# Patient Record
Sex: MALE | Race: BLACK OR AFRICAN AMERICAN | NOT HISPANIC OR LATINO | Employment: PART TIME | URBAN - METROPOLITAN AREA
[De-identification: names, ages, dates, MRNs, and addresses within clinical notes are randomized per-mention and may not be internally consistent; named-entity substitution may affect disease eponyms.]

---

## 2017-01-27 ENCOUNTER — OFFICE VISIT - HEALTHEAST (OUTPATIENT)
Dept: FAMILY MEDICINE | Facility: CLINIC | Age: 52
End: 2017-01-27

## 2017-01-27 DIAGNOSIS — E11.9 TYPE 2 DIABETES MELLITUS (H): ICD-10-CM

## 2017-01-27 DIAGNOSIS — G44.209 MUSCLE TENSION HEADACHE: ICD-10-CM

## 2017-01-27 DIAGNOSIS — Z00.00 HEALTHCARE MAINTENANCE: ICD-10-CM

## 2017-01-27 LAB
ALT SERPL W P-5'-P-CCNC: 16 U/L (ref 0–45)
CHOLEST SERPL-MCNC: 156 MG/DL
FASTING STATUS PATIENT QL REPORTED: ABNORMAL
HBA1C MFR BLD: 7 % (ref 3.5–6)
HDLC SERPL-MCNC: 30 MG/DL

## 2017-01-27 ASSESSMENT — MIFFLIN-ST. JEOR: SCORE: 1473.35

## 2017-01-30 ENCOUNTER — AMBULATORY - HEALTHEAST (OUTPATIENT)
Dept: FAMILY MEDICINE | Facility: CLINIC | Age: 52
End: 2017-01-30

## 2017-01-30 ENCOUNTER — COMMUNICATION - HEALTHEAST (OUTPATIENT)
Dept: FAMILY MEDICINE | Facility: CLINIC | Age: 52
End: 2017-01-30

## 2017-03-02 ENCOUNTER — AMBULATORY - HEALTHEAST (OUTPATIENT)
Dept: FAMILY MEDICINE | Facility: CLINIC | Age: 52
End: 2017-03-02

## 2017-03-02 ENCOUNTER — AMBULATORY - HEALTHEAST (OUTPATIENT)
Dept: LAB | Facility: CLINIC | Age: 52
End: 2017-03-02

## 2017-03-02 DIAGNOSIS — E78.5 HYPERLIPIDEMIA: ICD-10-CM

## 2017-03-02 LAB
ALT SERPL W P-5'-P-CCNC: 32 U/L (ref 0–45)
LDLC SERPL CALC-MCNC: 42 MG/DL

## 2017-03-03 ENCOUNTER — COMMUNICATION - HEALTHEAST (OUTPATIENT)
Dept: FAMILY MEDICINE | Facility: CLINIC | Age: 52
End: 2017-03-03

## 2017-04-04 ENCOUNTER — AMBULATORY - HEALTHEAST (OUTPATIENT)
Dept: CARDIOLOGY | Facility: CLINIC | Age: 52
End: 2017-04-04

## 2017-04-04 DIAGNOSIS — I49.3 PVC (PREMATURE VENTRICULAR CONTRACTION): ICD-10-CM

## 2017-04-06 ENCOUNTER — COMMUNICATION - HEALTHEAST (OUTPATIENT)
Dept: FAMILY MEDICINE | Facility: CLINIC | Age: 52
End: 2017-04-06

## 2017-04-27 ENCOUNTER — RECORDS - HEALTHEAST (OUTPATIENT)
Dept: ADMINISTRATIVE | Facility: OTHER | Age: 52
End: 2017-04-27

## 2017-05-01 ENCOUNTER — RECORDS - HEALTHEAST (OUTPATIENT)
Dept: ADMINISTRATIVE | Facility: OTHER | Age: 52
End: 2017-05-01

## 2017-06-22 ENCOUNTER — AMBULATORY - HEALTHEAST (OUTPATIENT)
Dept: CARDIOLOGY | Facility: CLINIC | Age: 52
End: 2017-06-22

## 2017-06-22 DIAGNOSIS — I49.3 PVC'S (PREMATURE VENTRICULAR CONTRACTIONS): ICD-10-CM

## 2017-08-03 ENCOUNTER — OFFICE VISIT - HEALTHEAST (OUTPATIENT)
Dept: FAMILY MEDICINE | Facility: CLINIC | Age: 52
End: 2017-08-03

## 2017-08-03 DIAGNOSIS — E11.9 TYPE 2 DIABETES MELLITUS (H): ICD-10-CM

## 2017-08-03 DIAGNOSIS — S05.90XA: ICD-10-CM

## 2017-08-03 DIAGNOSIS — R07.89 ATYPICAL CHEST PAIN: ICD-10-CM

## 2017-08-03 LAB — HBA1C MFR BLD: 6.9 % (ref 3.5–6)

## 2017-08-03 ASSESSMENT — MIFFLIN-ST. JEOR: SCORE: 1477.89

## 2017-08-04 ENCOUNTER — COMMUNICATION - HEALTHEAST (OUTPATIENT)
Dept: FAMILY MEDICINE | Facility: CLINIC | Age: 52
End: 2017-08-04

## 2017-08-04 DIAGNOSIS — E11.9 DIABETES MELLITUS (H): ICD-10-CM

## 2017-09-04 ENCOUNTER — COMMUNICATION - HEALTHEAST (OUTPATIENT)
Dept: FAMILY MEDICINE | Facility: CLINIC | Age: 52
End: 2017-09-04

## 2017-09-05 ENCOUNTER — HOSPITAL ENCOUNTER (OUTPATIENT)
Dept: CARDIOLOGY | Facility: CLINIC | Age: 52
Discharge: HOME | End: 2017-09-05
Attending: INTERNAL MEDICINE

## 2017-09-05 DIAGNOSIS — I49.3 PVC'S (PREMATURE VENTRICULAR CONTRACTIONS): ICD-10-CM

## 2017-09-05 LAB
AORTIC ROOT: 2.1 CM
AORTIC VALVE MEAN VELOCITY: 110 CM/S
ASCENDING AORTA: 3.3 CM
AV DIMENSIONLESS INDEX VTI: 0.7
AV MEAN GRADIENT: 5 MMHG
AV PEAK GRADIENT: 10 MMHG
AV VALVE AREA: 2 CM2
AV VELOCITY RATIO: 0.7
BSA FOR ECHO PROCEDURE: 1.86 M2
CV BLOOD PRESSURE: NORMAL MMHG
CV ECHO HEIGHT: 66 IN
CV ECHO WEIGHT: 165 LBS
DOP CALC AO PEAK VEL: 158 CM/S
DOP CALC AO VTI: 36.7 CM
DOP CALC LVOT AREA: 3.14 CM2
DOP CALC LVOT DIAMETER: 2 CM
DOP CALC LVOT PEAK VEL: 114 CM/S
DOP CALC LVOT STROKE VOLUME: 75 CM3
DOP CALCLVOT PEAK VEL VTI: 23.9 CM
EJECTION FRACTION: 61 % (ref 55–75)
FRACTIONAL SHORTENING: 35.1 % (ref 28–44)
INTERVENTRICULAR SEPTUM IN END DIASTOLE: 0.9 CM (ref 0.6–1)
IVS/PW RATIO: 0.9
LA AREA 1: 13.1 CM2
LA AREA 2: 15.2 CM2
LEFT ATRIUM LENGTH: 4.44 CM
LEFT ATRIUM SIZE: 3.7 CM
LEFT ATRIUM VOLUME INDEX: 20.5 ML/M2
LEFT ATRIUM VOLUME: 38.1 CM3
LEFT VENTRICLE DIASTOLIC VOLUME INDEX: 56.5 CM3/M2 (ref 34–74)
LEFT VENTRICLE DIASTOLIC VOLUME: 105 CM3 (ref 62–150)
LEFT VENTRICLE MASS INDEX: 56.2 G/M2
LEFT VENTRICLE SYSTOLIC VOLUME INDEX: 22 CM3/M2 (ref 11–31)
LEFT VENTRICLE SYSTOLIC VOLUME: 41 CM3 (ref 21–61)
LEFT VENTRICULAR INTERNAL DIMENSION IN DIASTOLE: 3.7 CM (ref 4.2–5.8)
LEFT VENTRICULAR INTERNAL DIMENSION IN SYSTOLE: 2.4 CM (ref 2.5–4)
LEFT VENTRICULAR MASS: 104.6 G
LEFT VENTRICULAR OUTFLOW TRACT MEAN GRADIENT: 3 MMHG
LEFT VENTRICULAR OUTFLOW TRACT MEAN VELOCITY: 77.5 CM/S
LEFT VENTRICULAR OUTFLOW TRACT PEAK GRADIENT: 5 MMHG
LEFT VENTRICULAR POSTERIOR WALL IN END DIASTOLE: 1 CM (ref 0.6–1)
LV STROKE VOLUME INDEX: 40.3 ML/M2
MITRAL VALVE E/A RATIO: 1.1
MV AVERAGE E/E' RATIO: 9.7 CM/S
MV DECELERATION TIME: 313 MS
MV E'TISSUE VEL-LAT: 9.75 CM/S
MV E'TISSUE VEL-MED: 6.92 CM/S
MV LATERAL E/E' RATIO: 8.3
MV MEDIAL E/E' RATIO: 11.7
MV PEAK A VELOCITY: 75.5 CM/S
MV PEAK E VELOCITY: 80.9 CM/S
NUC REST DIASTOLIC VOLUME INDEX: 2640 LBS
NUC REST SYSTOLIC VOLUME INDEX: 66 IN
PR MAX PG: 2 MMHG
PR PEAK VELOCITY: 71.2 CM/S
TRICUSPID VALVE ANULAR PLANE SYSTOLIC EXCURSION: 2.4 CM

## 2017-09-05 ASSESSMENT — MIFFLIN-ST. JEOR: SCORE: 1531.19

## 2017-09-13 ENCOUNTER — AMBULATORY - HEALTHEAST (OUTPATIENT)
Dept: LAB | Facility: CLINIC | Age: 52
End: 2017-09-13

## 2017-09-13 DIAGNOSIS — E11.9 TYPE 2 DIABETES MELLITUS (H): ICD-10-CM

## 2017-09-13 LAB
ALT SERPL W P-5'-P-CCNC: 34 U/L (ref 0–45)
CHOLEST SERPL-MCNC: 145 MG/DL
FASTING STATUS PATIENT QL REPORTED: YES
HDLC SERPL-MCNC: 29 MG/DL
LDLC SERPL CALC-MCNC: 87 MG/DL
TRIGL SERPL-MCNC: 144 MG/DL

## 2017-09-14 ENCOUNTER — COMMUNICATION - HEALTHEAST (OUTPATIENT)
Dept: FAMILY MEDICINE | Facility: CLINIC | Age: 52
End: 2017-09-14

## 2017-09-25 ENCOUNTER — COMMUNICATION - HEALTHEAST (OUTPATIENT)
Dept: FAMILY MEDICINE | Facility: CLINIC | Age: 52
End: 2017-09-25

## 2017-10-06 ENCOUNTER — OFFICE VISIT - HEALTHEAST (OUTPATIENT)
Dept: FAMILY MEDICINE | Facility: CLINIC | Age: 52
End: 2017-10-06

## 2017-10-06 DIAGNOSIS — Z01.818 PREOP EXAMINATION: ICD-10-CM

## 2017-10-06 DIAGNOSIS — I49.3 FREQUENT PVCS: ICD-10-CM

## 2017-10-06 ASSESSMENT — MIFFLIN-ST. JEOR: SCORE: 1499.43

## 2017-10-09 LAB
ATRIAL RATE - MUSE: 63 BPM
DIASTOLIC BLOOD PRESSURE - MUSE: NORMAL MMHG
INTERPRETATION ECG - MUSE: NORMAL
P AXIS - MUSE: 67 DEGREES
PR INTERVAL - MUSE: 224 MS
QRS DURATION - MUSE: 100 MS
QT - MUSE: 388 MS
QTC - MUSE: 397 MS
R AXIS - MUSE: 38 DEGREES
SYSTOLIC BLOOD PRESSURE - MUSE: NORMAL MMHG
T AXIS - MUSE: 27 DEGREES
VENTRICULAR RATE- MUSE: 63 BPM

## 2017-10-12 ENCOUNTER — OFFICE VISIT - HEALTHEAST (OUTPATIENT)
Dept: CARDIOLOGY | Facility: CLINIC | Age: 52
End: 2017-10-12

## 2017-10-12 DIAGNOSIS — I49.3 PVC (PREMATURE VENTRICULAR CONTRACTION): ICD-10-CM

## 2017-10-12 ASSESSMENT — MIFFLIN-ST. JEOR: SCORE: 1481.29

## 2017-12-09 ENCOUNTER — COMMUNICATION - HEALTHEAST (OUTPATIENT)
Dept: FAMILY MEDICINE | Facility: CLINIC | Age: 52
End: 2017-12-09

## 2017-12-09 DIAGNOSIS — E11.9 DIABETES MELLITUS (H): ICD-10-CM

## 2018-01-06 ENCOUNTER — COMMUNICATION - HEALTHEAST (OUTPATIENT)
Dept: FAMILY MEDICINE | Facility: CLINIC | Age: 53
End: 2018-01-06

## 2018-01-06 DIAGNOSIS — E11.9 DIABETES MELLITUS (H): ICD-10-CM

## 2018-02-09 ENCOUNTER — COMMUNICATION - HEALTHEAST (OUTPATIENT)
Dept: FAMILY MEDICINE | Facility: CLINIC | Age: 53
End: 2018-02-09

## 2018-04-15 ENCOUNTER — COMMUNICATION - HEALTHEAST (OUTPATIENT)
Dept: FAMILY MEDICINE | Facility: CLINIC | Age: 53
End: 2018-04-15

## 2018-04-15 DIAGNOSIS — E11.9 DIABETES MELLITUS (H): ICD-10-CM

## 2018-04-19 ENCOUNTER — COMMUNICATION - HEALTHEAST (OUTPATIENT)
Dept: FAMILY MEDICINE | Facility: CLINIC | Age: 53
End: 2018-04-19

## 2018-04-19 DIAGNOSIS — E11.9 DIABETES MELLITUS (H): ICD-10-CM

## 2018-06-20 ENCOUNTER — COMMUNICATION - HEALTHEAST (OUTPATIENT)
Dept: FAMILY MEDICINE | Facility: CLINIC | Age: 53
End: 2018-06-20

## 2018-06-20 DIAGNOSIS — Z00.00 HEALTH CARE MAINTENANCE: ICD-10-CM

## 2018-08-09 ENCOUNTER — OFFICE VISIT - HEALTHEAST (OUTPATIENT)
Dept: FAMILY MEDICINE | Facility: CLINIC | Age: 53
End: 2018-08-09

## 2018-08-09 DIAGNOSIS — E11.9 DIABETES MELLITUS (H): ICD-10-CM
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
## 2018-08-09 DIAGNOSIS — E11.9 TYPE 2 DIABETES MELLITUS (H): ICD-10-CM

## 2018-08-09 DIAGNOSIS — R82.90 CLOUDY URINE: ICD-10-CM

## 2018-08-09 DIAGNOSIS — I34.0 MITRAL VALVE REGURGITATION: ICD-10-CM

## 2018-08-09 DIAGNOSIS — M77.12 LATERAL EPICONDYLITIS OF LEFT ELBOW: ICD-10-CM

## 2018-08-09 DIAGNOSIS — M54.50 BILATERAL LOW BACK PAIN WITHOUT SCIATICA: ICD-10-CM

## 2018-08-09 DIAGNOSIS — D69.6 THROMBOCYTOPENIA (H): ICD-10-CM

## 2018-08-09 LAB
ALBUMIN UR-MCNC: NEGATIVE MG/DL
ALT SERPL W P-5'-P-CCNC: 50 U/L (ref 0–45)
ANION GAP SERPL CALCULATED.3IONS-SCNC: 13 MMOL/L (ref 5–18)
APPEARANCE UR: CLEAR
BILIRUB UR QL STRIP: NEGATIVE
BUN SERPL-MCNC: 13 MG/DL (ref 8–22)
CALCIUM SERPL-MCNC: 9.8 MG/DL (ref 8.5–10.5)
CHLORIDE BLD-SCNC: 104 MMOL/L (ref 98–107)
CO2 SERPL-SCNC: 23 MMOL/L (ref 22–31)
COLOR UR AUTO: YELLOW
CREAT SERPL-MCNC: 1.13 MG/DL (ref 0.7–1.3)
CREAT UR-MCNC: 285 MG/DL
ERYTHROCYTE [DISTWIDTH] IN BLOOD BY AUTOMATED COUNT: 12.3 % (ref 11–14.5)
GFR SERPL CREATININE-BSD FRML MDRD: >60 ML/MIN/1.73M2
GLUCOSE BLD-MCNC: 94 MG/DL (ref 70–125)
GLUCOSE UR STRIP-MCNC: NEGATIVE MG/DL
HBA1C MFR BLD: 8.3 % (ref 3.5–6)
HCT VFR BLD AUTO: 42.2 % (ref 40–54)
HGB BLD-MCNC: 14.3 G/DL (ref 14–18)
HGB UR QL STRIP: NEGATIVE
KETONES UR STRIP-MCNC: ABNORMAL MG/DL
LDLC SERPL CALC-MCNC: 109 MG/DL
LEUKOCYTE ESTERASE UR QL STRIP: NEGATIVE
MCH RBC QN AUTO: 27.8 PG (ref 27–34)
MCHC RBC AUTO-ENTMCNC: 33.9 G/DL (ref 32–36)
MCV RBC AUTO: 82 FL (ref 80–100)
MICROALBUMIN UR-MCNC: 0.65 MG/DL (ref 0–1.99)
MICROALBUMIN/CREAT UR: 2.3 MG/G
NITRATE UR QL: NEGATIVE
PH UR STRIP: 6 [PH] (ref 5–8)
PLATELET # BLD AUTO: 112 THOU/UL (ref 140–440)
PMV BLD AUTO: 9.5 FL (ref 7–10)
POTASSIUM BLD-SCNC: 4.3 MMOL/L (ref 3.5–5)
RBC # BLD AUTO: 5.15 MILL/UL (ref 4.4–6.2)
SODIUM SERPL-SCNC: 140 MMOL/L (ref 136–145)
SP GR UR STRIP: 1.02 (ref 1–1.03)
UROBILINOGEN UR STRIP-ACNC: ABNORMAL
WBC: 4.1 THOU/UL (ref 4–11)

## 2018-08-10 ENCOUNTER — AMBULATORY - HEALTHEAST (OUTPATIENT)
Dept: FAMILY MEDICINE | Facility: CLINIC | Age: 53
End: 2018-08-10

## 2018-08-10 ENCOUNTER — COMMUNICATION - HEALTHEAST (OUTPATIENT)
Dept: FAMILY MEDICINE | Facility: CLINIC | Age: 53
End: 2018-08-10

## 2018-08-10 DIAGNOSIS — R74.01 ELEVATED TRANSAMINASE LEVEL: ICD-10-CM

## 2018-08-10 DIAGNOSIS — D69.6 THROMBOCYTOPENIA (H): ICD-10-CM

## 2018-08-10 LAB
ALBUMIN SERPL-MCNC: 4.5 G/DL (ref 3.5–5)
ALP SERPL-CCNC: 69 U/L (ref 45–120)
ALT SERPL W P-5'-P-CCNC: 50 U/L (ref 0–45)
AST SERPL W P-5'-P-CCNC: 48 U/L (ref 0–40)
BILIRUB DIRECT SERPL-MCNC: 0.2 MG/DL
BILIRUB SERPL-MCNC: 0.7 MG/DL (ref 0–1)
HAV IGM SERPL QL IA: NEGATIVE
HBV CORE IGM SERPL QL IA: NEGATIVE
HBV SURFACE AG SERPL QL IA: NEGATIVE
HCV AB SERPL QL IA: NEGATIVE
PROT SERPL-MCNC: 7.8 G/DL (ref 6–8)

## 2018-08-14 ENCOUNTER — COMMUNICATION - HEALTHEAST (OUTPATIENT)
Dept: FAMILY MEDICINE | Facility: CLINIC | Age: 53
End: 2018-08-14

## 2018-08-21 ENCOUNTER — COMMUNICATION - HEALTHEAST (OUTPATIENT)
Dept: FAMILY MEDICINE | Facility: CLINIC | Age: 53
End: 2018-08-21

## 2018-08-21 ENCOUNTER — HOSPITAL ENCOUNTER (OUTPATIENT)
Dept: ULTRASOUND IMAGING | Facility: CLINIC | Age: 53
Discharge: HOME | End: 2018-08-21
Attending: FAMILY MEDICINE

## 2018-08-21 DIAGNOSIS — R74.01 ELEVATED TRANSAMINASE LEVEL: ICD-10-CM

## 2018-08-21 DIAGNOSIS — R74.02 ELEVATION OF LEVEL OF TRANSAMINASE AND LACTIC ACID DEHYDROGENASE (LDH): ICD-10-CM

## 2018-08-21 DIAGNOSIS — D69.6 THROMBOCYTOPENIA (H): ICD-10-CM

## 2018-08-21 DIAGNOSIS — R74.01 ELEVATION OF LEVEL OF TRANSAMINASE AND LACTIC ACID DEHYDROGENASE (LDH): ICD-10-CM

## 2018-10-09 ENCOUNTER — OFFICE VISIT - HEALTHEAST (OUTPATIENT)
Dept: FAMILY MEDICINE | Facility: CLINIC | Age: 53
End: 2018-10-09

## 2018-10-09 ENCOUNTER — RECORDS - HEALTHEAST (OUTPATIENT)
Dept: FAMILY MEDICINE | Facility: CLINIC | Age: 53
End: 2018-10-09

## 2018-10-09 ENCOUNTER — RECORDS - HEALTHEAST (OUTPATIENT)
Dept: ADMINISTRATIVE | Facility: OTHER | Age: 53
End: 2018-10-09

## 2018-10-09 DIAGNOSIS — Z01.818 PREOPERATIVE EXAMINATION: ICD-10-CM

## 2018-10-09 DIAGNOSIS — E11.9 TYPE 2 DIABETES MELLITUS (H): ICD-10-CM

## 2018-10-09 LAB
ERYTHROCYTE [DISTWIDTH] IN BLOOD BY AUTOMATED COUNT: 11.5 % (ref 11–14.5)
HBA1C MFR BLD: 6.8 % (ref 3.5–6)
HCT VFR BLD AUTO: 42.5 % (ref 40–54)
HGB BLD-MCNC: 13.9 G/DL (ref 14–18)
MCH RBC QN AUTO: 27.4 PG (ref 27–34)
MCHC RBC AUTO-ENTMCNC: 32.7 G/DL (ref 32–36)
MCV RBC AUTO: 84 FL (ref 80–100)
PLATELET # BLD AUTO: 126 THOU/UL (ref 140–440)
PMV BLD AUTO: 8.3 FL (ref 7–10)
POTASSIUM BLD-SCNC: 5 MMOL/L (ref 3.5–5)
RBC # BLD AUTO: 5.07 MILL/UL (ref 4.4–6.2)
WBC: 4.7 THOU/UL (ref 4–11)

## 2018-10-10 LAB
ATRIAL RATE - MUSE: 52 BPM
DIASTOLIC BLOOD PRESSURE - MUSE: NORMAL MMHG
INTERPRETATION ECG - MUSE: NORMAL
P AXIS - MUSE: 67 DEGREES
PR INTERVAL - MUSE: 242 MS
QRS DURATION - MUSE: 100 MS
QT - MUSE: 402 MS
QTC - MUSE: 373 MS
R AXIS - MUSE: 44 DEGREES
SYSTOLIC BLOOD PRESSURE - MUSE: NORMAL MMHG
T AXIS - MUSE: 35 DEGREES
VENTRICULAR RATE- MUSE: 52 BPM

## 2018-12-04 ENCOUNTER — OFFICE VISIT - HEALTHEAST (OUTPATIENT)
Dept: CARDIOLOGY | Facility: CLINIC | Age: 53
End: 2018-12-04

## 2018-12-04 DIAGNOSIS — R07.89 OTHER CHEST PAIN: ICD-10-CM

## 2018-12-04 ASSESSMENT — MIFFLIN-ST. JEOR: SCORE: 1490.36

## 2018-12-19 ENCOUNTER — HOSPITAL ENCOUNTER (OUTPATIENT)
Dept: CARDIOLOGY | Facility: CLINIC | Age: 53
Discharge: HOME | End: 2018-12-19
Attending: INTERNAL MEDICINE

## 2018-12-19 ENCOUNTER — HOSPITAL ENCOUNTER (OUTPATIENT)
Dept: NUCLEAR MEDICINE | Facility: CLINIC | Age: 53
Discharge: HOME | End: 2018-12-19
Attending: INTERNAL MEDICINE

## 2018-12-19 DIAGNOSIS — R07.89 OTHER CHEST PAIN: ICD-10-CM

## 2018-12-19 LAB
CV STRESS CURRENT BP HE: NORMAL
CV STRESS CURRENT HR HE: 59
CV STRESS CURRENT HR HE: 59
CV STRESS CURRENT HR HE: 62
CV STRESS CURRENT HR HE: 72
CV STRESS CURRENT HR HE: 72
CV STRESS CURRENT HR HE: 75
CV STRESS CURRENT HR HE: 75
CV STRESS CURRENT HR HE: 76
CV STRESS CURRENT HR HE: 77
CV STRESS CURRENT HR HE: 80
CV STRESS CURRENT HR HE: 82
CV STRESS CURRENT HR HE: 89
CV STRESS CURRENT HR HE: 90
CV STRESS CURRENT HR HE: 93
CV STRESS CURRENT HR HE: 96
CV STRESS CURRENT HR HE: 98
CV STRESS DEVIATION TIME HE: NORMAL
CV STRESS ECHO PERCENT HR HE: NORMAL
CV STRESS EXERCISE STAGE HE: NORMAL
CV STRESS FINAL RESTING BP HE: NORMAL
CV STRESS FINAL RESTING HR HE: 76
CV STRESS MAX HR HE: 100
CV STRESS MAX TREADMILL GRADE HE: 0
CV STRESS MAX TREADMILL SPEED HE: 0
CV STRESS PEAK DIA BP HE: NORMAL
CV STRESS PEAK SYS BP HE: NORMAL
CV STRESS PHASE HE: NORMAL
CV STRESS PROTOCOL HE: NORMAL
CV STRESS RESTING PT POSITION HE: NORMAL
CV STRESS ST DEVIATION AMOUNT HE: NORMAL
CV STRESS ST DEVIATION ELEVATION HE: NORMAL
CV STRESS ST EVELATION AMOUNT HE: NORMAL
CV STRESS TEST TYPE HE: NORMAL
CV STRESS TOTAL STAGE TIME MIN 1 HE: NORMAL
NUC STRESS EJECTION FRACTION: 68 %
STRESS ECHO BASELINE BP: NORMAL
STRESS ECHO BASELINE HR: 54
STRESS ECHO CALCULATED PERCENT HR: 60 %
STRESS ECHO LAST STRESS BP: NORMAL
STRESS ECHO LAST STRESS HR: 89

## 2019-01-22 ENCOUNTER — COMMUNICATION - HEALTHEAST (OUTPATIENT)
Dept: FAMILY MEDICINE | Facility: CLINIC | Age: 54
End: 2019-01-22

## 2019-04-26 ENCOUNTER — COMMUNICATION - HEALTHEAST (OUTPATIENT)
Dept: FAMILY MEDICINE | Facility: CLINIC | Age: 54
End: 2019-04-26

## 2019-04-26 DIAGNOSIS — E11.10 TYPE 2 DIABETES MELLITUS WITH KETOACIDOSIS WITHOUT COMA, UNSPECIFIED WHETHER LONG TERM INSULIN USE (H): ICD-10-CM

## 2019-05-01 ENCOUNTER — OFFICE VISIT - HEALTHEAST (OUTPATIENT)
Dept: FAMILY MEDICINE | Facility: CLINIC | Age: 54
End: 2019-05-01

## 2019-05-01 DIAGNOSIS — E11.9 TYPE 2 DIABETES MELLITUS WITHOUT COMPLICATION, WITHOUT LONG-TERM CURRENT USE OF INSULIN (H): ICD-10-CM

## 2019-05-01 DIAGNOSIS — E11.10 TYPE 2 DIABETES MELLITUS WITH KETOACIDOSIS WITHOUT COMA, UNSPECIFIED WHETHER LONG TERM INSULIN USE (H): ICD-10-CM

## 2019-05-01 DIAGNOSIS — Z01.818 PREOP EXAMINATION: ICD-10-CM

## 2019-05-01 LAB
ATRIAL RATE - MUSE: 49 BPM
DIASTOLIC BLOOD PRESSURE - MUSE: NORMAL MMHG
HBA1C MFR BLD: 7.2 % (ref 3.5–6)
HGB BLD-MCNC: 14.3 G/DL (ref 14–18)
INTERPRETATION ECG - MUSE: NORMAL
P AXIS - MUSE: 55 DEGREES
POTASSIUM BLD-SCNC: 4.3 MMOL/L (ref 3.5–5)
PR INTERVAL - MUSE: 198 MS
QRS DURATION - MUSE: 86 MS
QT - MUSE: 408 MS
QTC - MUSE: 368 MS
R AXIS - MUSE: 37 DEGREES
SYSTOLIC BLOOD PRESSURE - MUSE: NORMAL MMHG
T AXIS - MUSE: 30 DEGREES
VENTRICULAR RATE- MUSE: 49 BPM

## 2019-05-01 ASSESSMENT — MIFFLIN-ST. JEOR: SCORE: 1500.57

## 2019-05-28 ENCOUNTER — COMMUNICATION - HEALTHEAST (OUTPATIENT)
Dept: FAMILY MEDICINE | Facility: CLINIC | Age: 54
End: 2019-05-28

## 2019-05-28 DIAGNOSIS — G44.209 MUSCLE TENSION HEADACHE: ICD-10-CM

## 2019-07-10 ENCOUNTER — RECORDS - HEALTHEAST (OUTPATIENT)
Dept: ADMINISTRATIVE | Facility: OTHER | Age: 54
End: 2019-07-10

## 2019-07-10 LAB — RETINOPATHY: NORMAL

## 2019-07-24 ENCOUNTER — OFFICE VISIT - HEALTHEAST (OUTPATIENT)
Dept: FAMILY MEDICINE | Facility: CLINIC | Age: 54
End: 2019-07-24

## 2019-07-24 DIAGNOSIS — Z01.818 PREOP EXAMINATION: ICD-10-CM

## 2019-07-24 DIAGNOSIS — R23.2 HOT FLASH IN MALE: ICD-10-CM

## 2019-07-24 DIAGNOSIS — H40.32X0 GLAUCOMA OF LEFT EYE SECONDARY TO EYE TRAUMA, UNSPECIFIED GLAUCOMA STAGE: ICD-10-CM

## 2019-07-24 LAB
POTASSIUM BLD-SCNC: 4.2 MMOL/L (ref 3.5–5)
TSH SERPL DL<=0.005 MIU/L-ACNC: 0.72 UIU/ML (ref 0.3–5)

## 2019-07-24 ASSESSMENT — MIFFLIN-ST. JEOR: SCORE: 1479.03

## 2019-08-16 ENCOUNTER — COMMUNICATION - HEALTHEAST (OUTPATIENT)
Dept: FAMILY MEDICINE | Facility: CLINIC | Age: 54
End: 2019-08-16

## 2019-08-16 DIAGNOSIS — G89.29 CHRONIC BILATERAL LOW BACK PAIN WITHOUT SCIATICA: ICD-10-CM

## 2019-08-16 DIAGNOSIS — M54.50 CHRONIC BILATERAL LOW BACK PAIN WITHOUT SCIATICA: ICD-10-CM

## 2019-11-15 ENCOUNTER — COMMUNICATION - HEALTHEAST (OUTPATIENT)
Dept: FAMILY MEDICINE | Facility: CLINIC | Age: 54
End: 2019-11-15

## 2019-11-15 DIAGNOSIS — E11.10 TYPE 2 DIABETES MELLITUS WITH KETOACIDOSIS WITHOUT COMA, UNSPECIFIED WHETHER LONG TERM INSULIN USE (H): ICD-10-CM

## 2019-12-10 ENCOUNTER — OFFICE VISIT - HEALTHEAST (OUTPATIENT)
Dept: CARDIOLOGY | Facility: CLINIC | Age: 54
End: 2019-12-10

## 2019-12-10 DIAGNOSIS — I49.3 PVC'S (PREMATURE VENTRICULAR CONTRACTIONS): ICD-10-CM

## 2019-12-18 ENCOUNTER — HOSPITAL ENCOUNTER (OUTPATIENT)
Dept: CARDIOLOGY | Facility: CLINIC | Age: 54
Discharge: HOME | End: 2019-12-18
Attending: INTERNAL MEDICINE

## 2019-12-18 ENCOUNTER — COMMUNICATION - HEALTHEAST (OUTPATIENT)
Dept: FAMILY MEDICINE | Facility: CLINIC | Age: 54
End: 2019-12-18

## 2019-12-18 DIAGNOSIS — E11.10 TYPE 2 DIABETES MELLITUS WITH KETOACIDOSIS WITHOUT COMA, UNSPECIFIED WHETHER LONG TERM INSULIN USE (H): ICD-10-CM

## 2019-12-18 DIAGNOSIS — I49.3 PVC'S (PREMATURE VENTRICULAR CONTRACTIONS): ICD-10-CM

## 2019-12-31 ENCOUNTER — AMBULATORY - HEALTHEAST (OUTPATIENT)
Dept: CARDIOLOGY | Facility: CLINIC | Age: 54
End: 2019-12-31

## 2019-12-31 DIAGNOSIS — I44.1 WENCKEBACH: ICD-10-CM

## 2019-12-31 DIAGNOSIS — I49.3 FREQUENT PVCS: ICD-10-CM

## 2020-01-15 ENCOUNTER — COMMUNICATION - HEALTHEAST (OUTPATIENT)
Dept: FAMILY MEDICINE | Facility: CLINIC | Age: 55
End: 2020-01-15

## 2020-01-31 ENCOUNTER — OFFICE VISIT - HEALTHEAST (OUTPATIENT)
Dept: SLEEP MEDICINE | Facility: CLINIC | Age: 55
End: 2020-01-31

## 2020-01-31 DIAGNOSIS — E11.9 TYPE 2 DIABETES MELLITUS WITHOUT COMPLICATION, WITHOUT LONG-TERM CURRENT USE OF INSULIN (H): ICD-10-CM

## 2020-01-31 DIAGNOSIS — R06.83 SNORING: ICD-10-CM

## 2020-01-31 DIAGNOSIS — I49.3 FREQUENT PVCS: ICD-10-CM

## 2020-01-31 ASSESSMENT — MIFFLIN-ST. JEOR: SCORE: 1515.32

## 2020-02-04 ENCOUNTER — OFFICE VISIT - HEALTHEAST (OUTPATIENT)
Dept: FAMILY MEDICINE | Facility: CLINIC | Age: 55
End: 2020-02-04

## 2020-02-04 DIAGNOSIS — I49.3 FREQUENT PVCS: ICD-10-CM

## 2020-02-04 DIAGNOSIS — Z01.818 PREOP EXAMINATION: ICD-10-CM

## 2020-02-04 DIAGNOSIS — E11.9 TYPE 2 DIABETES MELLITUS WITHOUT COMPLICATION, WITHOUT LONG-TERM CURRENT USE OF INSULIN (H): ICD-10-CM

## 2020-02-04 DIAGNOSIS — G47.00 INSOMNIA, UNSPECIFIED TYPE: ICD-10-CM

## 2020-02-04 DIAGNOSIS — I34.0 MITRAL VALVE INSUFFICIENCY, UNSPECIFIED ETIOLOGY: ICD-10-CM

## 2020-02-04 DIAGNOSIS — Z00.00 HEALTHCARE MAINTENANCE: ICD-10-CM

## 2020-02-04 LAB
ALT SERPL W P-5'-P-CCNC: 24 U/L (ref 0–45)
ANION GAP SERPL CALCULATED.3IONS-SCNC: 11 MMOL/L (ref 5–18)
ATRIAL RATE - MUSE: 61 BPM
BUN SERPL-MCNC: 9 MG/DL (ref 8–22)
CALCIUM SERPL-MCNC: 9.8 MG/DL (ref 8.5–10.5)
CHLORIDE BLD-SCNC: 105 MMOL/L (ref 98–107)
CO2 SERPL-SCNC: 26 MMOL/L (ref 22–31)
CREAT SERPL-MCNC: 1.05 MG/DL (ref 0.7–1.3)
DIASTOLIC BLOOD PRESSURE - MUSE: NORMAL
GFR SERPL CREATININE-BSD FRML MDRD: >60 ML/MIN/1.73M2
GLUCOSE BLD-MCNC: 99 MG/DL (ref 70–125)
HBA1C MFR BLD: 7.2 % (ref 3.5–6)
HGB BLD-MCNC: 13.9 G/DL (ref 14–18)
HIV 1+2 AB+HIV1 P24 AG SERPL QL IA: NEGATIVE
INTERPRETATION ECG - MUSE: NORMAL
LDLC SERPL CALC-MCNC: 122 MG/DL
P AXIS - MUSE: 64 DEGREES
POTASSIUM BLD-SCNC: 3.7 MMOL/L (ref 3.5–5)
PR INTERVAL - MUSE: 214 MS
QRS DURATION - MUSE: 94 MS
QT - MUSE: 396 MS
QTC - MUSE: 398 MS
R AXIS - MUSE: 36 DEGREES
SODIUM SERPL-SCNC: 142 MMOL/L (ref 136–145)
SYSTOLIC BLOOD PRESSURE - MUSE: NORMAL
T AXIS - MUSE: 28 DEGREES
VENTRICULAR RATE- MUSE: 61 BPM

## 2020-02-04 ASSESSMENT — MIFFLIN-ST. JEOR: SCORE: 1493.71

## 2020-02-05 ENCOUNTER — COMMUNICATION - HEALTHEAST (OUTPATIENT)
Dept: FAMILY MEDICINE | Facility: CLINIC | Age: 55
End: 2020-02-05

## 2020-03-05 ENCOUNTER — COMMUNICATION - HEALTHEAST (OUTPATIENT)
Dept: SCHEDULING | Facility: CLINIC | Age: 55
End: 2020-03-05

## 2020-03-23 ENCOUNTER — COMMUNICATION - HEALTHEAST (OUTPATIENT)
Dept: FAMILY MEDICINE | Facility: CLINIC | Age: 55
End: 2020-03-23

## 2020-03-23 DIAGNOSIS — E11.10 TYPE 2 DIABETES MELLITUS WITH KETOACIDOSIS WITHOUT COMA, UNSPECIFIED WHETHER LONG TERM INSULIN USE (H): ICD-10-CM

## 2020-04-06 ENCOUNTER — AMBULATORY - HEALTHEAST (OUTPATIENT)
Dept: FAMILY MEDICINE | Facility: CLINIC | Age: 55
End: 2020-04-06

## 2020-04-07 ENCOUNTER — RECORDS - HEALTHEAST (OUTPATIENT)
Dept: HEALTH INFORMATION MANAGEMENT | Facility: CLINIC | Age: 55
End: 2020-04-07

## 2020-04-27 ENCOUNTER — COMMUNICATION - HEALTHEAST (OUTPATIENT)
Dept: FAMILY MEDICINE | Facility: CLINIC | Age: 55
End: 2020-04-27

## 2020-05-12 ENCOUNTER — OFFICE VISIT - HEALTHEAST (OUTPATIENT)
Dept: FAMILY MEDICINE | Facility: CLINIC | Age: 55
End: 2020-05-12

## 2020-05-12 DIAGNOSIS — E11.10 TYPE 2 DIABETES MELLITUS WITH KETOACIDOSIS WITHOUT COMA, UNSPECIFIED WHETHER LONG TERM INSULIN USE (H): ICD-10-CM

## 2020-05-12 DIAGNOSIS — R51.9 CHRONIC NONINTRACTABLE HEADACHE, UNSPECIFIED HEADACHE TYPE: ICD-10-CM

## 2020-05-12 DIAGNOSIS — R39.15 URINARY URGENCY: ICD-10-CM

## 2020-05-12 DIAGNOSIS — G89.29 CHRONIC NONINTRACTABLE HEADACHE, UNSPECIFIED HEADACHE TYPE: ICD-10-CM

## 2020-11-10 ENCOUNTER — COMMUNICATION - HEALTHEAST (OUTPATIENT)
Dept: FAMILY MEDICINE | Facility: CLINIC | Age: 55
End: 2020-11-10

## 2020-11-10 DIAGNOSIS — G44.209 MUSCLE TENSION HEADACHE: ICD-10-CM

## 2020-11-14 ENCOUNTER — COMMUNICATION - HEALTHEAST (OUTPATIENT)
Dept: FAMILY MEDICINE | Facility: CLINIC | Age: 55
End: 2020-11-14

## 2020-11-14 ENCOUNTER — COMMUNICATION - HEALTHEAST (OUTPATIENT)
Dept: SCHEDULING | Facility: CLINIC | Age: 55
End: 2020-11-14

## 2020-11-20 ENCOUNTER — OFFICE VISIT - HEALTHEAST (OUTPATIENT)
Dept: FAMILY MEDICINE | Facility: CLINIC | Age: 55
End: 2020-11-20

## 2020-11-20 DIAGNOSIS — E11.10 TYPE 2 DIABETES MELLITUS WITH KETOACIDOSIS WITHOUT COMA, UNSPECIFIED WHETHER LONG TERM INSULIN USE (H): ICD-10-CM

## 2020-11-20 DIAGNOSIS — D64.9 ANEMIA, UNSPECIFIED TYPE: ICD-10-CM

## 2020-11-20 LAB
ALT SERPL W P-5'-P-CCNC: 29 U/L (ref 0–45)
ANION GAP SERPL CALCULATED.3IONS-SCNC: 10 MMOL/L (ref 5–18)
BUN SERPL-MCNC: 13 MG/DL (ref 8–22)
CALCIUM SERPL-MCNC: 9.8 MG/DL (ref 8.5–10.5)
CHLORIDE BLD-SCNC: 103 MMOL/L (ref 98–107)
CO2 SERPL-SCNC: 28 MMOL/L (ref 22–31)
CREAT SERPL-MCNC: 1.25 MG/DL (ref 0.7–1.3)
CREAT UR-MCNC: 93.2 MG/DL
GFR SERPL CREATININE-BSD FRML MDRD: 60 ML/MIN/1.73M2
GLUCOSE BLD-MCNC: 147 MG/DL (ref 70–125)
HBA1C MFR BLD: 8.3 %
HGB BLD-MCNC: 13.2 G/DL (ref 14–18)
LDLC SERPL CALC-MCNC: 87 MG/DL
MICROALBUMIN UR-MCNC: <0.5 MG/DL (ref 0–1.99)
MICROALBUMIN/CREAT UR: NORMAL MG/G{CREAT}
POTASSIUM BLD-SCNC: 4 MMOL/L (ref 3.5–5)
SODIUM SERPL-SCNC: 141 MMOL/L (ref 136–145)

## 2020-11-20 ASSESSMENT — MIFFLIN-ST. JEOR: SCORE: 1530

## 2020-11-24 ENCOUNTER — COMMUNICATION - HEALTHEAST (OUTPATIENT)
Dept: FAMILY MEDICINE | Facility: CLINIC | Age: 55
End: 2020-11-24

## 2021-01-28 ENCOUNTER — OFFICE VISIT - HEALTHEAST (OUTPATIENT)
Dept: FAMILY MEDICINE | Facility: CLINIC | Age: 56
End: 2021-01-28

## 2021-01-28 ENCOUNTER — COMMUNICATION - HEALTHEAST (OUTPATIENT)
Dept: FAMILY MEDICINE | Facility: CLINIC | Age: 56
End: 2021-01-28

## 2021-01-28 DIAGNOSIS — R03.0 ELEVATED BLOOD PRESSURE READING WITHOUT DIAGNOSIS OF HYPERTENSION: ICD-10-CM

## 2021-01-28 DIAGNOSIS — E11.9 TYPE 2 DIABETES MELLITUS WITHOUT COMPLICATION, WITHOUT LONG-TERM CURRENT USE OF INSULIN (H): ICD-10-CM

## 2021-01-28 DIAGNOSIS — R06.83 SNORING: ICD-10-CM

## 2021-01-28 LAB — HBA1C MFR BLD: 7.4 %

## 2021-01-28 ASSESSMENT — MIFFLIN-ST. JEOR: SCORE: 1498.25

## 2021-02-01 ENCOUNTER — TELEPHONE (OUTPATIENT)
Dept: SLEEP MEDICINE | Facility: CLINIC | Age: 56
End: 2021-02-01

## 2021-02-01 NOTE — TELEPHONE ENCOUNTER
Reason for call:  Other   Patient called regarding (reason for call): appointment  Additional comments: Per patient: I'm a current [facility] patient and was seen for my initial evaluation before covid-19(1/31/20) and want to move forward with my sleep study. Would I need to do another eval? If no, can someone call me to schedule the study.    PT MRN for HE is # [id number]        Phone number to reach patient:  Cell number on file:    Telephone Information:   Mobile [phone]       Best Time:  anytime    Can we leave a detailed message on this number?  YES    Travel screening: Not Applicable

## 2021-02-03 DIAGNOSIS — R06.83 SNORING: Primary | ICD-10-CM

## 2021-02-03 NOTE — TELEPHONE ENCOUNTER
Left message for patient to contact sleep center to schedule psg and follow up with Dr. Mariscal. Updated orders are in patients chart.    Iris Fan MA

## 2021-02-04 NOTE — TELEPHONE ENCOUNTER
Returning patients call to schedule psg and follow up, left message for patient to contact sleep center to schedule.    Iris Fan MA

## 2021-02-19 ENCOUNTER — DOCUMENTATION ONLY (OUTPATIENT)
Dept: SLEEP MEDICINE | Facility: CLINIC | Age: 56
End: 2021-02-19

## 2021-02-19 ENCOUNTER — THERAPY VISIT (OUTPATIENT)
Dept: SLEEP MEDICINE | Facility: CLINIC | Age: 56
End: 2021-02-19
Payer: MEDICAID

## 2021-02-19 DIAGNOSIS — R06.83 SNORING: ICD-10-CM

## 2021-02-19 PROCEDURE — 95810 POLYSOM 6/> YRS 4/> PARAM: CPT | Performed by: PEDIATRICS

## 2021-02-23 NOTE — PROCEDURES
" SLEEP STUDY INTERPRETATION  DIAGNOSTIC POLYSOMNOGRAPHY REPORT      Patient: BIJU MOREALND  YOB: 1965  Study Date: 2/19/2021  MRN: [id number]  Referring Provider: -  Ordering Provider: MADELIN Wiley MD    Indications for Polysomnography: The patient is a 55 year old Male who is 5' 5\" and weighs 165.0 lbs. His BMI is 27.5, [place] sleepiness scale 10/24 and neck circumference is 39 cm cm.  Medical history is significant for diabetes mellitus type II, dyslipidemia, legally blind and frequent premature ventricular contractions diagnosed via Holter monitor in 2019. He was told that there was concern for undiagnosed sleep apnea as a result of the Holter monitor testing. A diagnostic polysomnogram was performed to evaluate for sleep apnea.    Polysomnogram Data: A full night polysomnogram recorded the standard physiologic parameters including EEG, EOG, EMG, ECG, nasal and oral airflow. Respiratory parameters of chest and abdominal movements were recorded with respiratory inductance plethysmography. Oxygen saturation was recorded by pulse oximetry. Hypopnea scoring rule used: 1B 4%.    Sleep Architecture: Normal sleep latency and sleep stage distribution.  The patient experienced a prolonged awakening during the study which reduced the sleep efficiency.  The total recording time of the polysomnogram was 525.7 minutes. The total sleep time was 396.0 minutes. Sleep latency was normal at 7.4 minutes without the use of a sleep aid. REM latency was 81.0 minutes. Arousal index was normal at 17.4 arousals per hour. Sleep efficiency was decreased at 75.3%. Wake after sleep onset was 121.5 minutes. The patient spent 8.8% of total sleep time in Stage N1, 55.8% in Stage N2, 15.9% in Stage N3, and 19.4% in REM. Time in REM supine was 52.5 minutes.    Respiration: This study is reassuring for the absence of sleep apnea and sleep-related hypoxemia.  Moderate and intermittent snoring was observed.     Events ? The " polysomnogram revealed a presence of 0 obstructive, 1 central, and 2 mixed apneas resulting in an apnea index of 0.5 events per hour. There were 5 obstructive hypopneas and 0 central hypopneas resulting in an obstructive hypopnea index of 0.8 and central hypopnea index of 0 events per hour. The combined apnea/hypopnea index was 1.2 events per hour (central apnea/hypopnea index was 0.2 events per hour). The REM AHI was 3.9 events per hour. The supine AHI was 0.9 events per hour. The RERA index was 2.9 events per hour.  The RDI was 4.1 events per hour.    Snoring - was reported as moderate/intermittent.    Respiratory rate and pattern - was notable for normal respiratory rate and pattern.    Sustained Sleep Associated Hypoventilation - Transcutaneous carbon dioxide monitoring was not used, however significant hypoventilation was not suggested by oximetry.    Sleep Associated Hypoxemia - (Greater than 5 minutes O2 sat at or below 88%) was not present. Baseline oxygen saturation was 97.2%. Lowest oxygen saturation was 91.0%. Time spent less than or equal to 88% was 0 minutes. Time spent less than or equal to 89% was 0 minutes.    Movement Activity: No significant limb movements or parasomnias.     Periodic Limb Activity - There were 0 PLMs during the entire study. The PLM index was 0 movements per hour. The PLM Arousal Index was 0 per hour.    REM EMG Activity - Excessive muscle activity was not present.    Nocturnal Behavior - Abnormal sleep related behaviors were not noted during sleep.     Bruxism - None apparent.    Cardiac Summary: Normal sinus rhythm.  The average pulse rate was 62.4 bpm. The minimum pulse rate was 47.5 bpm while the maximum pulse rate was 87.2 bpm.  Arrhythmias were not noted.    Assessment:     Primary snoring.    Recommendations:    Patient may be a candidate for dental appliance through referral to Sleep Dentistry for the treatment of socially disruptive snoring, as applicable.    Advice  regarding the risks of drowsy driving.    Suggest optimizing sleep schedule and avoiding sleep deprivation.    Weight management (if BMI > 30).    Diagnostic Codes:   R06.83 Primary snoring    2/19/2021 [facility] Sleep Study (165.0 lbs) - AHI 1.2, RDI 4.1, Supine AHI 0.9, REM AHI 3.9, Low O2 91.0%, Time Spent ?88% 0 minutes / Time Spent ?89% 0 minutes.  _____________________________________   Electronically Signed By:  Dian Wiley MD   2/23/2021

## 2021-03-11 ENCOUNTER — VIRTUAL VISIT (OUTPATIENT)
Dept: SLEEP MEDICINE | Facility: CLINIC | Age: 56
End: 2021-03-11
Payer: MEDICAID

## 2021-03-11 VITALS — BODY MASS INDEX: 27.16 KG/M2 | WEIGHT: 169 LBS | HEIGHT: 66 IN

## 2021-03-11 DIAGNOSIS — R06.83 SNORING: Primary | ICD-10-CM

## 2021-03-11 DIAGNOSIS — E11.00 TYPE 2 DIABETES MELLITUS WITH HYPEROSMOLARITY WITHOUT COMA, WITHOUT LONG-TERM CURRENT USE OF INSULIN (H): ICD-10-CM

## 2021-03-11 DIAGNOSIS — E66.3 OVERWEIGHT (BMI 25.0-29.9): ICD-10-CM

## 2021-03-11 PROCEDURE — 99214 OFFICE O/P EST MOD 30 MIN: CPT | Mod: GT | Performed by: PEDIATRICS

## 2021-03-11 RX ORDER — METFORMIN HCL 500 MG
1000 TABLET, EXTENDED RELEASE 24 HR ORAL
COMMUNITY
Start: 2020-11-20 | End: 2021-12-01

## 2021-03-11 ASSESSMENT — MIFFLIN-ST. JEOR: SCORE: 1553.3

## 2021-03-11 NOTE — PATIENT INSTRUCTIONS
Your BMI is Body mass index is 27.07 kg/m .  Weight management is a personal decision.  If you are interested in exploring weight loss strategies, the following discussion covers the approaches that may be successful. Body mass index (BMI) is one way to tell whether you are at a healthy weight, overweight, or obese. It measures your weight in relation to your height.  A BMI of 18.5 to 24.9 is in the healthy range. A person with a BMI of 25 to 29.9 is considered overweight, and someone with a BMI of 30 or greater is considered obese. More than two-thirds of American adults are considered overweight or obese.  Being overweight or obese increases the risk for further weight gain. Excess weight may lead to heart disease and diabetes.  Creating and following plans for healthy eating and physical activity may help you improve your health.  Weight control is part of healthy lifestyle and includes exercise, emotional health, and healthy eating habits. Careful eating habits lifelong are the mainstay of weight control. Though there are significant health benefits from weight loss, long-term weight loss with diet alone may be very difficult to achieve- studies show long-term success with dietary management in less than 10% of people. Attaining a healthy weight may be especially difficult to achieve in those with severe obesity. In some cases, medications, devices and surgical management might be considered.  What can you do?  If you are overweight or obese and are interested in methods for weight loss, you should discuss this with your provider.     Consider reducing daily calorie intake by 500 calories.     Keep a food journal.     Avoiding skipping meals, consider cutting portions instead.    Diet combined with exercise helps maintain muscle while optimizing fat loss. Strength training is particularly important for building and maintaining muscle mass. Exercise helps reduce stress, increase energy, and improves fitness.  Increasing exercise without diet control, however, may not burn enough calories to loose weight.       Start walking three days a week 10-20 minutes at a time    Work towards walking thirty minutes five days a week     Eventually, increase the speed of your walking for 1-2 minutes at time    In addition, we recommend that you review healthy lifestyles and methods for weight loss available through the National Institutes of Health patient information sites:  http://win.niddk.nih.gov/publications/index.htm    And look into health and wellness programs that may be available through your health insurance provider, employer, local community center, or [employer]aci club.    Weight management plan: Patient was referred to their PCP to discuss a diet and exercise plan.

## 2021-03-11 NOTE — PROGRESS NOTES
Kurtis is a 55 year old who is being evaluated via a billable video visit.      How would you like to obtain your AVS? Mail a copy  If the video visit is dropped, the invitation should be resent by: Send to e-mail at: [email or web address]  Will anyone else be joining your video visit? No   Eugenie Laureano CMA        Video Start Time: 8:30 AM  Video-Visit Details    Type of service:  Video Visit    Video End Time:8:46 AM    Originating Location (pt. Location): Home    Distant Location (provider location):  [facility]     Platform used for Video Visit: [employer]     [facility] [place]  Outpatient Sleep Medicine Encounter, Established Patient      Name: Kurtis Senior MRN# [id number]   Age: 55 year old YOB: 1965     Date of Visit: March 11, 2021  Primary care provider: Iron Reinoso    Assessment and Plan:     1. Snoring  Patient has primary snoring but no evidence of sleep disordered breathing. His snoring is not socially disruptive at this time.      2. Overweight (BMI 25.0-29.9)  Weight loss may improve snoring.    3. Type 2 diabetes mellitus with hyperosmolarity without coma, without long-term current use of insulin (H)  I invited the patient to return to see me if his snoring or breathing during sleep were to become more symptomatic over time.  Retesting would be indicated at that point.  Untreated sleep apnea can interfere with diabetes management; once sleep apnea is treated, HbA1c may improve.      History:   Kurtis Senior is a 55 year old male whose visit today is to review the results of his overnight sleep study performed on February 19, 2021.Medical history is significant for diabetes mellitus type II, dyslipidemia, legally blind and frequent premature ventricular contractions diagnosed via Holter monitor in 2019. He was told that there was concern for undiagnosed sleep apnea as a result of the Holter monitor testing. A diagnostic  "polysomnogram was performed to evaluate for sleep apnea.    The sleep study was reassuring for the absence of sleep apnea and sleep-related hypoxemia.  Moderate and intermittent snoring was observed.  Aside from a prolonged awakening during the test, sleep stages showed normal distribution.  There were no significant limb movements or parasomnias.  No cardiac abnormalities.    Review of recent laboratory studies shows elevated HbA1c at 7.4%, down from 8.3% 3 months ago.  BMI is in the overweight range at 27 kg/m2.      Patient reports some sleep disruption related to his eyes bothering him, particularly when they are dry.  He has undergone corneal transplants but still has significantly reduced vision bilaterally.  He has artificial tears, steroid drops, and other medications for relief.    Medications:     Current Outpatient Medications   Medication Sig     metFORMIN (GLUCOPHAGE-XR) 500 MG 24 hr tablet Take 1,000 mg by mouth     No current facility-administered medications for this visit.         Allergies   Allergen Reactions     Chloroquine Itching       Past Medical History:   No past medical history on file.     Physical Examination:   Ht 1.683 m (5' 6.25\")   Wt 76.7 kg (169 lb)   BMI 27.07 kg/m         Dian Wiley MD   3/11/2021   [facility] [address], [address]  [place] [address] [phone]    Copy to: Iron Reinoso   "

## 2021-03-18 ENCOUNTER — OFFICE VISIT - HEALTHEAST (OUTPATIENT)
Dept: CARDIOLOGY | Facility: CLINIC | Age: 56
End: 2021-03-18

## 2021-03-18 DIAGNOSIS — I34.0 MITRAL REGURGITATION: ICD-10-CM

## 2021-03-18 ASSESSMENT — MIFFLIN-ST. JEOR: SCORE: 1493.65

## 2021-04-07 LAB — SLPCOMP: NORMAL

## 2021-04-15 ENCOUNTER — HOSPITAL ENCOUNTER (OUTPATIENT)
Dept: CARDIOLOGY | Facility: CLINIC | Age: 56
Discharge: HOME | End: 2021-04-15
Attending: INTERNAL MEDICINE

## 2021-04-15 LAB
AORTIC ROOT: 2.8 CM
AORTIC VALVE MEAN VELOCITY: 85.1 CM/S
ASCENDING AORTA: 3 CM
AV DIMENSIONLESS INDEX VTI: 0.8
AV MEAN GRADIENT: 3 MMHG
AV PEAK GRADIENT: 6.7 MMHG
AV VALVE AREA: 1.9 CM2
AV VELOCITY RATIO: 0.7
BSA FOR ECHO PROCEDURE: 1.82 M2
CV BLOOD PRESSURE: ABNORMAL MMHG
CV ECHO HEIGHT: 65.3 IN
CV ECHO WEIGHT: 159 LBS
DOP CALC AO PEAK VEL: 129 CM/S
DOP CALC AO VTI: 22 CM
DOP CALC LVOT AREA: 2.54 CM2
DOP CALC LVOT DIAMETER: 1.8 CM
DOP CALC LVOT PEAK VEL: 91 CM/S
DOP CALC LVOT STROKE VOLUME: 42.7 CM3
DOP CALCLVOT PEAK VEL VTI: 16.8 CM
EJECTION FRACTION: 60 % (ref 55–75)
FRACTIONAL SHORTENING: 33.3 % (ref 28–44)
INTERVENTRICULAR SEPTUM IN END DIASTOLE: 1 CM (ref 0.6–1)
IVS/PW RATIO: 1.1
LA AREA 1: 15.1 CM2
LA AREA 2: 13.4 CM2
LEFT ATRIUM LENGTH: 4.48 CM
LEFT ATRIUM SIZE: 3.1 CM
LEFT ATRIUM TO AORTIC ROOT RATIO: 1.11 NO UNITS
LEFT ATRIUM VOLUME INDEX: 21.1 ML/M2
LEFT ATRIUM VOLUME: 38.4 ML
LEFT VENTRICLE CARDIAC INDEX: 2.3 L/MIN/M2
LEFT VENTRICLE CARDIAC OUTPUT: 4.2 L/MIN
LEFT VENTRICLE DIASTOLIC VOLUME INDEX: 26.4 CM3/M2 (ref 34–74)
LEFT VENTRICLE DIASTOLIC VOLUME: 48 CM3 (ref 62–150)
LEFT VENTRICLE HEART RATE: 98 BPM
LEFT VENTRICLE MASS INDEX: 48.2 G/M2
LEFT VENTRICLE SYSTOLIC VOLUME INDEX: 10.4 CM3/M2 (ref 11–31)
LEFT VENTRICLE SYSTOLIC VOLUME: 19 CM3 (ref 21–61)
LEFT VENTRICULAR INTERNAL DIMENSION IN DIASTOLE: 3.3 CM (ref 4.2–5.8)
LEFT VENTRICULAR INTERNAL DIMENSION IN SYSTOLE: 2.2 CM (ref 2.5–4)
LEFT VENTRICULAR MASS: 87.7 G
LEFT VENTRICULAR OUTFLOW TRACT MEAN GRADIENT: 2 MMHG
LEFT VENTRICULAR OUTFLOW TRACT MEAN VELOCITY: 61.2 CM/S
LEFT VENTRICULAR OUTFLOW TRACT PEAK GRADIENT: 3 MMHG
LEFT VENTRICULAR POSTERIOR WALL IN END DIASTOLE: 0.9 CM (ref 0.6–1)
LV STROKE VOLUME INDEX: 23.5 ML/M2
MITRAL VALVE E/A RATIO: 0.7
MV AVERAGE E/E' RATIO: 9.2 CM/S
MV DECELERATION TIME: 238 MS
MV E'TISSUE VEL-LAT: 8.29 CM/S
MV E'TISSUE VEL-MED: 5.85 CM/S
MV LATERAL E/E' RATIO: 7.8
MV MEDIAL E/E' RATIO: 11.1
MV PEAK A VELOCITY: 93.2 CM/S
MV PEAK E VELOCITY: 64.7 CM/S
NUC REST DIASTOLIC VOLUME INDEX: 2544 LBS
NUC REST SYSTOLIC VOLUME INDEX: 65.25 IN
PV ACCELERATION TIME: 131 MS
TRICUSPID VALVE ANULAR PLANE SYSTOLIC EXCURSION: 1.7 CM

## 2021-04-15 ASSESSMENT — MIFFLIN-ST. JEOR: SCORE: 1492.06

## 2021-04-22 ENCOUNTER — OFFICE VISIT - HEALTHEAST (OUTPATIENT)
Dept: FAMILY MEDICINE | Facility: CLINIC | Age: 56
End: 2021-04-22

## 2021-04-22 DIAGNOSIS — E11.9 TYPE 2 DIABETES MELLITUS WITHOUT COMPLICATION, WITHOUT LONG-TERM CURRENT USE OF INSULIN (H): ICD-10-CM

## 2021-04-22 DIAGNOSIS — N52.9 ERECTILE DYSFUNCTION, UNSPECIFIED ERECTILE DYSFUNCTION TYPE: ICD-10-CM

## 2021-04-22 LAB
ALBUMIN UR-MCNC: NEGATIVE G/DL
ANION GAP SERPL CALCULATED.3IONS-SCNC: 12 MMOL/L (ref 5–18)
APPEARANCE UR: CLEAR
BILIRUB UR QL STRIP: NEGATIVE
BUN SERPL-MCNC: 10 MG/DL (ref 8–22)
CALCIUM SERPL-MCNC: 9.4 MG/DL (ref 8.5–10.5)
CHLORIDE BLD-SCNC: 104 MMOL/L (ref 98–107)
CO2 SERPL-SCNC: 24 MMOL/L (ref 22–31)
COLOR UR AUTO: YELLOW
CREAT SERPL-MCNC: 1.04 MG/DL (ref 0.7–1.3)
CREAT UR-MCNC: 139.8 MG/DL
GFR SERPL CREATININE-BSD FRML MDRD: >60 ML/MIN/1.73M2
GLUCOSE BLD-MCNC: 136 MG/DL (ref 70–125)
GLUCOSE UR STRIP-MCNC: NEGATIVE MG/DL
HBA1C MFR BLD: 7.4 %
HGB UR QL STRIP: NEGATIVE
KETONES UR STRIP-MCNC: ABNORMAL MG/DL
LEUKOCYTE ESTERASE UR QL STRIP: NEGATIVE
MICROALBUMIN UR-MCNC: 0.58 MG/DL (ref 0–1.99)
MICROALBUMIN/CREAT UR: 4.1 MG/G
NITRATE UR QL: NEGATIVE
PH UR STRIP: 5 [PH] (ref 5–8)
POTASSIUM BLD-SCNC: 4.5 MMOL/L (ref 3.5–5)
SODIUM SERPL-SCNC: 140 MMOL/L (ref 136–145)
SP GR UR STRIP: 1.02 (ref 1–1.03)
UROBILINOGEN UR STRIP-ACNC: ABNORMAL

## 2021-04-22 ASSESSMENT — MIFFLIN-ST. JEOR: SCORE: 1498.25

## 2021-04-23 ENCOUNTER — COMMUNICATION - HEALTHEAST (OUTPATIENT)
Dept: FAMILY MEDICINE | Facility: CLINIC | Age: 56
End: 2021-04-23

## 2021-05-24 ENCOUNTER — COMMUNICATION - HEALTHEAST (OUTPATIENT)
Dept: FAMILY MEDICINE | Facility: CLINIC | Age: 56
End: 2021-05-24

## 2021-05-24 DIAGNOSIS — M54.50 CHRONIC BILATERAL LOW BACK PAIN WITHOUT SCIATICA: ICD-10-CM

## 2021-05-24 DIAGNOSIS — G89.29 CHRONIC BILATERAL LOW BACK PAIN WITHOUT SCIATICA: ICD-10-CM

## 2021-05-28 NOTE — TELEPHONE ENCOUNTER
RN cannot approve Refill Request    RN can NOT refill this medication PCP messaged that patient is overdue for Labs and Office Visit. Last office visit: 2018 Iron Reinoso MD Last Physical: 10/9/2018 Last MTM visit: Visit date not found Last visit same specialty: 2018 Iron Reinoso MD.  Next visit within 3 mo: Visit date not found  Next physical within 3 mo: Visit date not found      Iris Martinez, [facility] Triage/Med Refill 2019    Requested Prescriptions   Pending Prescriptions Disp Refills     metFORMIN (GLUCOPHAGE XR) 500 MG 24 hr tablet 120 tablet 6     Si pills twice per day       Metformin Refill Protocol Failed - 2019  4:03 PM        Failed - Visit with PCP or prescribing provider visit in last 6 months or next 3 months     Last office visit with prescriber/PCP: Visit date not found OR same dept: 2018 Iron Reinoso MD OR same specialty: 2018 Iron Reinoso MD Last physical: Visit date not found Last MTM visit: Visit date not found         Next appt within 3 mo: Visit date not found  Next physical within 3 mo: Visit date not found  Prescriber OR PCP: Iron Reinoso MD  Last diagnosis associated with med order: There are no diagnoses linked to this encounter.   If protocol passes may refill for 12 months if within 3 months of last provider visit (or a total of 15 months).           Failed - A1C in last 6 months     Hemoglobin A1c   Date Value Ref Range Status   10/09/2018 6.8 (H) 3.5 - 6.0 % Final               Passed - Blood pressure in last 12 months     BP Readings from Last 1 Encounters:   18 120/70             Passed - LFT or AST or ALT in last 12 months     Albumin   Date Value Ref Range Status   2018 4.5 3.5 - 5.0 g/dL Final     Bilirubin, Total   Date Value Ref Range Status   2018 0.7 0.0 - 1.0 mg/dL Final     Bilirubin, Direct   Date Value Ref Range Status   2018 0.2 <=0.5 mg/dL Final     Alkaline  Phosphatase   Date Value Ref Range Status   08/09/2018 69 45 - 120 U/L Final     AST   Date Value Ref Range Status   08/09/2018 48 (H) 0 - 40 U/L Final     ALT   Date Value Ref Range Status   08/09/2018 50 (H) 0 - 45 U/L Final   08/09/2018 50 (H) 0 - 45 U/L Final     Protein, Total   Date Value Ref Range Status   08/09/2018 7.8 6.0 - 8.0 g/dL Final                Passed - GFR or Serum Creatinine in last 6 months     GFR MDRD Non Af Amer   Date Value Ref Range Status   08/09/2018 >60 >60 mL/min/1.73m2 Final     GFR MDRD Af Amer   Date Value Ref Range Status   08/09/2018 >60 >60 mL/min/1.73m2 Final             Passed - Microalbumin in last year      Microalbumin, Random Urine   Date Value Ref Range Status   08/09/2018 0.65 0.00 - 1.99 mg/dL Final
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
## 2021-05-29 NOTE — TELEPHONE ENCOUNTER
RN cannot approve Refill Request    RN can NOT refill this medication med is not covered by policy/route to provider     . Last office visit: 8/9/2018 Iron Reinoso MD Last Physical: 5/1/2019 Last MTM visit: Visit date not found Last visit same specialty: 8/9/2018 Iron Reinoso MD.  Next visit within 3 mo: Visit date not found  Next physical within 3 mo: Visit date not found      Bonita Stewart, [facility] Triage/Med Refill 5/28/2019    Requested Prescriptions   Pending Prescriptions Disp Refills     ibuprofen (ADVIL,MOTRIN) 600 MG tablet 40 tablet 1     Sig: Take 1 tablet (600 mg total) by mouth every 6 (six) hours as needed for pain.       There is no refill protocol information for this order

## 2021-05-30 VITALS — BODY MASS INDEX: 24.75 KG/M2 | HEIGHT: 66 IN | WEIGHT: 154 LBS
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
## 2021-05-30 NOTE — PATIENT INSTRUCTIONS - HE
.    This is an excerpt from the American Family Physician Medical Journal where a doctor, who thinks we should be doing the psa writes to argue with someone who wrote an editorial saying we should not (2017):     One would have to screen approximately 800 men (with PSA test)to prevent one from dying of prostate cancer.  I do not believe that this supports the authors' conclusion that  prostate cancer screening provides a very small benefit, which is outweighed by significant potential harms of screening and associated follow-up treatment.   This conclusion will no doubt impact the decision making of many practicing physicians, but there is a danger of impersonalization inherent in reaching conclusions based solely on statistical data.  For another perspective, imagine sitting in a crowded football stadium with 100,000 men in the stands who have all been screened for prostate cancer. At halftime, all men whose lives were saved in the past 12 months because of prostate cancer screening are invited to come onto the field. According to the statistics derived from The Lancet study, there are now 125 men (100,000/800) enjoying a football game who would have been dead without screening. How many of the other men in the crowd would conclude that screening is only of little benefit and be inclined to forgo future screening for themselves? I hope family physicians reject practice-changing conclusions based only on  valid data  that ignore the value of individual lives lost because of nifty statistical analysis.  KIMBERLY MCCANN MD  .  Reply : Dr. Mccann focuses entirely on the potential benefits of prostate cancer screening at the population level and disregards the harms. This is inappropriate, because when we are beginning with a population of asymptomatic men, it is particularly important that the process of screening, biopsy, and treatment does not do more harm than good. Unfortunately, the potential harms of screening for  prostate cancer are well established. Based on data those 125 men would be joined on the field by more than 10,000 men who undergo treatment for prostate cancer, of whom about one-third never would have experienced symptoms or illness from the disease. Of those men, approximately 1,700 will experience urinary incontinence, 2,800 will experience long-term erectile dysfunction, and 40 to 50 will die from complications of prostate cancer treatment. These harms may be partially mitigated if more men opt for active surveillance or watchful waiting, although it is unclear to what extent.  Finally, we are not aware of any organization that recommends population screening for prostate cancer in all men in a certain age range. The American Urological Association2 and the American College of Physicians3 recommend that physicians discuss the potential benefits and harms of screening in men 55 to 69 years of age, but they do not recommend screening for all men in that age range. The [employer] Task Force1 and the U.S. Preventive Services Task Force4 recommend against screening for prostate cancer.    -The Author of the Original Article

## 2021-05-31 VITALS — HEIGHT: 66 IN | BODY MASS INDEX: 26.52 KG/M2 | WEIGHT: 165 LBS

## 2021-05-31 VITALS — WEIGHT: 155 LBS | BODY MASS INDEX: 24.91 KG/M2 | HEIGHT: 66 IN

## 2021-05-31 VITALS — WEIGHT: 158 LBS | HEIGHT: 66 IN | BODY MASS INDEX: 25.39 KG/M2

## 2021-05-31 VITALS — BODY MASS INDEX: 24.75 KG/M2 | WEIGHT: 154 LBS | HEIGHT: 66 IN

## 2021-05-31 NOTE — TELEPHONE ENCOUNTER
RN cannot approve Refill Request    RN can NOT refill this medication med is not covered by policy/route to provider     . Last office visit: 8/9/2018 Iron Reinoso MD Last Physical: 7/24/2019 Last MTM visit: Visit date not found Last visit same specialty: 8/9/2018 Iron Reinoso MD.  Next visit within 3 mo: Visit date not found  Next physical within 3 mo: Visit date not found      Bonita Stewart, [facility] Triage/Med Refill 8/16/2019    Requested Prescriptions   Pending Prescriptions Disp Refills     acetaminophen (TYLENOL) 325 MG tablet 50 tablet 3     Sig: Take 2 tablets (650 mg total) by mouth every 4 (four) hours as needed for pain.       There is no refill protocol information for this order

## 2021-06-01 VITALS — WEIGHT: 156.5 LBS | BODY MASS INDEX: 25.26 KG/M2

## 2021-06-01 VITALS — BODY MASS INDEX: 25.58 KG/M2 | WEIGHT: 158.5 LBS

## 2021-06-02 VITALS — WEIGHT: 156 LBS | BODY MASS INDEX: 25.07 KG/M2 | HEIGHT: 66 IN

## 2021-06-02 VITALS — HEIGHT: 66 IN | BODY MASS INDEX: 25.71 KG/M2 | WEIGHT: 160 LBS

## 2021-06-03 VITALS
RESPIRATION RATE: 14 BRPM | SYSTOLIC BLOOD PRESSURE: 116 MMHG | OXYGEN SATURATION: 97 % | HEART RATE: 75 BPM | DIASTOLIC BLOOD PRESSURE: 74 MMHG

## 2021-06-03 VITALS — BODY MASS INDEX: 26.16 KG/M2 | WEIGHT: 157 LBS | HEIGHT: 65 IN

## 2021-06-03 NOTE — TELEPHONE ENCOUNTER
RN cannot approve Refill Request    RN can NOT refill this medication Protocol failed and NO refill given. Last office visit: 8/9/2018 Iron Reinoso MD Last Physical: 7/24/2019 Last MTM visit: Visit date not found Last visit same specialty: 8/9/2018 Iron Reinoso MD.  Next visit within 3 mo: Visit date not found  Next physical within 3 mo: Visit date not found      Lluvia Leos, [facility] Triage/Med Refill 11/16/2019    Requested Prescriptions   Pending Prescriptions Disp Refills     metFORMIN (GLUCOPHAGE-XR) 500 MG 24 hr tablet [Pharmacy Med Name: metFORMIN HCl ER Oral Tablet Extended Release 24 Hour 500 MG] 120 tablet 5     Sig: TAKE TWO TABLETS BY MOUTH TWICE DAILY       Metformin Refill Protocol Failed - 11/15/2019 12:38 PM        Failed - LFT or AST or ALT in last 12 months     Albumin   Date Value Ref Range Status   08/09/2018 4.5 3.5 - 5.0 g/dL Final     Bilirubin, Total   Date Value Ref Range Status   08/09/2018 0.7 0.0 - 1.0 mg/dL Final     Bilirubin, Direct   Date Value Ref Range Status   08/09/2018 0.2 <=0.5 mg/dL Final     Alkaline Phosphatase   Date Value Ref Range Status   08/09/2018 69 45 - 120 U/L Final     AST   Date Value Ref Range Status   08/09/2018 48 (H) 0 - 40 U/L Final     ALT   Date Value Ref Range Status   08/09/2018 50 (H) 0 - 45 U/L Final   08/09/2018 50 (H) 0 - 45 U/L Final     Protein, Total   Date Value Ref Range Status   08/09/2018 7.8 6.0 - 8.0 g/dL Final                Failed - GFR or Serum Creatinine in last 6 months     GFR MDRD Non Af Amer   Date Value Ref Range Status   08/09/2018 >60 >60 mL/min/1.73m2 Final     GFR MDRD Af Amer   Date Value Ref Range Status   08/09/2018 >60 >60 mL/min/1.73m2 Final             Failed - A1C in last 6 months     Hemoglobin A1c   Date Value Ref Range Status   05/01/2019 7.2 (H) 3.5 - 6.0 % Final               Failed - Microalbumin in last year      Microalbumin, Random Urine   Date Value Ref Range Status   08/09/2018 0.65  0.00 - 1.99 mg/dL Final                  Passed - Blood pressure in last 12 months     BP Readings from Last 1 Encounters:   07/24/19 116/74             Passed - Visit with PCP or prescribing provider visit in last 6 months or next 3 months     Last office visit with prescriber/PCP: Visit date not found OR same dept: Visit date not found OR same specialty: 8/9/2018 Iron Reinoso MD Last physical: 7/24/2019 Last MTM visit: Visit date not found         Next appt within 3 mo: Visit date not found  Next physical within 3 mo: Visit date not found  Prescriber OR PCP: Iron Reinoso MD  Last diagnosis associated with med order: 1. Type 2 diabetes mellitus with ketoacidosis without coma, unspecified whether long term insulin use (H)  - metFORMIN (GLUCOPHAGE-XR) 500 MG 24 hr tablet [Pharmacy Med Name: metFORMIN HCl ER Oral Tablet Extended Release 24 Hour 500 MG]; TAKE TWO TABLETS BY MOUTH TWICE DAILY   Dispense: 120 tablet; Refill: 5     If protocol passes may refill for 12 months if within 3 months of last provider visit (or a total of 15 months).

## 2021-06-04 VITALS
HEIGHT: 65 IN | DIASTOLIC BLOOD PRESSURE: 78 MMHG | HEART RATE: 71 BPM | BODY MASS INDEX: 27.49 KG/M2 | WEIGHT: 165 LBS | SYSTOLIC BLOOD PRESSURE: 122 MMHG | OXYGEN SATURATION: 97 %

## 2021-06-04 VITALS
TEMPERATURE: 98.3 F | HEIGHT: 65 IN | HEART RATE: 76 BPM | OXYGEN SATURATION: 98 % | WEIGHT: 159 LBS | RESPIRATION RATE: 16 BRPM | BODY MASS INDEX: 26.49 KG/M2 | SYSTOLIC BLOOD PRESSURE: 132 MMHG | DIASTOLIC BLOOD PRESSURE: 86 MMHG

## 2021-06-04 NOTE — TELEPHONE ENCOUNTER
Refill provided for 2 months.  Please help patient schedule follow-up diabetes appointment w patient

## 2021-06-04 NOTE — TELEPHONE ENCOUNTER
RN cannot approve Refill Request    RN can NOT refill this medication PCP messaged that patient is overdue for Labs. Last office visit: 8/9/2018 Iron Reinoso MD Last Physical: 7/24/2019 Last MTM visit: Visit date not found Last visit same specialty: 8/9/2018 Iron Reinoso MD.  Next visit within 3 mo: Visit date not found  Next physical within 3 mo: Visit date not found      David Gomes, [facility] Triage/Med Refill 12/20/2019    Requested Prescriptions   Pending Prescriptions Disp Refills     metFORMIN (GLUCOPHAGE-XR) 500 MG 24 hr tablet [Pharmacy Med Name: metFORMIN HCl ER Oral Tablet Extended Release 24 Hour 500 MG] 120 tablet 5     Sig: TAKE TWO TABLETS BY MOUTH TWICE DAILY       Metformin Refill Protocol Failed - 12/18/2019 11:09 AM        Failed - LFT or AST or ALT in last 12 months     Albumin   Date Value Ref Range Status   08/09/2018 4.5 3.5 - 5.0 g/dL Final     Bilirubin, Total   Date Value Ref Range Status   08/09/2018 0.7 0.0 - 1.0 mg/dL Final     Bilirubin, Direct   Date Value Ref Range Status   08/09/2018 0.2 <=0.5 mg/dL Final     Alkaline Phosphatase   Date Value Ref Range Status   08/09/2018 69 45 - 120 U/L Final     AST   Date Value Ref Range Status   08/09/2018 48 (H) 0 - 40 U/L Final     ALT   Date Value Ref Range Status   08/09/2018 50 (H) 0 - 45 U/L Final   08/09/2018 50 (H) 0 - 45 U/L Final     Protein, Total   Date Value Ref Range Status   08/09/2018 7.8 6.0 - 8.0 g/dL Final                Failed - GFR or Serum Creatinine in last 6 months     GFR MDRD Non Af Amer   Date Value Ref Range Status   08/09/2018 >60 >60 mL/min/1.73m2 Final     GFR MDRD Af Amer   Date Value Ref Range Status   08/09/2018 >60 >60 mL/min/1.73m2 Final             Failed - A1C in last 6 months     Hemoglobin A1c   Date Value Ref Range Status   05/01/2019 7.2 (H) 3.5 - 6.0 % Final               Failed - Microalbumin in last year      Microalbumin, Random Urine   Date Value Ref Range Status    08/09/2018 0.65 0.00 - 1.99 mg/dL Final                  Passed - Blood pressure in last 12 months     BP Readings from Last 1 Encounters:   12/10/19 116/74             Passed - Visit with PCP or prescribing provider visit in last 6 months or next 3 months     Last office visit with prescriber/PCP: Visit date not found OR same dept: Visit date not found OR same specialty: 8/9/2018 Iron Reinoso MD Last physical: 7/24/2019 Last MTM visit: Visit date not found         Next appt within 3 mo: Visit date not found  Next physical within 3 mo: Visit date not found  Prescriber OR PCP: Iron Reinoso MD  Last diagnosis associated with med order: 1. Type 2 diabetes mellitus with ketoacidosis without coma, unspecified whether long term insulin use (H)  - metFORMIN (GLUCOPHAGE-XR) 500 MG 24 hr tablet [Pharmacy Med Name: metFORMIN HCl ER Oral Tablet Extended Release 24 Hour 500 MG]; TAKE TWO TABLETS BY MOUTH TWICE DAILY  Dispense: 120 tablet; Refill: 5     If protocol passes may refill for 12 months if within 3 months of last provider visit (or a total of 15 months).

## 2021-06-05 VITALS
RESPIRATION RATE: 19 BRPM | WEIGHT: 160 LBS | DIASTOLIC BLOOD PRESSURE: 88 MMHG | HEIGHT: 65 IN | BODY MASS INDEX: 26.66 KG/M2 | TEMPERATURE: 96.9 F | SYSTOLIC BLOOD PRESSURE: 155 MMHG | HEART RATE: 69 BPM

## 2021-06-05 VITALS
TEMPERATURE: 96.8 F | DIASTOLIC BLOOD PRESSURE: 72 MMHG | WEIGHT: 167 LBS | SYSTOLIC BLOOD PRESSURE: 128 MMHG | BODY MASS INDEX: 27.82 KG/M2 | RESPIRATION RATE: 17 BRPM | HEIGHT: 65 IN | HEART RATE: 65 BPM

## 2021-06-05 VITALS
WEIGHT: 159 LBS | DIASTOLIC BLOOD PRESSURE: 76 MMHG | BODY MASS INDEX: 26.49 KG/M2 | HEIGHT: 65 IN | RESPIRATION RATE: 14 BRPM | HEART RATE: 68 BPM | SYSTOLIC BLOOD PRESSURE: 104 MMHG

## 2021-06-05 VITALS
DIASTOLIC BLOOD PRESSURE: 76 MMHG | OXYGEN SATURATION: 100 % | BODY MASS INDEX: 26.66 KG/M2 | HEIGHT: 65 IN | HEART RATE: 71 BPM | SYSTOLIC BLOOD PRESSURE: 127 MMHG | WEIGHT: 160 LBS | TEMPERATURE: 96.8 F

## 2021-06-05 VITALS — HEIGHT: 65 IN | WEIGHT: 159 LBS | BODY MASS INDEX: 26.49 KG/M2
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
## 2021-06-06 NOTE — TELEPHONE ENCOUNTER
Pt called in states he want to speak with PCP about his Blood sugar medication.     Please advise      Santiago Randall RN, [facility] Triage/Med Refill 3/5/2020 8:18 PM

## 2021-06-06 NOTE — TELEPHONE ENCOUNTER
Called and spoke with patient he stated he is out of his metformin. I instructed patient to call the pharmacy, a 3 months supply was sent for him in 12/2019. Patient has question he wants to know if the metformin concentration is enough for his blood, if it is can he stop or can he continue. I clarify 3 times with patient and unsure what he meant by that question. But stated just ask Dr. Reinoso the question.    I also mention Dr. Reinoso would like for him to come back in 5/2020 for a follow up and patient declined. He stated he was told to come back in 6 months. When do you want patient to return?     Please advise.

## 2021-06-06 NOTE — TELEPHONE ENCOUNTER
I have not found time to call hime yet- please contact him and let him know that I will do that.  I would be helpful to me to know what his concern is before we talk- thanks

## 2021-06-07 NOTE — PROGRESS NOTES
Chart reviewed by Ambulatory Quality and Data team    Please abstract the following data from this visit with this patient into the appropriate field in Epic:    Tests that can be patient reported without a hard copy:        Other Tests found in the patient's chart through Chart Review/Care Everywhere:    Eye exam with ophthalmology on this date: 7/10/2019.     Note to Abstraction: If this section is blank, no results were found via Chart Review/Care Everywhere.

## 2021-06-07 NOTE — TELEPHONE ENCOUNTER
Medication refill requested. Please authorize medication if appropriate.   Last seen 2/4/20  Last refill 12/20/19

## 2021-06-07 NOTE — TELEPHONE ENCOUNTER
New Appointment Needed  What is the reason for the visit:    office visit, 3 mo follow up diabetes, A1C  Provider Preference: PCP only  How soon do you need to be seen?: In May  Waitlist offered?: No  Okay to leave a detailed message:  Yes

## 2021-06-08 NOTE — PROGRESS NOTES
Over 25 minutes spent, greater than 50% was counseling regarding following issues:  1. Type 2 diabetes mellitus  Patient with genetic variant type 2 diabetes which is to cause diabetes that does not progress.  Diet-controlled only.  A1c 7.0.  Reassured.  No change.  Follow-up 6 months await other labs.  - Glycosylated Hemoglobin A1c  - Microalbumin, Random Urine  - Basic Metabolic Panel  - ALT (SGPT)  - HDL Cholesterol  - Cholesterol, Total    2. Healthcare maintenance  Extensive discussion regarding healthcare maintenance options.  Discussed pros and cons of PSA, patient for now declined  Accepting of colonoscopy.  - Ambulatory referral for Colonoscopy  - HDL Cholesterol  - Cholesterol, Total  Exam done today which included review vital signs, her iliac, respiratory, extremities with monofilament testing was normal.  Results for orders placed or performed in visit on 01/27/17   Glycosylated Hemoglobin A1c   Result Value Ref Range    Hemoglobin A1c 7.0 (H) 3.5 - 6.0 %

## 2021-06-08 NOTE — PROGRESS NOTES
"Kurtis Senior is a 54 y.o. male who is being evaluated via a billable telephone visit.      The patient has been notified of following:     \"This telephone visit will be conducted via a call between you and your physician/provider. We have found that certain health care needs can be provided without the need for a physical exam.  This service lets us provide the care you need with a short phone conversation.  If a prescription is necessary we can send it directly to your pharmacy.  If lab work is needed we can place an order for that and you can then stop by our lab to have the test done at a later time.    Telephone visits are billed at different rates depending on your insurance coverage. During this emergency period, for some insurers they may be billed the same as an in-person visit.  Please reach out to your insurance provider with any questions.    If during the course of the call the physician/provider feels a telephone visit is not appropriate, you will not be charged for this service.\"    Patient has given verbal consent to a Telephone visit? Yes    What phone number would you like to be contacted at? [phone]    Patient would like to receive their AVS by AVS Preference: Mail a copy.    Discussed problems below + janie rec  Assessment/Plan:    Problem List Items Addressed This Visit        High    Type 2 diabetes mellitus (H)     Met 1000 two times a day  a1c feb- 7.2  No changes         Relevant Medications    metFORMIN (GLUCOPHAGE-XR) 500 MG 24 hr tablet       Unprioritized    Chronic nonintractable headache, unspecified headache type     Posterior  Felt to be occip neuralgia  Injection was not effective before    Discussed neuro referral post pandemic  Perhaps related to eye- corneal transplant             Urinary urgency - Primary     Only when home- better when out and about  No change in intake of bladder irritants  No burning  No obstructive sx    Bladder retraining exercises mailed to " patient- discussed  Hold off on anticholinergic for now  ua next visit               Phone call duration:  21 minutes    Iron Reinoso MD
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
## 2021-06-13 NOTE — TELEPHONE ENCOUNTER
On 11/14/2020 he was told to go to the emergency room.  Did he actually do this?  It does not look like he has    I think this patient needs a virtual visit to evaluate his clinical condition and decide what needs to be done.  Please set this up

## 2021-06-13 NOTE — TELEPHONE ENCOUNTER
"Pt is calling in about being exposed to someone last Monday who tested positive for Covid, and this person got the results Thursday that he was positive. Pt started having chest pain and shortness of breath on Thursday, and is still having this now. Pt reports it is continuous, and he feels like something is blocking his airway. Pt feels chest pressure, sharp pain, rating it \"6\". Pt feels worse when laying down. Pt reports right arm is also painful, and he is unable to carry anything over 5 pounds. Pt also reports blisters on his mouth, and fever, but no cough now.   Care advice given, and per protocol pt should go to the ER, and if he has no one to take him there now, he needs to call 911. Pt verbalized understanding, and will call 911 now.     David Gomes RN [facility] Triage/Medication Refill     Reason for Disposition    Pain also present in shoulder(s) or arm(s) or jaw  (Exception: pain is clearly made worse by movement)    Difficulty breathing    SEVERE chest pain    Additional Information    Negative: Severe difficulty breathing (e.g., struggling for each breath, speaks in single words)    Negative: Difficult to awaken or acting confused (e.g., disoriented, slurred speech)    Negative: Shock suspected (e.g., cold/pale/clammy skin, too weak to stand, low BP, rapid pulse)    Negative: [1] Chest pain lasts > 5 minutes AND [2] history of heart disease  (i.e., heart attack, bypass surgery, angina, angioplasty, CHF; not just a heart murmur)    Negative: [1] Chest pain lasts > 5 minutes AND [2] described as crushing, pressure-like, or heavy    Negative: [1] Chest pain lasts > 5 minutes AND [2] age > 50    Negative: [1] Chest pain lasts > 5 minutes AND [2] age > 30 AND [3] at least one cardiac risk factor (i.e., hypertension, diabetes, obesity, smoker or strong family history of heart disease)    Negative: [1] Chest pain lasts > 5 minutes AND [2] not relieved with nitroglycerin    Negative: Passed out (i.e., " "lost consciousness, collapsed and was not responding)    Negative: Heart beating < 50 beats per minute OR > 140 beats per minute    Negative: Visible sweat on face or sweat dripping down face    Negative: Sounds like a life-threatening emergency to the triager    Negative: Followed a chest injury    Negative: [1] Intermittent  chest pain or \"angina\" AND [2] increasing in severity or frequency  (Exception: pains lasting a few seconds)    Protocols used: CHEST PAIN-A-AH      "

## 2021-06-13 NOTE — TELEPHONE ENCOUNTER
Called and spoke with patient, he stated he has a test schedule with [facility] pharmacy on Wednesday. He cancel his ofv for Thursday. He will be calling us back for an appointment when his test results come back.    He did not go to the ER because he was afraid to catch covid from the ER.
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
## 2021-06-13 NOTE — TELEPHONE ENCOUNTER
Who is calling:  Pt    Reason for Call:  Pt want his dr to order a covid test. He has fever and short of breath.    Date of last appointment with primary care:  Okay to leave a detailed message: Yes

## 2021-06-14 NOTE — TELEPHONE ENCOUNTER
Called pt no answer lmcb to [facility] [phone] we dont have a [facility] in [place] he would need to go to [place]
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
## 2021-06-14 NOTE — TELEPHONE ENCOUNTER
----- Message from Iron Reinoso MD sent at 1/28/2021  1:05 PM CST -----  Please contact [facility], patient saw a Dr. Wiley does not appear to be in our list of physicians currently.    He was referred for a sleep study at [place], by his account  He cannot get transportation to [place] and never did the sleep study    I am wondering if someone can contact him and try to arrange a sleep study that he can get to.  Think that has to come from the sleep medicine clinic.
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
## 2021-06-17 NOTE — TELEPHONE ENCOUNTER
RN cannot approve Refill Request    RN can NOT refill this medication Protocol failed and NO refill given. Last office visit: 4/22/2021 Iron Reinoso MD Last Physical: 2/4/2020 Last MTM visit: Visit date not found Last visit same specialty: 4/22/2021 Iron Reinoso MD.  Next visit within 3 mo: Visit date not found  Next physical within 3 mo: Visit date not found      Lucy Wei, [facility] Triage/Med Refill 5/24/2021    Requested Prescriptions   Pending Prescriptions Disp Refills     PAIN RELIEVER, ACETAMINOPHEN, 325 mg tablet [Pharmacy Med Name: SM Pain Reliever Oral Tablet 325 MG] 50 tablet 0     Sig: Take 2 tablets (650 mg total) by mouth every 4 (four) hours as needed for pain       There is no refill protocol information for this order

## 2021-06-17 NOTE — TELEPHONE ENCOUNTER
Telephone Encounter by Humberto Spicer at 4/30/2021  2:17 PM     Author: Humberto Spicer Service: -- Author Type: Patient Access    Filed: 4/30/2021  2:20 PM Encounter Date: 4/23/2021 Status: Signed    : Humberto Spicer (Patient Access)       PRIOR AUTHORIZATION DENIED    Denial Rational: Revatio is approved for a diagnosis of pulmonary arterial hypertension (PAH), WHO Group 1. Approval is not granted for any patient on Adcirca (tadafil).          Appeal Information: If the provider would like to appeal this denial, please provide a letter of medical necessity and once it has been completed and placed in the patient's chart, notify the Central PA Team ([facility] MED [id number]) and the appeal can be initiated on behalf of the patient and provider.  Please also include any therapies that the patient has tried and their outcomes.

## 2021-06-17 NOTE — TELEPHONE ENCOUNTER
Central PA team  [phone]  Pool: [facility] PA MED ([id number])          PA has been initiated.       PA form completed and faxed insurance via Cover My Meds     Key:  [id number]     Medication:  sildenafil (REVATIO) 20 mg tablet    Insurance:  [place] MEDICAID        Response will be received via fax and may take up to 5-10 business days depending on plan
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
## 2021-06-20 NOTE — LETTER
Letter by Iron Reinoso MD at      Author: Iron Reinoso MD Service: -- Author Type: --    Filed:  Encounter Date: 2/5/2020 Status: (Other)         Kurtis Senior  [address] [facility] [address]             February 5, 2020         Dear Mr. Senior,    Below are the results from your recent visit:    Resulted Orders   HIV Antigen/Antibody Screening Cascade   Result Value Ref Range    HIV Antigen / Antibody Negative Negative    Narrative    Method is Abbott HIV Ag/Ab for the detection of HIV p24 antigen, HIV-1 antibodies and HIV-2 antibodies.   Basic Metabolic Panel   Result Value Ref Range    Sodium 142 136 - 145 mmol/L    Potassium 3.7 3.5 - 5.0 mmol/L    Chloride 105 98 - 107 mmol/L    CO2 26 22 - 31 mmol/L    Anion Gap, Calculation 11 5 - 18 mmol/L    Glucose 99 70 - 125 mg/dL    Calcium 9.8 8.5 - 10.5 mg/dL    BUN 9 8 - 22 mg/dL    Creatinine 1.05 0.70 - 1.30 mg/dL    GFR MDRD Af Amer >60 >60 mL/min/1.73m2    GFR MDRD Non Af Amer >60 >60 mL/min/1.73m2    Narrative    Fasting Glucose reference range is 70-99 mg/dL per  American Diabetes Association (ADA) guidelines.   Glycosylated Hemoglobin A1c   Result Value Ref Range    Hemoglobin A1c 7.2 (H) 3.5 - 6.0 %   Custom LDL Cholesterol, Direct   Result Value Ref Range    Direct  <=129 mg/dl   ALT (SGPT)   Result Value Ref Range    ALT 24 0 - 45 U/L   Hemoglobin   Result Value Ref Range    Hemoglobin 13.9 (L) 14.0 - 18.0 g/dL       Kurtis, the results of your recent blood test are normal.         Please call with questions or contact us using [employer].    Sincerely,        Electronically signed by Iron Reinoso MD

## 2021-06-21 NOTE — LETTER
"Letter by Iron Reinoso MD at      Author: Iron Reinoso MD Service: -- Author Type: --    Filed:  Encounter Date: 11/24/2020 Status: (Other)         Kurtis Senior  [address] [place] [place] [address]             November 24, 2020         Dear Mr. Senior,    Below are the results from your recent visit:    Resulted Orders   Microalbumin, Random Urine   Result Value Ref Range    Microalbumin, Random Urine <0.50 0.00 - 1.99 mg/dL    Creatinine, Urine 93.2 mg/dL    Microalbumin/Creatinine Ratio Random Urine        Comment:      \"Unable to calculate: Creatinine and/or Microalbumin value below detectable level\"    Narrative    Microalbumin, Random Urine  <2.0 mg/dL . . . . . . . . Normal  3.0-30.0 mg/dL . . . . . . Microalbuminuria  >30.0 mg/dL . . . . . .  . Clinical Proteinuria    Microalbumin/Creatinine Ratio, Random Urine  <20 mg/g . . . . .. . . . Normal   mg/g . . . . . . . Microalbuminuria  >300 mg/g . . . . . . . . Clinical Proteinuria       Glycosylated Hemoglobin A1c   Result Value Ref Range    Hemoglobin A1c 8.3 (H) <=5.6 %      Comment:      Normal <5.7% Prediabete 5.7-6.4% Diabletes 6.5% or higher - adopted from ADA consensus guidelines   ALT (SGPT)   Result Value Ref Range    ALT 29 0 - 45 U/L   LDL Cholesterol, Direct   Result Value Ref Range    Direct LDL 87 <=129 mg/dl   Basic Metabolic Panel   Result Value Ref Range    Sodium 141 136 - 145 mmol/L    Potassium 4.0 3.5 - 5.0 mmol/L    Chloride 103 98 - 107 mmol/L    CO2 28 22 - 31 mmol/L    Anion Gap, Calculation 10 5 - 18 mmol/L    Glucose 147 (H) 70 - 125 mg/dL    Calcium 9.8 8.5 - 10.5 mg/dL    BUN 13 8 - 22 mg/dL    Creatinine 1.25 0.70 - 1.30 mg/dL    GFR MDRD Af Amer >60 >60 mL/min/1.73m2    GFR MDRD Non Af Amer 60 (L) >60 mL/min/1.73m2    Narrative    Fasting Glucose reference range is 70-99 mg/dL per  American Diabetes Association (ADA) guidelines.   Hemoglobin   Result Value Ref Range    Hemoglobin 13.2 (L) 14.0 - 18.0 g/dL "       Kurtis, your red blood cell count was a little bit low again.  This is a minor problem and I am not very worried about it.  The next time we see you, we can do some special tests of your blood to check your iron and other causes of anemia.  I do not think you need to come in early for this.    Please call with questions or contact us using [employer].    Sincerely,        Electronically signed by Iron Reinoso MD

## 2021-06-23 NOTE — TELEPHONE ENCOUNTER
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 The pt would like the form to be faxed to [phone] when completed, and please call him when it is done. Thank you.

## 2021-06-23 NOTE — TELEPHONE ENCOUNTER
These contact patient    Unless there is something that is missing, the [facility] paperwork for a child needs to be filled out by the child's doctor, not the parents.  I do not recall if I am his son's Doctor (?)  But I have not had recent contact

## 2021-06-25 NOTE — PROGRESS NOTES
Progress Notes by Jenna Arrieta MD at 10/12/2017 12:50 PM     Author: Jenna Arrieta MD Service: -- Author Type: Physician    Filed: 10/12/2017  1:31 PM Encounter Date: 10/12/2017 Status: Signed    : Jenna Arrieta MD (Physician)           Click to link to [facility] HEART CARE NOTE    Thank you, Dr. Reinoso, for asking us to see Kurtis Senior at the [facility].      Assessment/Recommendations   Assessment:    1.  Premature ventricular contractions: Asymptomatic and preserved left ventricular systolic function.  No need for any further workup at this time.  2.  Chest pain: Sounds atypical and likely musculoskeletal in nature.  He underwent an MRI stress test the year prior that was negative for inducible ischemia.  He denies any exertional chest pain.  3.  May follow-up in a year.       History of Present Illness    Mr. Kurtis Senior is a 51 y.o. male with history of diabetes mellitus type II, dyslipidemia, legally blind and frequent premature ventricular contractions who is here for follow-up.  Last year he underwent an MRI stress test that showed preserved left ventricular systolic function and no evidence for inducible ischemia.    He denies any palpitations.  He did one episode of chest pain that occurred 3 months ago.  He reports that the chest pain was worse with certain movements and was not caused by exertion.  He has not at any further episodes since then.  No breathing difficulty either.  Recent echocardiogram shows no significant valvular disease and preserved left ventricular systolic function.  Echocardiogram 9/5/17    1.Left ventricle ejection fraction is normal. The calculated left ventricular ejection fraction is 61%.    2.TAPSE is normal, which is consistent with normal right ventricular systolic function.    3.Trivial regurgitant lesions of the valves as mentioned below. Nonspecific thickening seen involving the right  coronary cusp of the aortic valve.    4.When compared to the previous study dated 4/19/2016, the degree of mitral regurgitation seen on prior study is less on current study.       Physical Examination Review of Systems   Vitals:    10/12/17 1244   BP: 121/73   Pulse: (!) 52   Resp: 16     Body mass index is 24.86 kg/(m^2).  Wt Readings from Last 3 Encounters:   10/12/17 154 lb (69.9 kg)   10/06/17 158 lb (71.7 kg)   09/05/17 165 lb (74.8 kg)       General Appearance:   alert, no apparent distress   HEENT:  no scleral icterus; the mucous membranes are pink and moist                                  Neck: jugular venous pressure normal   Chest: the spine is straight and the chest is symmetric   Lungs:   respirations unlabored; the lungs are clear to auscultation   Cardiovascular:   regular rhythm with normal first and second heart sounds and no murmurs or gallops   Abdomen:  no organomegaly, masses, bruits, or tenderness; bowel sounds are present   Extremities: no edema   Skin: no xanthelasma    General: Weight Gain  Eyes: Visual Distubance  Ears/Nose/Throat: WNL  Lungs: Cough  Heart: Chest Pain  Stomach: WNL  Bladder: Frequent Urination at Night  Muscle/Joints: WNL  Skin: WNL  Nervous System: WNL  Mental Health: WNL     Blood: WNL     Medical History  Surgical History Family History Social History   Legally blind    Hypertension    Diabetes mellitus    Frequent premature ventricular contractions No past surgical history on file. Family History   Problem Relation Age of Onset   ? Vision loss Father    ? Cancer Paternal Uncle    ? Vision loss Mother     Social History     Social History   ? Marital status:      Spouse name: N/A   ? Number of children: N/A   ? Years of education: N/A     Occupational History   ? Not on file.     Social History Main Topics   ? Smoking status: Never Smoker   ? Smokeless tobacco: Not on file   ? Alcohol use Not on file   ? Drug use: Not on file   ? Sexual activity: Not on file      Other Topics Concern   ? Not on file     Social History Narrative          Medications  Allergies   Current Outpatient Prescriptions   Medication Sig Dispense Refill   ? acetaminophen (TYLENOL) 325 MG tablet Take 2 tablets (650 mg total) by mouth every 4 (four) hours as needed for pain. 50 tablet 3   ? aspirin 81 MG EC tablet Take 1 tablet (81 mg total) by mouth daily. 100 tablet 3   ? AZOPT 1 % ophthalmic suspension Administer 1 drop to both eyes 2 (two) times a day.     ? COMBIGAN 0.2-0.5 % ophthalmic solution Administer 2 drops to both eyes 2 (two) times a day.     ? ibuprofen (ADVIL,MOTRIN) 600 MG tablet Take 1 tablet (600 mg total) by mouth every 6 (six) hours as needed for pain. 40 tablet 1   ? metFORMIN (GLUCOPHAGE) 500 MG tablet TAKE 1 TABLET BY MOUTH TWICE DAILY WITH MEALS 120 tablet 0   ? pravastatin (PRAVACHOL) 20 MG tablet Take 1 tablet (20 mg total) by mouth at bedtime. 30 tablet 6   ? tobramycin (NEBCIN) 40 mg/mL Soln      ? TRAVATAN Z 0.004 % Drop ophthalmic drops Administer 1 drop to both eyes bedtime.       No current facility-administered medications for this visit.       Allergies   Allergen Reactions   ? Chloroquine Itching   ? Other Allergy (See Comments) Other (See Comments)     Contact Dermatitis - Pt states that he is allergic to a Phosphate medication, he got blisters   ? Sulfa (Sulfonamide Antibiotics) Itching         Lab Results    Chemistry/lipid CBC Cardiac Enzymes/BNP/TSH/INR   Lab Results   Component Value Date    CHOL 145 09/13/2017    HDL 29 (L) 09/13/2017    LDLCALC 87 09/13/2017    TRIG 144 09/13/2017    CREATININE 1.07 01/27/2017    BUN 12 01/27/2017    K 4.1 10/06/2017     01/27/2017     01/27/2017    CO2 28 01/27/2017    Lab Results   Component Value Date    WBC 6.4 10/21/2015    HGB 13.6 (L) 10/21/2015    HCT 40.0 10/21/2015    MCV 84 10/21/2015     10/21/2015    Lab Results   Component Value Date    TSH 0.63 03/21/2016

## 2021-06-27 NOTE — PROGRESS NOTES
Progress Notes by Jenna Arrieta MD at 12/4/2018  1:50 PM     Author: Jenna Arrieta MD Service: -- Author Type: Physician    Filed: 12/7/2018  4:47 PM Encounter Date: 12/4/2018 Status: Signed    : Jenna Arrieta MD (Physician)           Click to link to [facility] HEART CARE NOTE    Thank you, Dr. Reinoso, for asking us to see Kurtis Senior at the [facility].      Assessment/Recommendations   Assessment:    1.  Premature ventricular contractions: Currently asymptomatic.  Denies any palpitations and history of preserved left ventricular systolic function on workup.  2.  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  Recommend Lexiscan nuclear stress test for further evaluation given his risk factors.  May follow-up in a year.       History of Present Illness    Mr. Kurtis Senior is a 53 y.o. male with history of diabetes mellitus type II, dyslipidemia, legally blind and frequent premature ventricular contractions who is here for follow-up. In 2016 he underwent an MRI stress test that showed preserved left ventricular systolic function and no evidence for inducible ischemia.     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  Denies any palpitations.  Denies any problems with breathing.     Physical Examination Review of Systems   Vitals:    12/04/18 1401   BP: 120/70   Pulse: 76   Resp: 18     Body mass index is 25.18 kg/m .  Wt Readings from Last 3 Encounters:   12/04/18 156 lb (70.8 kg)   10/09/18 156 lb 8 oz (71 kg)   08/09/18 158 lb 8 oz (71.9 kg)       General Appearance:   alert, no apparent distress   HEENT:  no scleral icterus; the mucous membranes are pink and moist                                  Neck: jugular venous pressure normal   Chest: the spine is straight  and the chest is symmetric   Lungs:   respirations unlabored; the lungs are clear to auscultation   Cardiovascular:   regular rhythm with normal first and second heart sounds and no murmurs or gallops; carotid, radial, femoral, and posterior tibial pulses are intact and there are no carotid bruits.   Abdomen:  no organomegaly, masses, bruits, or tenderness; bowel sounds are present   Extremities: no edema   Skin: no xanthelasma    General: WNL  Eyes: Visual Distubance  Ears/Nose/Throat: WNL  Lungs: WNL  Heart: Chest Pain, Shortness of Breath with activity  Stomach: WNL  Bladder: WNL  Muscle/Joints: WNL  Skin: WNL  Nervous System: WNL  Mental Health: WNL     Blood: WNL     Medical History  Surgical History Family History Social History   Legally blind     Hypertension     Diabetes mellitus     Frequent premature ventricular contractions No past surgical history on file. Family History   Problem Relation Age of Onset   ? Vision loss Father    ? Cancer Paternal Uncle    ? Vision loss Mother     Social History     Socioeconomic History   ? Marital status:      Spouse name: Not on file   ? Number of children: Not on file   ? Years of education: Not on file   ? Highest education level: Not on file   Social Needs   ? Financial resource strain: Not on file   ? Food insecurity - worry: Not on file   ? Food insecurity - inability: Not on file   ? Transportation needs - medical: Not on file   ? Transportation needs - non-medical: Not on file   Occupational History   ? Not on file   Tobacco Use   ? Smoking status: Never Smoker   ? Smokeless tobacco: Never Used   Substance and Sexual Activity   ? Alcohol use: Not on file   ? Drug use: Not on file   ? Sexual activity: Not on file   Other Topics Concern   ? Not on file   Social History Narrative   ? Not on file          Medications  Allergies   Current Outpatient Medications   Medication Sig Dispense Refill   ? acetaminophen (TYLENOL) 325 MG tablet Take 2 tablets (650 mg  total) by mouth every 4 (four) hours as needed for pain. 50 tablet 3   ? AZOPT 1 % ophthalmic suspension Administer 1 drop to both eyes 2 (two) times a day.     ? COMBIGAN 0.2-0.5 % ophthalmic solution Administer 2 drops to both eyes 2 (two) times a day.     ? ibuprofen (ADVIL,MOTRIN) 600 MG tablet Take 1 tablet (600 mg total) by mouth every 6 (six) hours as needed for pain. 40 tablet 1   ? metFORMIN (GLUCOPHAGE XR) 500 MG 24 hr tablet 2 pills twice per day 120 tablet 6   ? TRAVATAN Z 0.004 % Drop ophthalmic drops Administer 1 drop to both eyes bedtime.     ? aspirin 81 MG EC tablet Take 1 tablet (81 mg total) by mouth daily. 90 tablet 0   ? pravastatin (PRAVACHOL) 20 MG tablet Take 1 tablet (20 mg total) by mouth at bedtime. 30 tablet 6     No current facility-administered medications for this visit.       Allergies   Allergen Reactions   ? Chloroquine Itching   ? Other Allergy (See Comments) Other (See Comments)     Contact Dermatitis - Pt states that he is allergic to a Phosphate medication, he got blisters   ? Sulfa (Sulfonamide Antibiotics) Itching         Lab Results    Chemistry/lipid CBC Cardiac Enzymes/BNP/TSH/INR   Lab Results   Component Value Date    CHOL 145 09/13/2017    HDL 29 (L) 09/13/2017    LDLCALC 87 09/13/2017    TRIG 144 09/13/2017    CREATININE 1.13 08/09/2018    BUN 13 08/09/2018    K 5.0 10/09/2018     08/09/2018     08/09/2018    CO2 23 08/09/2018    Lab Results   Component Value Date    WBC 4.7 10/09/2018    HGB 13.9 (L) 10/09/2018    HCT 42.5 10/09/2018    MCV 84 10/09/2018     (L) 10/09/2018    Lab Results   Component Value Date    TSH 0.63 03/21/2016
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
## 2021-07-03 NOTE — ADDENDUM NOTE
Addendum Note by Iron Reinoso MD at 8/10/2018  1:21 PM     Author: Iron Reinoso MD Service: -- Author Type: Physician    Filed: 8/10/2018  1:21 PM Encounter Date: 8/9/2018 Status: Signed    : Iron Reinoso MD (Physician)    Addended by: IRON REINOSO on: 8/10/2018 01:21 PM        Modules accepted: Orders

## 2021-08-15 ENCOUNTER — HEALTH MAINTENANCE LETTER (OUTPATIENT)
Age: 56
End: 2021-08-15

## 2021-10-01 DIAGNOSIS — G44.209 MUSCLE TENSION HEADACHE: Primary | ICD-10-CM

## 2021-10-03 NOTE — TELEPHONE ENCOUNTER
"Routing refill request to provider for review/approval because:  Drug not active on patient's medication list    Last Written Prescription:      Last office visit provider:  1/28/21    Requested Prescriptions   Pending Prescriptions Disp Refills     SM PAIN RELIEVER 325 MG tablet [Pharmacy Med Name: SM Pain Reliever Oral Tablet 325 MG] 50 tablet 0     Sig: TAKE TWO TABLETS BY MOUTH EVERY FOUR HOURS AS NEEDED FOR PAIN       Analgesics (Non-Narcotic Tylenol and ASA Only) Failed - 10/1/2021 10:13 AM        Failed - Medication is active on med list        Passed - Recent (12 mo) or future (30 days) visit within the authorizing provider's specialty     Patient has had an office visit with the authorizing provider or a provider within the authorizing providers department within the previous 12 mos or has a future within next 30 days. See \"Patient Info\" tab in inbasket, or \"Choose Columns\" in Meds & Orders section of the refill encounter.              Passed - Patient is 7 months old or older     If patient is a peds patient of the age 7 mos -12 years, ok to refill using weight-based dosing.     If >3g daily and/or sig is not \"prn\", check for liver enzymes. If normal in the last year, ok to refill.  If not, refer to the provider.               Helen Betancourt RN 10/03/21 2:35 AM  "

## 2021-10-05 RX ORDER — ACETAMINOPHEN 325 MG/1
TABLET, COATED ORAL
Qty: 50 TABLET | Refills: 0 | Status: SHIPPED | OUTPATIENT
Start: 2021-10-05 | End: 2023-08-28

## 2021-10-11 ENCOUNTER — HEALTH MAINTENANCE LETTER (OUTPATIENT)
Age: 56
End: 2021-10-11

## 2021-11-03 ENCOUNTER — ANCILLARY PROCEDURE (OUTPATIENT)
Dept: GENERAL RADIOLOGY | Facility: CLINIC | Age: 56
End: 2021-11-03
Attending: FAMILY MEDICINE
Payer: MEDICAID

## 2021-11-03 ENCOUNTER — OFFICE VISIT (OUTPATIENT)
Dept: FAMILY MEDICINE | Facility: CLINIC | Age: 56
End: 2021-11-03
Payer: MEDICAID

## 2021-11-03 VITALS
BODY MASS INDEX: 27.99 KG/M2 | DIASTOLIC BLOOD PRESSURE: 72 MMHG | RESPIRATION RATE: 16 BRPM | OXYGEN SATURATION: 99 % | HEART RATE: 74 BPM | HEIGHT: 65 IN | WEIGHT: 168 LBS | SYSTOLIC BLOOD PRESSURE: 141 MMHG

## 2021-11-03 DIAGNOSIS — E11.9 TYPE 2 DIABETES MELLITUS WITHOUT COMPLICATION, WITHOUT LONG-TERM CURRENT USE OF INSULIN (H): ICD-10-CM

## 2021-11-03 DIAGNOSIS — R03.0 ELEVATED BLOOD PRESSURE READING WITHOUT DIAGNOSIS OF HYPERTENSION: ICD-10-CM

## 2021-11-03 DIAGNOSIS — M25.571 PAIN IN JOINT, ANKLE AND FOOT, RIGHT: ICD-10-CM

## 2021-11-03 DIAGNOSIS — Z00.00 HEALTHCARE MAINTENANCE: Primary | ICD-10-CM

## 2021-11-03 LAB
ALT SERPL W P-5'-P-CCNC: 38 U/L (ref 0–45)
ANION GAP SERPL CALCULATED.3IONS-SCNC: 12 MMOL/L (ref 5–18)
BUN SERPL-MCNC: 9 MG/DL (ref 8–22)
CALCIUM SERPL-MCNC: 10.4 MG/DL (ref 8.5–10.5)
CHLORIDE BLD-SCNC: 103 MMOL/L (ref 98–107)
CHOLEST SERPL-MCNC: 179 MG/DL
CO2 SERPL-SCNC: 24 MMOL/L (ref 22–31)
CREAT SERPL-MCNC: 1.08 MG/DL (ref 0.7–1.3)
CREAT UR-MCNC: 116 MG/DL
FASTING STATUS PATIENT QL REPORTED: NO
GFR SERPL CREATININE-BSD FRML MDRD: 77 ML/MIN/1.73M2
GLUCOSE BLD-MCNC: 141 MG/DL (ref 70–125)
HBA1C MFR BLD: 8.4 % (ref 0–5.6)
HDLC SERPL-MCNC: 33 MG/DL
LDLC SERPL CALC-MCNC: 112 MG/DL
MICROALBUMIN UR-MCNC: 5.4 MG/DL (ref 0–1.99)
MICROALBUMIN/CREAT UR: 46.6 MG/G CR
POTASSIUM BLD-SCNC: 4.3 MMOL/L (ref 3.5–5)
SODIUM SERPL-SCNC: 139 MMOL/L (ref 136–145)
TRIGL SERPL-MCNC: 172 MG/DL

## 2021-11-03 PROCEDURE — 83036 HEMOGLOBIN GLYCOSYLATED A1C: CPT | Performed by: FAMILY MEDICINE

## 2021-11-03 PROCEDURE — 91301 PR COVID VAC MODERNA 100 MCG/0.5 ML IM: CPT | Performed by: FAMILY MEDICINE

## 2021-11-03 PROCEDURE — 80061 LIPID PANEL: CPT | Performed by: FAMILY MEDICINE

## 2021-11-03 PROCEDURE — 90473 IMMUNE ADMIN ORAL/NASAL: CPT | Performed by: FAMILY MEDICINE

## 2021-11-03 PROCEDURE — 80048 BASIC METABOLIC PNL TOTAL CA: CPT | Performed by: FAMILY MEDICINE

## 2021-11-03 PROCEDURE — 99396 PREV VISIT EST AGE 40-64: CPT | Mod: 25 | Performed by: FAMILY MEDICINE

## 2021-11-03 PROCEDURE — 84460 ALANINE AMINO (ALT) (SGPT): CPT | Performed by: FAMILY MEDICINE

## 2021-11-03 PROCEDURE — 90672 LAIV4 VACCINE INTRANASAL: CPT | Performed by: FAMILY MEDICINE

## 2021-11-03 PROCEDURE — 73610 X-RAY EXAM OF ANKLE: CPT | Mod: TC | Performed by: RADIOLOGY

## 2021-11-03 PROCEDURE — 82043 UR ALBUMIN QUANTITATIVE: CPT | Performed by: FAMILY MEDICINE

## 2021-11-03 PROCEDURE — 36415 COLL VENOUS BLD VENIPUNCTURE: CPT | Performed by: FAMILY MEDICINE

## 2021-11-03 PROCEDURE — 0064A PR COVID VAC MODERNA 100 MCG/0.5 ML IM: CPT | Performed by: FAMILY MEDICINE

## 2021-11-03 ASSESSMENT — MIFFLIN-ST. JEOR: SCORE: 1522.04

## 2021-11-03 NOTE — ASSESSMENT & PLAN NOTE
Patient dropped a cover to a heavy pad, cast iron, that landed on his lateral epicondyle of his right ankle.  Was about a week and a half ago, still with some pain.  No visible signs of trauma.  X-ray personally reviewed and was negative.  Reassured

## 2021-11-03 NOTE — ASSESSMENT & PLAN NOTE
Initial blood pressure elevated, follow-up blood pressure borderline.  Patient brings in multiple blood pressure readings from home, average is about 140/90  Encouraged initiation of ACE inhibitor.  Patient declined at this time.
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
## 2021-11-03 NOTE — LETTER
November 3, 2021      Kurtis LIRA Parrish  [address]  [facility] [address]        To Whom It May Concern,      Kurtis should be allowed to sit/stand at liberty while at work due to chronic back pain.  Duration of these restrictions, long-term, for now today through 5/3/2022.        Sincerely,        Iron Reinoso MD

## 2021-11-03 NOTE — ASSESSMENT & PLAN NOTE
Diabetes poorly controlled, likely due to recent weight gain.  On Metformin 1000 twice daily is only medication.  We will add Invokana 100 mg.  Not on statin medication, await fasting lipid.  Blood pressure borderline/poorly controlled today.  Encouraged initiation of antihypertensive but he wishes to delay this at this point.

## 2021-11-04 DIAGNOSIS — R52 PAIN: Primary | ICD-10-CM

## 2021-11-04 DIAGNOSIS — D64.9 ANEMIA, UNSPECIFIED TYPE: ICD-10-CM

## 2021-11-04 RX ORDER — FERROUS GLUCONATE 324(38)MG
324 TABLET ORAL
Qty: 100 TABLET | Refills: 3 | Status: SHIPPED | OUTPATIENT
Start: 2021-11-04 | End: 2022-06-29

## 2021-11-04 RX ORDER — ACETAMINOPHEN 500 MG
500-1000 TABLET ORAL EVERY 6 HOURS PRN
Qty: 100 TABLET | Refills: 3 | Status: SHIPPED | OUTPATIENT
Start: 2021-11-04 | End: 2022-01-21

## 2021-11-04 NOTE — TELEPHONE ENCOUNTER
Reason for Call:  Medication or medication refill:    Do you use a [facility]?  Name of the pharmacy and phone number for the current request:  [facility] on [address]     Name of the medication requested:   Iron tablets  Tylenol     Other request: pt was seen yesterday  was going to send in the two prescriptions they havent received them yet    Can we leave a detailed message on this number? YES    Phone number patient can be reached at: Home number on file [phone] (home)    Best Time: anytime    Call taken on 11/4/2021 at 11:26 AM by Vanessa Beltran

## 2021-11-05 ENCOUNTER — TRANSFERRED RECORDS (OUTPATIENT)
Dept: HEALTH INFORMATION MANAGEMENT | Facility: CLINIC | Age: 56
End: 2021-11-05
Payer: MEDICAID

## 2021-11-05 LAB — RETINOPATHY: POSITIVE

## 2021-12-01 DIAGNOSIS — E11.9 TYPE 2 DIABETES MELLITUS WITHOUT COMPLICATION, WITHOUT LONG-TERM CURRENT USE OF INSULIN (H): Primary | ICD-10-CM

## 2021-12-02 RX ORDER — METFORMIN HCL 500 MG
1000 TABLET, EXTENDED RELEASE 24 HR ORAL 2 TIMES DAILY WITH MEALS
Qty: 360 TABLET | Refills: 1 | Status: SHIPPED | OUTPATIENT
Start: 2021-12-02 | End: 2022-05-17

## 2021-12-03 NOTE — TELEPHONE ENCOUNTER
" Disp Refills Start End MAINOR   metFORMIN (GLUCOPHAGE-XR) 500 MG 24 hr tablet 120 tablet 11 11/20/2020  No   Sig - Route: Take 2 tablets (1,000 mg total) by mouth 2 (two) times a day. - Oral   Sent to pharmacy as: metFORMIN  mg tablet,extended release 24 hr (GLUCOPHAGE-XR)   E-Prescribing Status: Receipt confirmed by pharmacy (11/20/2020  1:55 PM CST)     Last Written Prescription Date:  11/20/2020  Last Fill Quantity: 120,  # refills: 11   Last office visit provider:  11/03/2021 with Dr Reinoso.    Requested Prescriptions   Pending Prescriptions Disp Refills     metFORMIN (GLUCOPHAGE-XR) 500 MG 24 hr tablet       Sig: Take 2 tablets (1,000 mg) by mouth 2 times daily (with meals)       Biguanide Agents Passed - 12/1/2021  8:27 AM        Passed - Patient is age 10 or older        Passed - Patient has documented A1c within the specified period of time.     If HgbA1C is 8 or greater, it needs to be on file within the past 3 months.  If less than 8, must be on file within the past 6 months.     Recent Labs   Lab Test 11/03/21  1611   A1C 8.4*             Passed - Patient's CR is NOT>1.4 OR Patient's EGFR is NOT<45 within past 12 mos.     Recent Labs   Lab Test 11/03/21  1611 04/22/21  1409   GFRESTIMATED 77 >60   GFRESTBLACK  --  >60       Recent Labs   Lab Test 11/03/21  1611   CR 1.08             Passed - Patient does NOT have a diagnosis of CHF.        Passed - Medication is active on med list        Passed - Recent (6 mo) or future (30 days) visit within the authorizing provider's specialty     Patient had office visit in the last 6 months or has a visit in the next 30 days with authorizing provider or within the authorizing provider's specialty.  See \"Patient Info\" tab in inbasket, or \"Choose Columns\" in Meds & Orders section of the refill encounter.                 Aria Levine 12/02/21 11:07 PM  "

## 2022-01-21 ENCOUNTER — OFFICE VISIT (OUTPATIENT)
Dept: FAMILY MEDICINE | Facility: CLINIC | Age: 57
End: 2022-01-21
Payer: MEDICAID

## 2022-01-21 VITALS
RESPIRATION RATE: 18 BRPM | DIASTOLIC BLOOD PRESSURE: 68 MMHG | HEART RATE: 73 BPM | WEIGHT: 163 LBS | BODY MASS INDEX: 27.49 KG/M2 | SYSTOLIC BLOOD PRESSURE: 125 MMHG

## 2022-01-21 DIAGNOSIS — Z00.00 HEALTHCARE MAINTENANCE: ICD-10-CM

## 2022-01-21 DIAGNOSIS — R80.9 MICROALBUMINURIA: ICD-10-CM

## 2022-01-21 DIAGNOSIS — E11.9 TYPE 2 DIABETES MELLITUS WITHOUT COMPLICATION, WITHOUT LONG-TERM CURRENT USE OF INSULIN (H): Primary | ICD-10-CM

## 2022-01-21 DIAGNOSIS — R52 PAIN: ICD-10-CM

## 2022-01-21 DIAGNOSIS — D12.6 TUBULAR ADENOMA OF COLON: ICD-10-CM

## 2022-01-21 PROBLEM — M71.9 DISORDER OF BURSAE AND TENDONS IN SHOULDER REGION: Status: ACTIVE | Noted: 2022-01-21

## 2022-01-21 PROBLEM — K59.00 CONSTIPATION: Status: ACTIVE | Noted: 2022-01-21

## 2022-01-21 PROBLEM — R12 HEARTBURN: Status: ACTIVE | Noted: 2022-01-21

## 2022-01-21 PROBLEM — K29.90 GASTRITIS AND GASTRODUODENITIS: Status: ACTIVE | Noted: 2022-01-21

## 2022-01-21 PROBLEM — M77.12 LATERAL EPICONDYLITIS OF LEFT ELBOW: Status: ACTIVE | Noted: 2018-08-09

## 2022-01-21 PROBLEM — R06.83 SNORING: Status: ACTIVE | Noted: 2021-01-28

## 2022-01-21 PROBLEM — E78.5 DYSLIPIDEMIA: Status: ACTIVE | Noted: 2022-01-21

## 2022-01-21 PROBLEM — R39.15 URINARY URGENCY: Status: ACTIVE | Noted: 2020-05-12

## 2022-01-21 PROBLEM — M54.50 BILATERAL LOW BACK PAIN WITHOUT SCIATICA: Status: ACTIVE | Noted: 2018-08-09

## 2022-01-21 PROBLEM — D69.6 THROMBOCYTOPENIA (H): Status: ACTIVE | Noted: 2022-01-21

## 2022-01-21 PROBLEM — M67.919 DISORDER OF BURSAE AND TENDONS IN SHOULDER REGION: Status: ACTIVE | Noted: 2022-01-21

## 2022-01-21 PROBLEM — H54.2X12: Status: ACTIVE | Noted: 2022-01-21

## 2022-01-21 PROBLEM — H91.90 HEARING LOSS: Status: ACTIVE | Noted: 2022-01-21

## 2022-01-21 PROBLEM — K29.70 GASTRITIS AND GASTRODUODENITIS: Status: ACTIVE | Noted: 2022-01-21

## 2022-01-21 PROBLEM — H81.10 BENIGN PAROXYSMAL POSITIONAL VERTIGO: Status: ACTIVE | Noted: 2022-01-21

## 2022-01-21 PROBLEM — R03.0 ELEVATED BLOOD PRESSURE READING WITHOUT DIAGNOSIS OF HYPERTENSION: Status: ACTIVE | Noted: 2021-01-28

## 2022-01-21 PROBLEM — N52.9 ERECTILE DYSFUNCTION, UNSPECIFIED ERECTILE DYSFUNCTION TYPE: Status: ACTIVE | Noted: 2021-04-22

## 2022-01-21 PROBLEM — D64.9 ANEMIA, UNSPECIFIED TYPE: Status: ACTIVE | Noted: 2020-11-21

## 2022-01-21 LAB — HBA1C MFR BLD: 7.6 % (ref 0–5.6)

## 2022-01-21 PROCEDURE — 99214 OFFICE O/P EST MOD 30 MIN: CPT | Mod: 25 | Performed by: FAMILY MEDICINE

## 2022-01-21 PROCEDURE — 90471 IMMUNIZATION ADMIN: CPT | Performed by: FAMILY MEDICINE

## 2022-01-21 PROCEDURE — 83036 HEMOGLOBIN GLYCOSYLATED A1C: CPT | Performed by: FAMILY MEDICINE

## 2022-01-21 PROCEDURE — 90732 PPSV23 VACC 2 YRS+ SUBQ/IM: CPT | Performed by: FAMILY MEDICINE

## 2022-01-21 PROCEDURE — 36415 COLL VENOUS BLD VENIPUNCTURE: CPT | Performed by: FAMILY MEDICINE

## 2022-01-21 RX ORDER — ACETAMINOPHEN 500 MG
500-1000 TABLET ORAL EVERY 6 HOURS PRN
Qty: 100 TABLET | Refills: 3 | Status: SHIPPED | OUTPATIENT
Start: 2022-01-21 | End: 2022-06-29

## 2022-01-21 RX ORDER — LISINOPRIL 5 MG/1
5 TABLET ORAL DAILY
Qty: 30 TABLET | Refills: 3 | Status: SHIPPED | OUTPATIENT
Start: 2022-01-21 | End: 2022-05-17

## 2022-01-21 NOTE — ASSESSMENT & PLAN NOTE
Normal blood pressure on recheck today but significantly elevated microalbumin last check.  Discussed rationale behind ACE inhibitor for prevention of renal failure.  Patient agreeable.  Discussed potential side effects of this.

## 2022-01-21 NOTE — ASSESSMENT & PLAN NOTE
Acceptable control  Invokana recently added to metformin.  Some frequent urination but otherwise well-tolerated  Add lisinopril for microalbuminuria, 5 mg  Intolerant of statin  More than yearly eye exams.
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
## 2022-02-07 ENCOUNTER — TELEPHONE (OUTPATIENT)
Dept: FAMILY MEDICINE | Facility: CLINIC | Age: 57
End: 2022-02-07
Payer: MEDICAID

## 2022-02-07 DIAGNOSIS — D12.6 TUBULAR ADENOMA OF COLON: Primary | ICD-10-CM

## 2022-02-07 NOTE — TELEPHONE ENCOUNTER
Chart reviewed. Please review findings below.     Order Questions    Question Answer   Procedure: Upper Endoscopy   Upper Endoscopy Type: EGD   Sedation Preference: Per Protocol Moderate/Deep   Upper Endoscopy Reason for Procedure: previous polyp

## 2022-02-07 NOTE — TELEPHONE ENCOUNTER
Reason for Call:  Other     Detailed comments: patient is calling to say when [facility] called to schedule per referal, they said the order was for an egd. The order was supposed to be for a colonoscopy. They told the patient to call the clinic and ask that they change that order so he can schedule. He asked when it is changed if we can note that on his my chart so he knows when he can call [facility] and schedule?    Phone Number Patient can be reached at: Home number on file [phone] (home)    Best Time: asap    Can we leave a detailed message on this number? YES    Call taken on 2/7/2022 at 2:23 PM by Debra Jenkins

## 2022-02-15 DIAGNOSIS — Z11.59 ENCOUNTER FOR SCREENING FOR OTHER VIRAL DISEASES: Primary | ICD-10-CM

## 2022-03-05 ENCOUNTER — LAB (OUTPATIENT)
Dept: FAMILY MEDICINE | Facility: CLINIC | Age: 57
End: 2022-03-05
Attending: SURGERY
Payer: MEDICAID

## 2022-03-05 DIAGNOSIS — Z11.59 ENCOUNTER FOR SCREENING FOR OTHER VIRAL DISEASES: ICD-10-CM

## 2022-03-05 PROCEDURE — U0003 INFECTIOUS AGENT DETECTION BY NUCLEIC ACID (DNA OR RNA); SEVERE ACUTE RESPIRATORY SYNDROME CORONAVIRUS 2 (SARS-COV-2) (CORONAVIRUS DISEASE [COVID-19]), AMPLIFIED PROBE TECHNIQUE, MAKING USE OF HIGH THROUGHPUT TECHNOLOGIES AS DESCRIBED BY CMS-2020-01-R: HCPCS

## 2022-03-05 PROCEDURE — U0005 INFEC AGEN DETEC AMPLI PROBE: HCPCS

## 2022-03-06 LAB — SARS-COV-2 RNA RESP QL NAA+PROBE: NEGATIVE

## 2022-03-08 ENCOUNTER — ANESTHESIA EVENT (OUTPATIENT)
Dept: SURGERY | Facility: AMBULATORY SURGERY CENTER | Age: 57
End: 2022-03-08
Payer: MEDICAID

## 2022-03-08 RX ORDER — BRIMONIDINE TARTRATE AND TIMOLOL MALEATE 2; 5 MG/ML; MG/ML
SOLUTION OPHTHALMIC
COMMUNITY
Start: 2022-02-26

## 2022-03-08 RX ORDER — DORZOLAMIDE HCL 20 MG/ML
SOLUTION/ DROPS OPHTHALMIC
COMMUNITY
Start: 2022-01-31

## 2022-03-08 RX ORDER — PREDNISOLONE ACETATE 10 MG/ML
SUSPENSION/ DROPS OPHTHALMIC
COMMUNITY
Start: 2022-02-26

## 2022-03-09 ENCOUNTER — HOSPITAL ENCOUNTER (OUTPATIENT)
Facility: AMBULATORY SURGERY CENTER | Age: 57
End: 2022-03-09
Attending: SURGERY
Payer: MEDICAID

## 2022-03-09 ENCOUNTER — ANESTHESIA (OUTPATIENT)
Dept: SURGERY | Facility: AMBULATORY SURGERY CENTER | Age: 57
End: 2022-03-09
Payer: MEDICAID

## 2022-03-09 VITALS
TEMPERATURE: 96.9 F | OXYGEN SATURATION: 100 % | SYSTOLIC BLOOD PRESSURE: 141 MMHG | HEIGHT: 65 IN | WEIGHT: 162 LBS | RESPIRATION RATE: 16 BRPM | HEART RATE: 66 BPM | BODY MASS INDEX: 26.99 KG/M2 | DIASTOLIC BLOOD PRESSURE: 78 MMHG

## 2022-03-09 DIAGNOSIS — D12.6 TUBULAR ADENOMA OF COLON: ICD-10-CM

## 2022-03-09 LAB — GLUCOSE SERPL-MCNC: 143 MG/DL (ref 70–99)

## 2022-03-09 PROCEDURE — 45380 COLONOSCOPY AND BIOPSY: CPT | Mod: 59 | Performed by: SURGERY

## 2022-03-09 PROCEDURE — 45385 COLONOSCOPY W/LESION REMOVAL: CPT | Performed by: SURGERY

## 2022-03-09 RX ORDER — FENTANYL CITRATE 0.05 MG/ML
25 INJECTION, SOLUTION INTRAMUSCULAR; INTRAVENOUS EVERY 5 MIN PRN
Status: DISCONTINUED | OUTPATIENT
Start: 2022-03-09 | End: 2022-03-10 | Stop reason: HOSPADM

## 2022-03-09 RX ORDER — ONDANSETRON 4 MG/1
4 TABLET, ORALLY DISINTEGRATING ORAL EVERY 30 MIN PRN
Status: DISCONTINUED | OUTPATIENT
Start: 2022-03-09 | End: 2022-03-10 | Stop reason: HOSPADM

## 2022-03-09 RX ORDER — PROPOFOL 10 MG/ML
INJECTION, EMULSION INTRAVENOUS CONTINUOUS PRN
Status: DISCONTINUED | OUTPATIENT
Start: 2022-03-09 | End: 2022-03-09

## 2022-03-09 RX ORDER — OXYCODONE HYDROCHLORIDE 5 MG/1
5 TABLET ORAL EVERY 4 HOURS PRN
Status: DISCONTINUED | OUTPATIENT
Start: 2022-03-09 | End: 2022-03-10 | Stop reason: HOSPADM

## 2022-03-09 RX ORDER — SODIUM CHLORIDE, SODIUM LACTATE, POTASSIUM CHLORIDE, CALCIUM CHLORIDE 600; 310; 30; 20 MG/100ML; MG/100ML; MG/100ML; MG/100ML
INJECTION, SOLUTION INTRAVENOUS CONTINUOUS
Status: DISCONTINUED | OUTPATIENT
Start: 2022-03-09 | End: 2022-03-10 | Stop reason: HOSPADM

## 2022-03-09 RX ORDER — PROPOFOL 10 MG/ML
INJECTION, EMULSION INTRAVENOUS PRN
Status: DISCONTINUED | OUTPATIENT
Start: 2022-03-09 | End: 2022-03-09

## 2022-03-09 RX ORDER — LIDOCAINE HYDROCHLORIDE 20 MG/ML
INJECTION, SOLUTION INFILTRATION; PERINEURAL PRN
Status: DISCONTINUED | OUTPATIENT
Start: 2022-03-09 | End: 2022-03-09

## 2022-03-09 RX ORDER — LIDOCAINE 40 MG/G
CREAM TOPICAL
Status: DISCONTINUED | OUTPATIENT
Start: 2022-03-09 | End: 2022-03-10 | Stop reason: HOSPADM

## 2022-03-09 RX ORDER — HYDROMORPHONE HCL IN WATER/PF 6 MG/30 ML
0.2 PATIENT CONTROLLED ANALGESIA SYRINGE INTRAVENOUS EVERY 5 MIN PRN
Status: DISCONTINUED | OUTPATIENT
Start: 2022-03-09 | End: 2022-03-10 | Stop reason: HOSPADM

## 2022-03-09 RX ORDER — FENTANYL CITRATE 0.05 MG/ML
25 INJECTION, SOLUTION INTRAMUSCULAR; INTRAVENOUS
Status: DISCONTINUED | OUTPATIENT
Start: 2022-03-09 | End: 2022-03-10 | Stop reason: HOSPADM

## 2022-03-09 RX ORDER — GLYCOPYRROLATE 0.2 MG/ML
INJECTION, SOLUTION INTRAMUSCULAR; INTRAVENOUS PRN
Status: DISCONTINUED | OUTPATIENT
Start: 2022-03-09 | End: 2022-03-09

## 2022-03-09 RX ORDER — ONDANSETRON 2 MG/ML
4 INJECTION INTRAMUSCULAR; INTRAVENOUS EVERY 30 MIN PRN
Status: DISCONTINUED | OUTPATIENT
Start: 2022-03-09 | End: 2022-03-10 | Stop reason: HOSPADM

## 2022-03-09 RX ORDER — MEPERIDINE HYDROCHLORIDE 25 MG/ML
12.5 INJECTION INTRAMUSCULAR; INTRAVENOUS; SUBCUTANEOUS
Status: DISCONTINUED | OUTPATIENT
Start: 2022-03-09 | End: 2022-03-10 | Stop reason: HOSPADM

## 2022-03-09 RX ADMIN — SODIUM CHLORIDE, SODIUM LACTATE, POTASSIUM CHLORIDE, CALCIUM CHLORIDE: 600; 310; 30; 20 INJECTION, SOLUTION INTRAVENOUS at 08:19

## 2022-03-09 RX ADMIN — PROPOFOL 250 MCG/KG/MIN: 10 INJECTION, EMULSION INTRAVENOUS at 08:21

## 2022-03-09 RX ADMIN — PROPOFOL 30 MG: 10 INJECTION, EMULSION INTRAVENOUS at 08:23

## 2022-03-09 RX ADMIN — LIDOCAINE HYDROCHLORIDE 50 MG: 20 INJECTION, SOLUTION INFILTRATION; PERINEURAL at 08:21

## 2022-03-09 RX ADMIN — GLYCOPYRROLATE 0.2 MG: 0.2 INJECTION, SOLUTION INTRAMUSCULAR; INTRAVENOUS at 08:21

## 2022-03-09 NOTE — DISCHARGE INSTRUCTIONS
Colonoscopy  Colonoscopy is a test to view the inside of your lower digestive tract (colon and rectum). Sometimes it can show the last part of the small intestine (ileum). During the test, small pieces of tissue may be removed for testing. This is called a biopsy. Small growths, such as polyps, may also be removed.       A camera attached to a flexible tube with a viewing lens is used to take video pictures.   Why is colonoscopy done?  The test is done to help look for colon cancer. And it can help find the source of abdominal pain, bleeding, and changes in bowel habits. It may be needed once a year to every 10 years, depending on factors such as your:     Age    Health history    Family health history    Symptoms    Results from any prior colonoscopy  Risks and possible complications  These include:    Bleeding                 A puncture or tear in the colon     Risks of anesthesia    A cancer lesion not being seen or fully removed  Getting ready   To prepare for the test:    Talk with your healthcare provider about the risks of the test (see below). Also ask your healthcare provider about alternatives to the test.    Tell your healthcare provider about any medicines and supplements you take. Also tell him or her about any health conditions you may have.    Make sure your rectum and colon are empty for the test. Follow the diet and bowel prep instructions exactly. If you don t, the test may need to be rescheduled.    Plan for a friend or family member to drive you home after the test.    You may discuss the results with your doctor right away or at a future visit.   During the test   The test is usually done in the hospital on an outpatient basis or at an outpatient clinic. This means you go home the same day. The procedure takes about 30 minutes. During that time:     You are given relaxing (sedating) medicine through an IV line. You may be drowsy, or fully asleep.    The healthcare provider will first give you  a physical exam to check for anal and rectal problems.    Then the anus is lubricated and the scope inserted.    If you are awake, you may have a feeling similar to needing to have a bowel movement. You may also feel pressure as air is pumped into the colon. It s OK to pass gas during the procedure.    Biopsy, polyp removal, or other treatments may be done during the test.     Colonoscopy provides an inside view of the entire colon.   After the test   You may have gas right after the test. It can help to try to pass it to help prevent later bloating. Your healthcare provider may discuss the results with you right away. Or you may need to schedule a follow-up visit to talk about the results. After the test, you can go back to your normal eating and other activities. You may be tired from the sedation and need to rest for a few hours. Discuss your medicines with your provider to understand if they can be restarted right away.   When to call your healthcare provider  Call your healthcare provider if you have any of the following after the procedure:     Pain in your belly    Fever of 100.4 F (38 C) or higher, or as directed by your provider    Rectal bleeding    Nausea or vomiting  [employer] last reviewed this educational content on 6/1/2019 2000-2021 [employer]. All rights reserved. This information is not intended as a substitute for professional medical care. Always follow your healthcare professional's instructions.      Dr. Treviño's Office [phone]

## 2022-03-09 NOTE — ANESTHESIA CARE TRANSFER NOTE
Patient: Kurtis Senior    Procedure: Procedure(s):  COLONOSCOPY       Diagnosis: Tubular adenoma of colon [D12.6]  Diagnosis Additional Information: No value filed.    Anesthesia Type:   MAC     Note:    Oropharynx: oropharynx clear of all foreign objects  Level of Consciousness: drowsy  Oxygen Supplementation: face mask  Level of Supplemental Oxygen (L/min / FiO2): 8  Independent Airway: airway patency satisfactory and stable  Dentition: dentition unchanged  Vital Signs Stable: post-procedure vital signs reviewed and stable  Report to RN Given: handoff report given  Patient transferred to: Phase II    Handoff Report: Identifed the Patient, Identified the Reponsible Provider, Reviewed the pertinent medical history, Discussed the surgical course, Reviewed Intra-OP anesthesia mangement and issues during anesthesia, Set expectations for post-procedure period and Allowed opportunity for questions and acknowledgement of understanding      Vitals:  Vitals Value Taken Time   BP 98/64    Temp 36C    Pulse 72    Resp 14    SpO2 99        Electronically Signed By: YADIRA Wells CRNA  March 9, 2022  9:04 AM

## 2022-03-09 NOTE — OP NOTE
[facility]    Operative Note    Pre-operative diagnosis: Tubular adenoma of colon [D12.6]  Post-operative diagnosis Same as pre-operative diagnosis    Procedure: Procedure(s):  COLONOSCOPY  Surgeon: Surgeon(s) and Role:     * Bryan Treviño DO - Primary  Anesthesia: Monitor Anesthesia Care   Estimated Blood Loss: 2 mL from 3/9/2022  8:23 AM to 3/9/2022  9:02 AM      Drains: None  Specimens:   ID Type Source Tests Collected by Time Destination   1 : Descending colon / hepatic flexure polyp Biopsy Large Intestine, Colon, Descending SURGICAL PATHOLOGY EXAM Bryan Treviño DO 3/9/2022  8:45 AM    2 : Descending Colon polyp Polyp Large Intestine, Colon, Descending SURGICAL PATHOLOGY EXAM Bryan Treviño DO 3/9/2022  8:55 AM      Findings:     -X1 multilobulated polyp sessile in nature in the descending colon near the splenic flexure.  -X2 small sessile polyps in close proximity to each other in the distal descending colon.     Complications: None.  Implants: * No implants in log *      Colonoscopy prep: good    Indication:     Procedure: The patient was brought back to the operating room and placed in a left lateral decubitus position.  The patient underwent MAC sedation by the anesthesia team. After  adequate conscious sedation was achieved, a digital exam was performed.  The patient was noted to have normal prostate and normal sphincter tone. The colonoscope was introduced  into the rectum and advanced under direct visualization to the terminal ileum.    The terminal ileum and the appendiceal orifice was identified by visual landmarks. The scope was subsequently removed slowly while carefully examining the color, texture, anatomy and integrity of the mucosa on the way out. Evaluation of the colon showed a multilobulated polyp sessile in nature at the descending colon near the splenic flexure.  This polyp measured approximately 6 to 8 mm in size.  The polyp was removed using cold forcep biopsies and a hot snare in a  piecemeal fashion.  The patient was also noted to have x2 small sessile polyps measuring approximately 2 mm in size in close proximity to each other towards the distal descending colon.  Both of these polyps were removed using cold forcep biopsies in the rectum, the scope was retroflexed and evaluation showed normal findings.  The colonoscope was then fully removed and the procedure was completed.  The patient was subsequently transferred to the recovery area in satisfactory condition.     Recommendations:   1.  Repeat colonoscopy recommendations to follow once the final pathology has been completed.      Bryan Treviño DO  General Surgeon  [facility]  Surgery [address] [address]?  Office: [phone]  Employed by - [employer]  Pager: [phone]

## 2022-03-09 NOTE — ANESTHESIA POSTPROCEDURE EVALUATION
Patient: Kurtis Senior    Procedure: Procedure(s):  COLONOSCOPY       Anesthesia Type:  MAC    Note:  Disposition: Outpatient   Postop Pain Control: Uneventful            Sign Out: Well controlled pain   PONV: No   Neuro/Psych: Uneventful            Sign Out: Acceptable/Baseline neuro status   Airway/Respiratory: Uneventful            Sign Out: Acceptable/Baseline resp. status   CV/Hemodynamics: Uneventful            Sign Out: Acceptable CV status; No obvious hypovolemia; No obvious fluid overload   Other NRE: NONE   DID A NON-ROUTINE EVENT OCCUR? No           Last vitals:  Vitals Value Taken Time   /84 03/09/22 1001   Temp 96.9  F (36.1  C) 03/09/22 0904   Pulse 68 03/09/22 1001   Resp 16 03/09/22 0904   SpO2 100 % 03/09/22 1001   Vitals shown include unvalidated device data.    Electronically Signed By: HOLGER CURTIS MD  March 9, 2022  10:10 AM
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
## 2022-03-11 ENCOUNTER — TELEPHONE (OUTPATIENT)
Dept: SURGERY | Facility: CLINIC | Age: 57
End: 2022-03-11
Payer: MEDICAID

## 2022-03-11 NOTE — TELEPHONE ENCOUNTER
----- Message from Bryan Treviño DO sent at 3/10/2022  1:23 PM CST -----  Please let the patient know that the biopsy samples came back.  Specimen showed a precancerous polyp.  The other specimen showed nonprecancerous polyps.  Given these findings, recommendation is repeat colonoscopy in 5 years.    Thanks,     LV

## 2022-03-11 NOTE — TELEPHONE ENCOUNTER
Patient notified of colonoscopy results. Next colonoscopy is in 5 years. Care Gaps updated. Patient expressed understanding.        [facility]      Cristina Irving RN  [facility]  General Surgery  [facility] [address]  [email or web address]   Office:[phone]  Employed by [facility],

## 2022-05-13 DIAGNOSIS — E11.9 TYPE 2 DIABETES MELLITUS WITHOUT COMPLICATION, WITHOUT LONG-TERM CURRENT USE OF INSULIN (H): ICD-10-CM

## 2022-05-13 DIAGNOSIS — R80.9 MICROALBUMINURIA: ICD-10-CM

## 2022-05-13 NOTE — TELEPHONE ENCOUNTER
Pending Prescriptions:                       Disp   Refills    metFORMIN (GLUCOPHAGE XR) 500 MG 24 hr ta*360 ta*1            Sig: Take 2 tablets (1,000 mg) by mouth 2 times daily           (with meals)    lisinopril (ZESTRIL) 5 MG tablet          30 tab*3            Sig: Take 1 tablet (5 mg) by mouth daily

## 2022-05-16 NOTE — TELEPHONE ENCOUNTER
"Routing refill request to provider for review/approval because:  bp out of range    Last Written Prescription Date:  1/21/22  Last Fill Quantity: 30,  # refills: 3   Last office visit provider:  1/21/22     Requested Prescriptions   Pending Prescriptions Disp Refills     metFORMIN (GLUCOPHAGE XR) 500 MG 24 hr tablet 360 tablet 1     Sig: Take 2 tablets (1,000 mg) by mouth 2 times daily (with meals)       Biguanide Agents Passed - 5/13/2022  4:34 PM        Passed - Patient is age 10 or older        Passed - Patient has documented A1c within the specified period of time.     If HgbA1C is 8 or greater, it needs to be on file within the past 3 months.  If less than 8, must be on file within the past 6 months.     Recent Labs   Lab Test 01/21/22  1626   A1C 7.6*             Passed - Patient's CR is NOT>1.4 OR Patient's EGFR is NOT<45 within past 12 mos.     Recent Labs   Lab Test 11/03/21  1611 04/22/21  1409   GFRESTIMATED 77 >60   GFRESTBLACK  --  >60       Recent Labs   Lab Test 11/03/21  1611   CR 1.08             Passed - Patient does NOT have a diagnosis of CHF.        Passed - Medication is active on med list        Passed - Recent (6 mo) or future (30 days) visit within the authorizing provider's specialty     Patient had office visit in the last 6 months or has a visit in the next 30 days with authorizing provider or within the authorizing provider's specialty.  See \"Patient Info\" tab in inbasket, or \"Choose Columns\" in Meds & Orders section of the refill encounter.               lisinopril (ZESTRIL) 5 MG tablet 30 tablet 3     Sig: Take 1 tablet (5 mg) by mouth daily       ACE Inhibitors (Including Combos) Protocol Failed - 5/13/2022  4:34 PM        Failed - Blood pressure under 140/90 in past 12 months     BP Readings from Last 3 Encounters:   03/09/22 (!) 141/78   01/21/22 125/68   11/03/21 (!) 141/72                 Passed - Recent (12 mo) or future (30 days) visit within the authorizing provider's specialty " "    Patient has had an office visit with the authorizing provider or a provider within the authorizing providers department within the previous 12 mos or has a future within next 30 days. See \"Patient Info\" tab in inbasket, or \"Choose Columns\" in Meds & Orders section of the refill encounter.              Passed - Medication is active on med list        Passed - Patient is age 18 or older        Passed - Normal serum creatinine on file in past 12 months     Recent Labs   Lab Test 11/03/21  1611   CR 1.08       Ok to refill medication if creatinine is low          Passed - Normal serum potassium on file in past 12 months     Recent Labs   Lab Test 11/03/21  1611   POTASSIUM 4.3                  Bonita Stewart, RN 05/15/22 7:14 PM  "

## 2022-05-17 RX ORDER — METFORMIN HCL 500 MG
1000 TABLET, EXTENDED RELEASE 24 HR ORAL 2 TIMES DAILY WITH MEALS
Qty: 360 TABLET | Refills: 1 | Status: SHIPPED | OUTPATIENT
Start: 2022-05-17 | End: 2022-06-29

## 2022-05-17 RX ORDER — LISINOPRIL 5 MG/1
5 TABLET ORAL DAILY
Qty: 30 TABLET | Refills: 3 | Status: SHIPPED | OUTPATIENT
Start: 2022-05-17 | End: 2022-06-29

## 2022-06-09 ENCOUNTER — TRANSFERRED RECORDS (OUTPATIENT)
Dept: HEALTH INFORMATION MANAGEMENT | Facility: CLINIC | Age: 57
End: 2022-06-09
Payer: MEDICAID

## 2022-06-09 DIAGNOSIS — G44.209 MUSCLE TENSION HEADACHE: Primary | ICD-10-CM

## 2022-06-10 RX ORDER — IBUPROFEN 600 MG/1
600 TABLET, FILM COATED ORAL EVERY 6 HOURS PRN
Qty: 50 TABLET | Refills: 1 | Status: SHIPPED | OUTPATIENT
Start: 2022-06-10 | End: 2022-06-29

## 2022-06-10 NOTE — TELEPHONE ENCOUNTER
Outpatient Medication Detail     Disp Refills Start End MAINOR   ibuprofen (ADVIL,MOTRIN) 600 MG tablet 40 tablet 0 11/10/2020  No   Sig - Route: Take 1 tablet (600 mg total) by mouth every 6 (six) hours as needed for pain. - Oral   Sent to pharmacy as: ibuprofen 600 mg tablet (ADVIL,MOTRIN)   E-Prescribing Status: Receipt confirmed by pharmacy (11/10/2020  6:39 PM CST)       ibuprofen (ADVIL,MOTRIN) 600 MG tablet [[id number]]    Electronically signed by: Iron Reinoso MD on 11/10/20 1839 Status: Active   Ordering user: Iron Reinoso MD 11/10/20 1839 Authorized by: Iron Reinoso MD   PRN reasons: pain   Frequency: Q6H PRN 11/10/20 - Until Discontinued Released by: Iron Reinoso MD 11/10/20 1839   Diagnoses  Muscle tension headache [G44.209]     Routing refill request to provider for review/approval because:  Labs not current:  Multiple  A break in medication  BP not in range.    Last office visit provider:  1/21/22     Requested Prescriptions   Pending Prescriptions Disp Refills     ibuprofen (ADVIL/MOTRIN) 600 MG tablet       Sig: Take 1 tablet (600 mg) by mouth every 6 hours as needed for moderate pain       NSAID Medications Failed - 6/10/2022  9:42 AM        Failed - Blood pressure under 140/90 in past 12 months     BP Readings from Last 3 Encounters:   03/09/22 (!) 141/78   01/21/22 125/68   11/03/21 (!) 141/72                 Failed - Normal AST on file in past 12 months     Recent Labs   Lab Test 08/09/18  1423   AST 48*             Failed - Normal CBC on file in past 12 months     Recent Labs   Lab Test 11/20/20  1436 05/01/19  1329 10/09/18  1332   WBC  --   --  4.7   RBC  --   --  5.07   HGB 13.2*   < > 13.9*   HCT  --   --  42.5   PLT  --   --  126*    < > = values in this interval not displayed.                 Failed - Medication is active on med list        Passed - Normal ALT on file in past 12 months     Recent Labs   Lab Test 11/03/21  1611   ALT 38             Passed  "- Recent (12 mo) or future (30 days) visit within the authorizing provider's specialty     Patient has had an office visit with the authorizing provider or a provider within the authorizing providers department within the previous 12 mos or has a future within next 30 days. See \"Patient Info\" tab in inbasket, or \"Choose Columns\" in Meds & Orders section of the refill encounter.              Passed - Patient is age 6-64 years        Passed - Normal serum creatinine on file in past 12 months     Recent Labs   Lab Test 11/03/21  1611   CR 1.08       Ok to refill medication if creatinine is low               Ruben Salcedo RN 06/10/22 9:43 AM  "

## 2022-06-14 ENCOUNTER — VIRTUAL VISIT (OUTPATIENT)
Dept: FAMILY MEDICINE | Facility: CLINIC | Age: 57
End: 2022-06-14
Payer: MEDICAID

## 2022-06-14 DIAGNOSIS — S99.922S INJURY OF LEFT FOOT, SEQUELA: Primary | ICD-10-CM

## 2022-06-14 PROCEDURE — 99213 OFFICE O/P EST LOW 20 MIN: CPT | Mod: 93 | Performed by: FAMILY MEDICINE

## 2022-06-14 RX ORDER — BRINZOLAMIDE 10 MG/ML
SUSPENSION/ DROPS OPHTHALMIC
COMMUNITY
Start: 2021-12-28

## 2022-06-14 RX ORDER — METFORMIN HCL 500 MG
1000 TABLET, EXTENDED RELEASE 24 HR ORAL
COMMUNITY
End: 2023-08-28
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
## 2022-06-14 NOTE — PROGRESS NOTES
Kurtis is a 56 year old who is being evaluated via a billable video visit.      How would you like to obtain your AVS? Edouard  If the video visit is dropped, the invitation should be resent by: Text to cell phone: [phone]  Will anyone else be joining your video visit? 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  He was trying to reach something on the top of a tall package, unable to do so, had one of his coworkers lowered it down with a crane and it ended up on his foot.-Or at least this is how I understand the injury  Left foot was crushed.  Broke bones in toes 2 through 4.  It sounds like probably the metatarsals  Initially seen by occupational medicine physician  Referred to [facility] who saw him on 6/9 or 6/10  Diagnosed with above fractures, given a fabricated cast, given crutches, told that he did not need surgery but is to follow-up on 6/24  No x-rays were done    Work restrictions given to return to work.  Work restrictions basically said that he cannot do anything but sit, that he cannot walk around at work or stand and that he needs help getting transportation as he usually takes the bus and will not be able to get to the bus stop    Patient indicates that he has had a great deal of difficulty with [employer]-they have canceled.  He cannot afford [employer] but has had to take a ride with [employer].  He has a lot of pain at work.    1. Injury of left foot, sequela  Discussed with patient that I generally insist that patients have restrictions rather than work release.  I think in this case, with such difficulty with transportation, I think it is unreasonable that we asked him to work and I wrote a work release through his next orthopedics visit.  Stressed importance with follow-up with orthopedics.  I certainly agree with their care.  Patient has some pain in his buttock that came from the fall, see his is occupational  medicine doctor and I think he should take this up with them.      On video today, left foot dorsum look quite swollen.  Video-Visit Details    Type of service:  Video Visit    Video End Time:533    Originating Location (pt. Location): Home    Distant Location (provider location):  [facility]     Platform used for Video Visit: Proxible

## 2022-06-14 NOTE — LETTER
June 14, 2022      Kurtis LIRA Parrish  [address]  [place] [address]        To Whom It May Concern,   Kurtis was seen virtually today today for injury sustained in the workplace 6/7/2022.  He should be excused from work due to injury 6/10 through 6/23/2022 .  Further restrictions per specialist.  Is scheduled to see 6/23/2022.  I am aware that restrictions were written by specialist on 6/9 or 6/10 but these new restrictions replace those.        Sincerely,        Iron Reinoso MD

## 2022-06-24 ENCOUNTER — TELEPHONE (OUTPATIENT)
Dept: FAMILY MEDICINE | Facility: CLINIC | Age: 57
End: 2022-06-24

## 2022-06-24 NOTE — TELEPHONE ENCOUNTER
Forms/Letter Request    Name of form/letter: request for extension off work from today 6/24 until 6/29 when patient comes back to see Dr. Reinoso. He went to [facility] yesterday. Swelling in his leg has not improved.    Have you been seen for this request: Yes 6/14/22    Do we have the form/letter: No    When is form/letter needed by: today    How would you like the form/letter returned: via [employer]    Patient Notified form requests are processed in 3-5 business days:Yes    Okay to leave a detailed message? Yes Cell number on file:    Telephone Information:   Mobile [phone]

## 2022-06-24 NOTE — TELEPHONE ENCOUNTER
Called and relayed message to patient.  He will contact orthopedics for work restrictions. Sounds like they gave him restrictions but no time off

## 2022-06-24 NOTE — TELEPHONE ENCOUNTER
No, I indicated that further work release-restrictions need to come from orthopedics.  He was to see him today.  I know this is hard but they are the ones who are taking care of this and making recommendations.

## 2022-06-29 ENCOUNTER — OFFICE VISIT (OUTPATIENT)
Dept: FAMILY MEDICINE | Facility: CLINIC | Age: 57
End: 2022-06-29
Payer: MEDICAID

## 2022-06-29 VITALS
WEIGHT: 161 LBS | TEMPERATURE: 97.5 F | RESPIRATION RATE: 16 BRPM | OXYGEN SATURATION: 100 % | DIASTOLIC BLOOD PRESSURE: 74 MMHG | SYSTOLIC BLOOD PRESSURE: 119 MMHG | BODY MASS INDEX: 26.79 KG/M2 | HEART RATE: 61 BPM

## 2022-06-29 DIAGNOSIS — D12.6 TUBULAR ADENOMA OF COLON: ICD-10-CM

## 2022-06-29 DIAGNOSIS — Z12.5 SCREENING FOR PROSTATE CANCER: ICD-10-CM

## 2022-06-29 DIAGNOSIS — R03.0 ELEVATED BLOOD PRESSURE READING WITHOUT DIAGNOSIS OF HYPERTENSION: ICD-10-CM

## 2022-06-29 DIAGNOSIS — R52 PAIN: ICD-10-CM

## 2022-06-29 DIAGNOSIS — R80.9 MICROALBUMINURIA: ICD-10-CM

## 2022-06-29 DIAGNOSIS — D69.6 THROMBOCYTOPENIA (H): ICD-10-CM

## 2022-06-29 DIAGNOSIS — N40.1 BENIGN PROSTATIC HYPERPLASIA WITH URINARY FREQUENCY: ICD-10-CM

## 2022-06-29 DIAGNOSIS — E11.9 TYPE 2 DIABETES MELLITUS WITHOUT COMPLICATION, WITHOUT LONG-TERM CURRENT USE OF INSULIN (H): Primary | ICD-10-CM

## 2022-06-29 DIAGNOSIS — R35.0 BENIGN PROSTATIC HYPERPLASIA WITH URINARY FREQUENCY: ICD-10-CM

## 2022-06-29 DIAGNOSIS — D64.9 ANEMIA, UNSPECIFIED TYPE: ICD-10-CM

## 2022-06-29 LAB
ALBUMIN UR-MCNC: NEGATIVE MG/DL
ANION GAP SERPL CALCULATED.3IONS-SCNC: 16 MMOL/L (ref 7–15)
APPEARANCE UR: CLEAR
BACTERIA #/AREA URNS HPF: ABNORMAL /HPF
BILIRUB UR QL STRIP: NEGATIVE
BUN SERPL-MCNC: 7.6 MG/DL (ref 6–20)
CALCIUM SERPL-MCNC: 8.9 MG/DL (ref 8.6–10)
CHLORIDE SERPL-SCNC: 101 MMOL/L (ref 98–107)
COLOR UR AUTO: YELLOW
CREAT SERPL-MCNC: 0.75 MG/DL (ref 0.67–1.17)
DEPRECATED HCO3 PLAS-SCNC: 22 MMOL/L (ref 22–29)
GFR SERPL CREATININE-BSD FRML MDRD: >90 ML/MIN/1.73M2
GLUCOSE SERPL-MCNC: 95 MG/DL (ref 70–99)
GLUCOSE UR STRIP-MCNC: >=1000 MG/DL
HBA1C MFR BLD: 6.9 % (ref 0–5.6)
HGB BLD-MCNC: 14.6 G/DL (ref 13.3–17.7)
HGB UR QL STRIP: NEGATIVE
KETONES UR STRIP-MCNC: NEGATIVE MG/DL
LEUKOCYTE ESTERASE UR QL STRIP: NEGATIVE
NITRATE UR QL: NEGATIVE
PH UR STRIP: 6 [PH] (ref 5–8)
POTASSIUM SERPL-SCNC: 4.5 MMOL/L (ref 3.4–5.3)
PSA SERPL-MCNC: 1.38 NG/ML (ref 0–3.5)
RBC #/AREA URNS AUTO: ABNORMAL /HPF
SODIUM SERPL-SCNC: 139 MMOL/L (ref 136–145)
SP GR UR STRIP: <=1.005 (ref 1–1.03)
SQUAMOUS #/AREA URNS AUTO: ABNORMAL /LPF
UROBILINOGEN UR STRIP-ACNC: 0.2 E.U./DL
WBC #/AREA URNS AUTO: ABNORMAL /HPF

## 2022-06-29 PROCEDURE — 36415 COLL VENOUS BLD VENIPUNCTURE: CPT | Performed by: FAMILY MEDICINE

## 2022-06-29 PROCEDURE — 83036 HEMOGLOBIN GLYCOSYLATED A1C: CPT | Performed by: FAMILY MEDICINE

## 2022-06-29 PROCEDURE — 81001 URINALYSIS AUTO W/SCOPE: CPT | Performed by: FAMILY MEDICINE

## 2022-06-29 PROCEDURE — 99214 OFFICE O/P EST MOD 30 MIN: CPT | Performed by: FAMILY MEDICINE

## 2022-06-29 PROCEDURE — G0103 PSA SCREENING: HCPCS | Performed by: FAMILY MEDICINE

## 2022-06-29 PROCEDURE — 85018 HEMOGLOBIN: CPT | Performed by: FAMILY MEDICINE

## 2022-06-29 PROCEDURE — 80048 BASIC METABOLIC PNL TOTAL CA: CPT | Performed by: FAMILY MEDICINE

## 2022-06-29 RX ORDER — TAMSULOSIN HYDROCHLORIDE 0.4 MG/1
0.4 CAPSULE ORAL DAILY
Qty: 90 CAPSULE | Refills: 3 | Status: SHIPPED | OUTPATIENT
Start: 2022-06-29 | End: 2023-08-28

## 2022-06-29 RX ORDER — FERROUS GLUCONATE 324(38)MG
324 TABLET ORAL
Qty: 100 TABLET | Refills: 3 | Status: SHIPPED | OUTPATIENT
Start: 2022-06-29 | End: 2023-08-28

## 2022-06-29 RX ORDER — ACETAMINOPHEN 500 MG
500-1000 TABLET ORAL EVERY 6 HOURS PRN
Qty: 100 TABLET | Refills: 3 | Status: SHIPPED | OUTPATIENT
Start: 2022-06-29 | End: 2023-08-01

## 2022-06-29 RX ORDER — METFORMIN HCL 500 MG
1000 TABLET, EXTENDED RELEASE 24 HR ORAL 2 TIMES DAILY WITH MEALS
Qty: 360 TABLET | Refills: 1 | Status: SHIPPED | OUTPATIENT
Start: 2022-06-29 | End: 2023-01-26

## 2022-06-29 RX ORDER — LISINOPRIL 5 MG/1
5 TABLET ORAL DAILY
Qty: 30 TABLET | Refills: 3 | Status: SHIPPED | OUTPATIENT
Start: 2022-06-29 | End: 2022-12-07

## 2022-06-29 RX ORDER — IBUPROFEN 600 MG/1
600 TABLET, FILM COATED ORAL EVERY 6 HOURS PRN
Qty: 50 TABLET | Refills: 1 | Status: SHIPPED | OUTPATIENT
Start: 2022-06-29 | End: 2024-01-29

## 2022-06-29 NOTE — ASSESSMENT & PLAN NOTE
Invokana, metformin 1000 twice daily.  A1c 6.9.  On lisinopril, intolerant of statin.  Urinary frequency but likely due to BPH.  Up-to-date ophthalmology.

## 2022-06-29 NOTE — ASSESSMENT & PLAN NOTE
Discussed pros and cons of screening, decided to go forward, does have underlying BPH.  PSA ordered

## 2022-06-29 NOTE — PROGRESS NOTES
Assessment/ Plan  Type 2 diabetes mellitus (H)  Invokana, metformin 1000 twice daily.  A1c 6.9.  On lisinopril, intolerant of statin.  Urinary frequency but likely due to BPH.  Up-to-date ophthalmology.    Screening for prostate cancer  Discussed pros and cons of screening, decided to go forward, does have underlying BPH.  PSA ordered    Microalbuminuria  On lisinopril    Elevated blood pressure reading without diagnosis of hypertension  Blood pressure well controlled, lisinopril 5    Anemia, unspecified type  Previously borderline microcytic anemia.  Recheck today, excellent hemoglobin    Pain  Recommend that he mostly used Tylenol rather than nonsteroidal for pain.  Currently has a fracture in his foot.    Benign prostatic hyperplasia with urinary frequency  Diffusely enlarged prostate, check UA, start tamsulosin.     Subjective  CC:  chief complaint  HPI:  Complains of urinary urgency, up to 2 or 3 times at night, some increased frequency during the day but more that it is severe urgency.  Decreased stream.  New Invokana in addition to metformin for diabetes control  History of anemia.  Otherwise doing well  PFSH:  Patient Active Problem List   Diagnosis     Snoring     Type 2 diabetes mellitus (H)     Screening for prostate cancer     Pain in joint, ankle and foot, right     Elevated blood pressure reading without diagnosis of hypertension     Microalbuminuria     Anemia, unspecified type     Benign paroxysmal positional vertigo     Bilateral low back pain without sciatica     Blunt eye trauma     Category 1 low vision of right eye with category 2 low vision of left eye     Constipation     Disorder of bursae and tendons in shoulder region     Dyslipidemia     Erectile dysfunction, unspecified erectile dysfunction type     Frequent PVCs     Gastritis and gastroduodenitis     Hearing loss     Heartburn     Lateral epicondylitis of left elbow     Mitral valve regurgitation     Thrombocytopenia (H)     Tubular  adenoma of colon     Urinary urgency     Pain     Benign prostatic hyperplasia with urinary frequency     acetaminophen (TYLENOL) 325 MG suppository, Place 1 suppository (325 mg) rectally every 4 hours as needed for fever  brimonidine-timolol (COMBIGAN) 0.2-0.5 % ophthalmic solution, INSTILL ONE DROP INTO EACH EYE TWICE DAILY  brinzolamide (AZOPT) 1 % ophthalmic suspension, INSTILL ONE DROP INTO EACH EYE TWICE DAILY  dorzolamide (TRUSOPT) 2 % ophthalmic solution, instill 1 drop by ophthalmic route 2 times every day in the RIGHT eye only  metFORMIN (GLUCOPHAGE XR) 500 MG 24 hr tablet, Take 1,000 mg by mouth  prednisoLONE acetate (PRED FORTE) 1 % ophthalmic suspension, Instill 1 drop in LEFT eye 3 times a day until next seen  SM PAIN RELIEVER 325 MG tablet, TAKE TWO TABLETS BY MOUTH EVERY FOUR HOURS AS NEEDED FOR PAIN     No current facility-administered medications on file prior to visit.       History   Smoking Status     Never Smoker   Smokeless Tobacco     Never Used     Social History     Social History Narrative     Not on file     Patient Care Team:  Iron Reinoso MD as PCP - General (Family Medicine)  Dian Wiley MD as Assigned Sleep Provider  Iron Reinoso MD as Assigned PCP  Jenna Arrieta MD as Assigned Heart and Vascular Provider  ROS  As above      Objective  Physical Exam  Vitals:    06/29/22 1636   BP: 119/74   BP Location: Left arm   Patient Position: Sitting   Cuff Size: Adult Regular   Pulse: 61   Resp: 16   Temp: 97.5  F (36.4  C)   TempSrc: Temporal   SpO2: 100%   Weight: 73 kg (161 lb)     Body mass index is 26.79 kg/m .  Gen- alert, oriented/ appropriately responsive  HEENT- normal cephalic, atraumatic.   Chest- Normal inspiration and expiration.    Clear to ascultation.    No chest wall deformity or scar.  CV- Heart regular rate and rhythm  normal tones, no murmurs   No gallops or rubs.  Ext- appear well perfused, no edema  Skin- warm and dry,   no visualized rash  Prostate  diffusely enlarged  Diagnostics:   Results for orders placed or performed in visit on 06/29/22   Hemoglobin A1c     Status: Abnormal   Result Value Ref Range    Hemoglobin A1C 6.9 (H) 0.0 - 5.6 %   UA with Microscopic reflex to Culture - Clinic Collect     Status: Abnormal    Specimen: Urine, Midstream   Result Value Ref Range    Color Urine Yellow Colorless, Straw, Light Yellow, Yellow    Appearance Urine Clear Clear    Glucose Urine >=1000 (A) Negative mg/dL    Bilirubin Urine Negative Negative    Ketones Urine Negative Negative mg/dL    Specific Gravity Urine <=1.005 1.005 - 1.030    Blood Urine Negative Negative    pH Urine 6.0 5.0 - 8.0    Protein Albumin Urine Negative Negative mg/dL    Urobilinogen Urine 0.2 0.2, 1.0 E.U./dL    Nitrite Urine Negative Negative    Leukocyte Esterase Urine Negative Negative   Hemoglobin     Status: Normal   Result Value Ref Range    Hemoglobin 14.6 13.3 - 17.7 g/dL   Urine Microscopic     Status: Abnormal   Result Value Ref Range    Bacteria Urine Few (A) None Seen /HPF    RBC Urine None Seen 0-2 /HPF /HPF    WBC Urine None Seen 0-5 /HPF /HPF    Squamous Epithelials Urine Few (A) None Seen /LPF    Narrative    Urine Culture not indicated           Please note: Voice recognition software was used in this dictation.  It may therefore contain typographical errors.          .  ..  Answers for HPI/ROS submitted by the patient on 6/29/2022  What is the reason for your visit today? : follow up  How many servings of fruits and vegetables do you eat daily?: 2-3  On average, how many sweetened beverages do you drink each day (Examples: soda, juice, sweet tea, etc.  Do NOT count diet or artificially sweetened beverages)?: 0  How many minutes a day do you exercise enough to make your heart beat faster?: 9 or less  How many days a week do you exercise enough to make your heart beat faster?: 4  How many days per week do you miss taking your medication?: 0

## 2022-06-29 NOTE — ASSESSMENT & PLAN NOTE
Recommend that he mostly used Tylenol rather than nonsteroidal for pain.  Currently has a fracture in his foot.

## 2022-09-25 ENCOUNTER — HEALTH MAINTENANCE LETTER (OUTPATIENT)
Age: 57
End: 2022-09-25

## 2022-12-07 DIAGNOSIS — E11.9 TYPE 2 DIABETES MELLITUS WITHOUT COMPLICATION, WITHOUT LONG-TERM CURRENT USE OF INSULIN (H): Primary | ICD-10-CM

## 2022-12-07 DIAGNOSIS — R80.9 MICROALBUMINURIA: ICD-10-CM

## 2022-12-07 RX ORDER — LISINOPRIL 5 MG/1
5 TABLET ORAL DAILY
Qty: 30 TABLET | Refills: 7 | Status: SHIPPED | OUTPATIENT
Start: 2022-12-07 | End: 2023-01-26

## 2022-12-07 RX ORDER — METFORMIN HCL 500 MG
TABLET, EXTENDED RELEASE 24 HR ORAL
Qty: 360 TABLET | Refills: 0 | Status: SHIPPED | OUTPATIENT
Start: 2022-12-07 | End: 2023-12-04

## 2022-12-07 NOTE — TELEPHONE ENCOUNTER
"Routing refill request to provider for review/approval because:  Provider to review advisory notification        Metformin  Last Written Prescription Date:  6/29/22  Last Fill Quantity: 360,  # refills: 1   Last office visit provider:  6/29/22     Lisinopril  Last Written Prescription Date:  6/29/22  Last Fill Quantity: 30,  # refills: 3   Last office visit provider:  6/29/22    Requested Prescriptions   Pending Prescriptions Disp Refills     metFORMIN (GLUCOPHAGE XR) 500 MG 24 hr tablet [Pharmacy Med Name: metFORMIN HCl ER Oral Tablet Extended Release 24 Hour 500 MG] 360 tablet 0     Sig: TAKE 2 TABLETS (1,000 MG) BY MOUTH 2 TIMES DAILY (WITH MEALS).       Biguanide Agents Passed - 12/7/2022  1:49 PM        Passed - Patient is age 10 or older        Passed - Patient has documented A1c within the specified period of time.     If HgbA1C is 8 or greater, it needs to be on file within the past 3 months.  If less than 8, must be on file within the past 6 months.     Recent Labs   Lab Test 06/29/22  1616   A1C 6.9*             Passed - Patient's CR is NOT>1.4 OR Patient's EGFR is NOT<45 within past 12 mos.     Recent Labs   Lab Test 06/29/22  1616 11/03/21  1611 04/22/21  1409   GFRESTIMATED >90   < > >60   GFRESTBLACK  --   --  >60    < > = values in this interval not displayed.       Recent Labs   Lab Test 06/29/22  1616   CR 0.75             Passed - Patient does NOT have a diagnosis of CHF.        Passed - Medication is active on med list        Passed - Recent (6 mo) or future (30 days) visit within the authorizing provider's specialty     Patient had office visit in the last 6 months or has a visit in the next 30 days with authorizing provider or within the authorizing provider's specialty.  See \"Patient Info\" tab in inbasket, or \"Choose Columns\" in Meds & Orders section of the refill encounter.               lisinopril (ZESTRIL) 5 MG tablet [Pharmacy Med Name: Lisinopril Oral Tablet 5 MG] 30 tablet 7     Sig: Take " "1 tablet (5 mg) by mouth daily.       ACE Inhibitors (Including Combos) Protocol Passed - 12/7/2022  1:49 PM        Passed - Blood pressure under 140/90 in past 12 months     BP Readings from Last 3 Encounters:   06/29/22 119/74   03/09/22 (!) 141/78   01/21/22 125/68                 Passed - Recent (12 mo) or future (30 days) visit within the authorizing provider's specialty     Patient has had an office visit with the authorizing provider or a provider within the authorizing providers department within the previous 12 mos or has a future within next 30 days. See \"Patient Info\" tab in inbasket, or \"Choose Columns\" in Meds & Orders section of the refill encounter.              Passed - Medication is active on med list        Passed - Patient is age 18 or older        Passed - Normal serum creatinine on file in past 12 months     Recent Labs   Lab Test 06/29/22  1616   CR 0.75       Ok to refill medication if creatinine is low          Passed - Normal serum potassium on file in past 12 months     Recent Labs   Lab Test 06/29/22  1616   POTASSIUM 4.5                  JOELLE LAGUERRE RN 12/07/22 2:05 PM  "

## 2023-01-26 ENCOUNTER — OFFICE VISIT (OUTPATIENT)
Dept: FAMILY MEDICINE | Facility: CLINIC | Age: 58
End: 2023-01-26
Payer: MEDICAID

## 2023-01-26 VITALS
TEMPERATURE: 97 F | HEART RATE: 65 BPM | BODY MASS INDEX: 25.66 KG/M2 | HEIGHT: 65 IN | DIASTOLIC BLOOD PRESSURE: 77 MMHG | RESPIRATION RATE: 18 BRPM | WEIGHT: 154 LBS | SYSTOLIC BLOOD PRESSURE: 123 MMHG | OXYGEN SATURATION: 98 %

## 2023-01-26 DIAGNOSIS — D69.6 THROMBOCYTOPENIA (H): ICD-10-CM

## 2023-01-26 DIAGNOSIS — R80.9 MICROALBUMINURIA: ICD-10-CM

## 2023-01-26 DIAGNOSIS — Z00.00 HEALTHCARE MAINTENANCE: ICD-10-CM

## 2023-01-26 DIAGNOSIS — R03.0 ELEVATED BLOOD PRESSURE READING WITHOUT DIAGNOSIS OF HYPERTENSION: ICD-10-CM

## 2023-01-26 DIAGNOSIS — E11.9 TYPE 2 DIABETES MELLITUS WITHOUT COMPLICATION, WITHOUT LONG-TERM CURRENT USE OF INSULIN (H): Primary | ICD-10-CM

## 2023-01-26 DIAGNOSIS — Z13.220 SCREENING FOR HYPERLIPIDEMIA: ICD-10-CM

## 2023-01-26 LAB
ANION GAP SERPL CALCULATED.3IONS-SCNC: 12 MMOL/L (ref 7–15)
BUN SERPL-MCNC: 9.8 MG/DL (ref 6–20)
CALCIUM SERPL-MCNC: 10 MG/DL (ref 8.6–10)
CHLORIDE SERPL-SCNC: 100 MMOL/L (ref 98–107)
CREAT SERPL-MCNC: 0.95 MG/DL (ref 0.67–1.17)
CREAT UR-MCNC: 122 MG/DL
DEPRECATED HCO3 PLAS-SCNC: 26 MMOL/L (ref 22–29)
GFR SERPL CREATININE-BSD FRML MDRD: >90 ML/MIN/1.73M2
GLUCOSE SERPL-MCNC: 107 MG/DL (ref 70–99)
HBA1C MFR BLD: 7.4 % (ref 0–5.6)
MICROALBUMIN UR-MCNC: <12 MG/L
MICROALBUMIN/CREAT UR: NORMAL MG/G{CREAT}
POTASSIUM SERPL-SCNC: 4.3 MMOL/L (ref 3.4–5.3)
SODIUM SERPL-SCNC: 138 MMOL/L (ref 136–145)

## 2023-01-26 PROCEDURE — 91313 COVID-19 VACCINE BIVALENT BOOSTER 18+ (MODERNA): CPT | Performed by: FAMILY MEDICINE

## 2023-01-26 PROCEDURE — 90677 PCV20 VACCINE IM: CPT | Performed by: FAMILY MEDICINE

## 2023-01-26 PROCEDURE — 0134A COVID-19 VACCINE BIVALENT BOOSTER 18+ (MODERNA): CPT | Performed by: FAMILY MEDICINE

## 2023-01-26 PROCEDURE — 82043 UR ALBUMIN QUANTITATIVE: CPT | Performed by: FAMILY MEDICINE

## 2023-01-26 PROCEDURE — 36415 COLL VENOUS BLD VENIPUNCTURE: CPT | Performed by: FAMILY MEDICINE

## 2023-01-26 PROCEDURE — 80048 BASIC METABOLIC PNL TOTAL CA: CPT | Performed by: FAMILY MEDICINE

## 2023-01-26 PROCEDURE — 90471 IMMUNIZATION ADMIN: CPT | Performed by: FAMILY MEDICINE

## 2023-01-26 PROCEDURE — 83036 HEMOGLOBIN GLYCOSYLATED A1C: CPT | Performed by: FAMILY MEDICINE

## 2023-01-26 PROCEDURE — 82570 ASSAY OF URINE CREATININE: CPT | Performed by: FAMILY MEDICINE

## 2023-01-26 PROCEDURE — 99214 OFFICE O/P EST MOD 30 MIN: CPT | Mod: 25 | Performed by: FAMILY MEDICINE

## 2023-01-26 RX ORDER — LISINOPRIL 5 MG/1
5 TABLET ORAL DAILY
Qty: 30 TABLET | Refills: 7 | Status: SHIPPED | OUTPATIENT
Start: 2023-01-26 | End: 2023-09-26

## 2023-01-26 RX ORDER — METFORMIN HCL 500 MG
1000 TABLET, EXTENDED RELEASE 24 HR ORAL 2 TIMES DAILY WITH MEALS
Qty: 360 TABLET | Refills: 0 | Status: CANCELLED | OUTPATIENT
Start: 2023-01-26

## 2023-01-26 RX ORDER — METFORMIN HCL 500 MG
1000 TABLET, EXTENDED RELEASE 24 HR ORAL 2 TIMES DAILY WITH MEALS
Qty: 360 TABLET | Refills: 1 | Status: SHIPPED | OUTPATIENT
Start: 2023-01-26 | End: 2023-08-28

## 2023-01-26 NOTE — PROGRESS NOTES
Assessment/ Plan  A total of 35 minutes was spent on this visit reviewing previous notes, counseling patient, ordering tests , adjusting meds (see below) and documenting the findings in this note      Type 2 diabetes mellitus (H)  A1c 7.4.  Continue Invokana 100, metformin 1000 twice daily in addition to lisinopril 5-he is intolerant of statin medications.  No changes, follow-up 6-month    Microalbuminuria  Lisinopril and Invokana, blood pressure well controlled.    Healthcare maintenance  COVID and Pneumovax given today     Subjective  CC:  chief complaint  HPI:  Follow-up diabete extensive discussion about recent PSA test which was normal  Discussed safety of use of Invokana and lisinopril and kidneys.  Discussed that this was to protect his kidney    PFSH:  Patient Active Problem List   Diagnosis     Snoring     Type 2 diabetes mellitus (H)     Healthcare maintenance     Pain in joint, ankle and foot, right     Elevated blood pressure reading without diagnosis of hypertension     Microalbuminuria     Anemia, unspecified type     Benign paroxysmal positional vertigo     Bilateral low back pain without sciatica     Blunt eye trauma     Category 1 low vision of right eye with category 2 low vision of left eye     Constipation     Disorder of bursae and tendons in shoulder region     Dyslipidemia     Erectile dysfunction, unspecified erectile dysfunction type     Frequent PVCs     Gastritis and gastroduodenitis     Hearing loss     Heartburn     Lateral epicondylitis of left elbow     Mitral valve regurgitation     Tubular adenoma of colon     Urinary urgency     Pain     Benign prostatic hyperplasia with urinary frequency     acetaminophen (TYLENOL) 325 MG suppository, Place 1 suppository (325 mg) rectally every 4 hours as needed for fever  acetaminophen (TYLENOL) 500 MG tablet, Take 1-2 tablets (500-1,000 mg) by mouth every 6 hours as needed for mild pain  brimonidine-timolol (COMBIGAN) 0.2-0.5 % ophthalmic  "solution, INSTILL ONE DROP INTO EACH EYE TWICE DAILY  brinzolamide (AZOPT) 1 % ophthalmic suspension, INSTILL ONE DROP INTO EACH EYE TWICE DAILY  dorzolamide (TRUSOPT) 2 % ophthalmic solution, instill 1 drop by ophthalmic route 2 times every day in the RIGHT eye only  ibuprofen (ADVIL/MOTRIN) 600 MG tablet, Take 1 tablet (600 mg) by mouth every 6 hours as needed for moderate pain  metFORMIN (GLUCOPHAGE XR) 500 MG 24 hr tablet, TAKE 2 TABLETS (1,000 MG) BY MOUTH 2 TIMES DAILY (WITH MEALS).  metFORMIN (GLUCOPHAGE XR) 500 MG 24 hr tablet, Take 1,000 mg by mouth  prednisoLONE acetate (PRED FORTE) 1 % ophthalmic suspension, Instill 1 drop in LEFT eye 3 times a day until next seen  SM PAIN RELIEVER 325 MG tablet, TAKE TWO TABLETS BY MOUTH EVERY FOUR HOURS AS NEEDED FOR PAIN   ferrous gluconate (FERGON) 324 (38 Fe) MG tablet, Take 1 tablet (324 mg) by mouth daily (with breakfast) (Patient not taking: Reported on 1/26/2023)  tamsulosin (FLOMAX) 0.4 MG capsule, Take 1 capsule (0.4 mg) by mouth daily (Patient not taking: Reported on 1/26/2023)    No current facility-administered medications on file prior to visit.       History   Smoking Status     Never   Smokeless Tobacco     Never     Social History     Social History Narrative     Not on file     Patient Care Team:  Iron Reinoso MD as PCP - General (Family Medicine)  Iron Reinoso MD as Assigned PCP  ROS  As      Objective  Physical Exam  Vitals:    01/26/23 1347   BP: 123/77   BP Location: Right arm   Patient Position: Sitting   Cuff Size: Adult Regular   Pulse: 65   Resp: 18   Temp: 97  F (36.1  C)   TempSrc: Temporal   SpO2: 98%   Weight: 69.9 kg (154 lb)   Height: 1.655 m (5' 5.16\")     Body mass index is 25.5 kg/m .  Gen- alert, oriented/ appropriately responsive  HEENT- normal cephalic, atraumatic.   Chest- Normal inspiration and expiration.    Clear to ascultation.    No chest wall deformity or scar.  CV- Heart regular rate and rhythm  normal tones, no murmurs "   No gallops or rubs.  Ext- appear well perfused, no edema  Skin- warm and dry,   no visualized rash    Diagnostics:   Results for orders placed or performed in visit on 01/26/23   Hemoglobin A1c     Status: Abnormal   Result Value Ref Range    Hemoglobin A1C 7.4 (H) 0.0 - 5.6 %           Please note: Voice recognition software was used in this dictation.  It may therefore contain typographical errors.          Answers for HPI/ROS submitted by the patient on 1/26/2023  What is the reason for your visit today? : follow up on diabetes  How many servings of fruits and vegetables do you eat daily?: 0-1  On average, how many sweetened beverages do you drink each day (Examples: soda, juice, sweet tea, etc.  Do NOT count diet or artificially sweetened beverages)?: 0  How many minutes a day do you exercise enough to make your heart beat faster?: 10 to 19  How many days a week do you exercise enough to make your heart beat faster?: 7  How many days per week do you miss taking your medication?: 0

## 2023-01-27 NOTE — ASSESSMENT & PLAN NOTE
A1c 7.4.  Continue Invokana 100, metformin 1000 twice daily in addition to lisinopril 5-he is intolerant of statin medications.  No changes, follow-up 6-month

## 2023-01-30 ENCOUNTER — HEALTH MAINTENANCE LETTER (OUTPATIENT)
Age: 58
End: 2023-01-30

## 2023-02-07 ENCOUNTER — TRANSFERRED RECORDS (OUTPATIENT)
Dept: HEALTH INFORMATION MANAGEMENT | Facility: CLINIC | Age: 58
End: 2023-02-07

## 2023-02-07 ENCOUNTER — LAB (OUTPATIENT)
Dept: LAB | Facility: CLINIC | Age: 58
End: 2023-02-07
Payer: MEDICAID

## 2023-02-07 DIAGNOSIS — H50.9 STRABISMUS: ICD-10-CM

## 2023-02-07 DIAGNOSIS — H02.409 PTOSIS: Primary | ICD-10-CM

## 2023-02-07 LAB — RETINOPATHY: POSITIVE

## 2023-02-07 PROCEDURE — 36415 COLL VENOUS BLD VENIPUNCTURE: CPT | Performed by: FAMILY MEDICINE

## 2023-02-07 PROCEDURE — 83519 RIA NONANTIBODY: CPT | Mod: 90 | Performed by: FAMILY MEDICINE

## 2023-02-07 PROCEDURE — 99000 SPECIMEN HANDLING OFFICE-LAB: CPT | Performed by: FAMILY MEDICINE

## 2023-02-07 PROCEDURE — 83516 IMMUNOASSAY NONANTIBODY: CPT | Mod: 90 | Performed by: FAMILY MEDICINE

## 2023-02-09 ENCOUNTER — TRANSCRIBE ORDERS (OUTPATIENT)
Dept: OTHER | Age: 58
End: 2023-02-09

## 2023-02-09 ENCOUNTER — TELEPHONE (OUTPATIENT)
Dept: OPHTHALMOLOGY | Facility: CLINIC | Age: 58
End: 2023-02-09

## 2023-02-09 DIAGNOSIS — Z96.1 PSEUDOPHAKIA: Primary | ICD-10-CM

## 2023-02-09 LAB — ACHR BLOCK AB/ACHR TOTAL SFR SER: 19 %

## 2023-02-09 NOTE — TELEPHONE ENCOUNTER
Reviewed by Neuro-Ophthalmology team and ok for first available with Dr. Morales    Note to patient communicator to assist in scheduling    Ruben Acosta RN 5:14 PM 02/09/23            [facility] Call Center    Phone Message    May a detailed message be left on voicemail: yes     Reason for Call: Appointment Intake    Referring Provider Name: Referred by: Dr Marshall Kamara @ [facility]  Phone: [phone], Fax: [phone]  Diagnosis and/or Symptoms: Pseudophakia, neuro-ophth-pt inquired about intermittent color washout. Discussed Quintin Elise Synd, Expl w/such limited vision OS, brain may be producing false interpretations ,Dr Morales     Please review      Action Taken: Message routed to:  [facility] Eye    Travel Screening: Not Applicable

## 2023-02-10 ENCOUNTER — TELEPHONE (OUTPATIENT)
Dept: OPHTHALMOLOGY | Facility: CLINIC | Age: 58
End: 2023-02-10

## 2023-02-10 ENCOUNTER — TELEPHONE (OUTPATIENT)
Dept: OPHTHALMOLOGY | Facility: CLINIC | Age: 58
End: 2023-02-10
Payer: MEDICAID

## 2023-02-10 LAB
ACHR BIND AB SER-SCNC: 0.1 NMOL/L
ACHR MOD AB/ACHR TOTAL SFR SER: 3 %

## 2023-02-10 NOTE — TELEPHONE ENCOUNTER
Reviewed by Neuro-Ophthalmology team and ok for first available with Dr. Andrew Fry and SHERRI Hartley Communication Facilitator on 2/10/2023 at 10:50 AM

## 2023-02-11 LAB — MUSK AB SER-SCNC: 0.01 NMOL/L

## 2023-04-04 ENCOUNTER — TELEPHONE (OUTPATIENT)
Dept: FAMILY MEDICINE | Facility: CLINIC | Age: 58
End: 2023-04-04

## 2023-04-04 NOTE — TELEPHONE ENCOUNTER
Called and left voicemail for Kurtis to schedule travel consultation [facility] in [place].  Patient can schedule by calling [facility] travel line at [phone] or [facility] line [phone].   Travel provider is YADIRA Barroso CNP.   Appointments are 30 minutes in length for one patient.   Can schedule a family of four to be seen all at the same time - 30 mins for each patient for a total of 120 minute visit (need enough time blocked for 120 minutes).  Travel appointments are in-person only.    Travel clinic is open Mondays, Wednesdays, and Thursdays.     Ashley ROSEN

## 2023-05-21 NOTE — TELEPHONE ENCOUNTER
"Reason for Disposition   Ulcer or sore seen on the cornea (clear center part of the eye)    Additional Information   Negative: Followed an eye injury   Negative: Eye pain from chemical in the eye   Negative: Eye pain from foreign body in eye   Negative: [1] Tender, red lump or pimple AND [2] located along the eyelid margin   Negative: Has sinus pain or pressure   Negative: Severe eye pain   Negative: Complete loss of vision in one or both eyes   Negative: [1] Eyelids are very swollen (shut or almost) AND [2] fever   Negative: [1] Eyelid (outer) is very red AND [2] fever   Negative: [1] Foreign body sensation ("feels like something is in there") AND [2] irrigation didn't help   Negative: Vomiting    Protocols used: Eye Pain-A-AH  Pt states he went to Urgent Care on 5/9/23 for right eye pain and was prescribed eye drops to apply to the right eye once daily for eight days.     States later the same day he was able to get an appointment with the eye specialist Dr. Rodriguez. States he was diagnosed with Keratitis. States Dr. Bartholomew told him to apply the drops he received at Urgent Care to both of his eyes 4 times a day, but did not give him another prescription.     Pt states the drops ran out of eye drops because he only had enough to apply to one eye, and he has been without the medication since 5/17/23.    Pt states his right eye is now worse and has a red "splotch" on it. States he also has pressure in the eye. Pt advised to go to the ED per triage protocol. Pt refused and stated it will cost him $500 and they cannot do anything for his eye. Pt wants to see the eye specialist or get more medication for his eye.    Call placed to Provider On Call Dr. Vargas and left a voicemail. This secure chat message was sent to the Provider. Provider states he'll contact me when he ends a call he is on with another patient. Pt advised I will call him back after speaking with the Provider.    Spoke with Provider on call and he " I Should see him in May and he should continue his metformin at the same dose   states he'll refill the medication, but the pt has to be seen in the clinic tomorrow.     Pt advised this message will go to Dr. Rodriguez's staff to contact him when the office opens tomorrow for an appointment.     Instructed to call OOC back if symptoms worsen. Pt requested the medication to be sent to [facility] at [address] [place] [phone].    Notified Dr. Bolanos of the pharmacy pt requested.

## 2023-07-31 DIAGNOSIS — R52 PAIN: ICD-10-CM

## 2023-07-31 DIAGNOSIS — E11.9 TYPE 2 DIABETES MELLITUS WITHOUT COMPLICATION, WITHOUT LONG-TERM CURRENT USE OF INSULIN (H): Primary | ICD-10-CM

## 2023-08-01 RX ORDER — METFORMIN HCL 500 MG
TABLET, EXTENDED RELEASE 24 HR ORAL
Qty: 360 TABLET | Refills: 0 | Status: SHIPPED | OUTPATIENT
Start: 2023-08-01 | End: 2023-08-28

## 2023-08-01 RX ORDER — PSEUDOEPHED/ACETAMINOPH/DIPHEN 30MG-500MG
TABLET ORAL
Qty: 100 TABLET | Refills: 3 | Status: SHIPPED | OUTPATIENT
Start: 2023-08-01 | End: 2024-03-28

## 2023-08-01 NOTE — TELEPHONE ENCOUNTER
"Last Written Prescription Date:  12/07/2022  Last Fill Quantity: 360,  # refills: 0   Last office visit provider:  1/26/2023     Requested Prescriptions   Pending Prescriptions Disp Refills    metFORMIN (GLUCOPHAGE XR) 500 MG 24 hr tablet [Pharmacy Med Name: metFORMIN HCl ER Oral Tablet Extended Release 24 Hour 500 MG] 360 tablet 0     Sig: TAKE 2 TABLETS (1,000 MG) BY MOUTH 2 TIMES DAILY (WITH MEALS)       Biguanide Agents Failed - 8/1/2023  1:22 PM        Failed - Patient has documented A1c within the specified period of time.     If HgbA1C is 8 or greater, it needs to be on file within the past 3 months.  If less than 8, must be on file within the past 6 months.     Recent Labs   Lab Test 01/26/23  1405   A1C 7.4*             Failed - Recent (6 mo) or future (30 days) visit within the authorizing provider's specialty     Patient had office visit in the last 6 months or has a visit in the next 30 days with authorizing provider or within the authorizing provider's specialty.  See \"Patient Info\" tab in inbasket, or \"Choose Columns\" in Meds & Orders section of the refill encounter.            Passed - Patient is age 10 or older        Passed - Patient's CR is NOT>1.4 OR Patient's EGFR is NOT<45 within past 12 mos.     Recent Labs   Lab Test 01/26/23  1405 11/03/21  1611 04/22/21  1409   GFRESTIMATED >90   < > >60   GFRESTBLACK  --   --  >60    < > = values in this interval not displayed.       Recent Labs   Lab Test 01/26/23  1405   CR 0.95             Passed - Patient does NOT have a diagnosis of CHF.        Passed - Medication is active on med list      Last Written Prescription Date:  6/29/2022  Last Fill Quantity: 100,  # refills: 3  Last office visit provider:  1/26/2023     ACETAMINOPHEN EXTRA STRENGTH 500 MG tablet [Pharmacy Med Name: Acetaminophen Extra Strength Oral Tablet 500 MG] 100 tablet 0     Sig: Take 1-2 tablets (500-1,000 mg) by mouth every 6 hours as needed for mild pain.       Analgesics " "(Non-Narcotic Tylenol and ASA Only) Passed - 8/1/2023  1:22 PM        Passed - Recent (12 mo) or future (30 days) visit within the authorizing provider's specialty     Patient has had an office visit with the authorizing provider or a provider within the authorizing providers department within the previous 12 mos or has a future within next 30 days. See \"Patient Info\" tab in inbasket, or \"Choose Columns\" in Meds & Orders section of the refill encounter.              Passed - Patient is 7 months old or older     If patient is a peds patient of the age 7 mos -12 years, ok to refill using weight-based dosing.     If >3g daily and/or sig is not \"prn\", check for liver enzymes. If normal in the last year, ok to refill.  If not, refer to the provider.          Passed - Medication is active on med list             Jessenia Chakraborty RN 08/01/23 1:22 PM  "

## 2023-08-01 NOTE — TELEPHONE ENCOUNTER
"Routing refill request to provider for review/approval because:  Labs not current:  1/26/2026 A1C  Patient needs to be seen because:  needs to be seen  every 6 months    LLast Written Prescription Date:  12/07/2022  Last Fill Quantity: 360,  # refills: 0   Last office visit provider:  1/26/2023     Requested Prescriptions   Pending Prescriptions Disp Refills    metFORMIN (GLUCOPHAGE XR) 500 MG 24 hr tablet [Pharmacy Med Name: metFORMIN HCl ER Oral Tablet Extended Release 24 Hour 500 MG] 360 tablet 0     Sig: TAKE 2 TABLETS (1,000 MG) BY MOUTH 2 TIMES DAILY (WITH MEALS)       Biguanide Agents Failed - 8/1/2023  1:22 PM        Failed - Patient has documented A1c within the specified period of time.     If HgbA1C is 8 or greater, it needs to be on file within the past 3 months.  If less than 8, must be on file within the past 6 months.     Recent Labs   Lab Test 01/26/23  1405   A1C 7.4*             Failed - Recent (6 mo) or future (30 days) visit within the authorizing provider's specialty     Patient had office visit in the last 6 months or has a visit in the next 30 days with authorizing provider or within the authorizing provider's specialty.  See \"Patient Info\" tab in inbasket, or \"Choose Columns\" in Meds & Orders section of the refill encounter.            Passed - Patient is age 10 or older        Passed - Patient's CR is NOT>1.4 OR Patient's EGFR is NOT<45 within past 12 mos.     Recent Labs   Lab Test 01/26/23  1405 11/03/21  1611 04/22/21  1409   GFRESTIMATED >90   < > >60   GFRESTBLACK  --   --  >60    < > = values in this interval not displayed.       Recent Labs   Lab Test 01/26/23  1405   CR 0.95             Passed - Patient does NOT have a diagnosis of CHF.        Passed - Medication is active on med list         Signed Prescriptions Disp Refills    acetaminophen (ACETAMINOPHEN EXTRA STRENGTH) 500 MG tablet 100 tablet 3     Sig: Take 1-2 tablets (500-1,000 mg) by mouth every 6 hours as needed for mild " "pain.       Analgesics (Non-Narcotic Tylenol and ASA Only) Passed - 8/1/2023  1:22 PM        Passed - Recent (12 mo) or future (30 days) visit within the authorizing provider's specialty     Patient has had an office visit with the authorizing provider or a provider within the authorizing providers department within the previous 12 mos or has a future within next 30 days. See \"Patient Info\" tab in inbasket, or \"Choose Columns\" in Meds & Orders section of the refill encounter.              Passed - Patient is 7 months old or older     If patient is a peds patient of the age 7 mos -12 years, ok to refill using weight-based dosing.     If >3g daily and/or sig is not \"prn\", check for liver enzymes. If normal in the last year, ok to refill.  If not, refer to the provider.          Passed - Medication is active on med list             Jessenia Chakraborty RN 08/01/23 1:27 PM  "

## 2023-08-05 ENCOUNTER — HEALTH MAINTENANCE LETTER (OUTPATIENT)
Age: 58
End: 2023-08-05

## 2023-08-28 ENCOUNTER — OFFICE VISIT (OUTPATIENT)
Dept: FAMILY MEDICINE | Facility: CLINIC | Age: 58
End: 2023-08-28
Payer: MEDICAID

## 2023-08-28 VITALS
WEIGHT: 161 LBS | RESPIRATION RATE: 16 BRPM | BODY MASS INDEX: 26.82 KG/M2 | DIASTOLIC BLOOD PRESSURE: 76 MMHG | OXYGEN SATURATION: 99 % | SYSTOLIC BLOOD PRESSURE: 151 MMHG | HEIGHT: 65 IN | TEMPERATURE: 98 F | HEART RATE: 70 BPM

## 2023-08-28 DIAGNOSIS — R03.0 ELEVATED BLOOD PRESSURE READING WITHOUT DIAGNOSIS OF HYPERTENSION: ICD-10-CM

## 2023-08-28 DIAGNOSIS — Z00.00 HEALTHCARE MAINTENANCE: ICD-10-CM

## 2023-08-28 DIAGNOSIS — E78.5 DYSLIPIDEMIA: ICD-10-CM

## 2023-08-28 DIAGNOSIS — R30.0 DYSURIA: ICD-10-CM

## 2023-08-28 DIAGNOSIS — E11.9 TYPE 2 DIABETES MELLITUS WITHOUT COMPLICATION, WITHOUT LONG-TERM CURRENT USE OF INSULIN (H): Primary | ICD-10-CM

## 2023-08-28 DIAGNOSIS — R80.9 MICROALBUMINURIA: ICD-10-CM

## 2023-08-28 PROBLEM — H40.1133 PRIMARY OPEN-ANGLE GLAUCOMA, BILATERAL, SEVERE STAGE: Status: ACTIVE | Noted: 2023-01-12

## 2023-08-28 PROBLEM — H50.10 MONOCULAR EXOTROPIA: Status: ACTIVE | Noted: 2023-01-17

## 2023-08-28 PROBLEM — H54.8 LEGAL BLINDNESS: Status: ACTIVE | Noted: 2023-01-12

## 2023-08-28 LAB
ALBUMIN UR-MCNC: NEGATIVE MG/DL
ALT SERPL W P-5'-P-CCNC: 41 U/L (ref 0–70)
ANION GAP SERPL CALCULATED.3IONS-SCNC: 10 MMOL/L (ref 7–15)
APPEARANCE UR: CLEAR
BACTERIA #/AREA URNS HPF: ABNORMAL /HPF
BILIRUB UR QL STRIP: NEGATIVE
BUN SERPL-MCNC: 19.8 MG/DL (ref 6–20)
CALCIUM SERPL-MCNC: 9.3 MG/DL (ref 8.6–10)
CHLORIDE SERPL-SCNC: 103 MMOL/L (ref 98–107)
CHOLEST SERPL-MCNC: 178 MG/DL
COLOR UR AUTO: YELLOW
CREAT SERPL-MCNC: 1 MG/DL (ref 0.67–1.17)
CREAT UR-MCNC: 133 MG/DL
DEPRECATED HCO3 PLAS-SCNC: 28 MMOL/L (ref 22–29)
GFR SERPL CREATININE-BSD FRML MDRD: 88 ML/MIN/1.73M2
GLUCOSE SERPL-MCNC: 119 MG/DL (ref 70–99)
GLUCOSE UR STRIP-MCNC: >=1000 MG/DL
HBA1C MFR BLD: 7.4 % (ref 0–5.6)
HDLC SERPL-MCNC: 33 MG/DL
HGB UR QL STRIP: NEGATIVE
KETONES UR STRIP-MCNC: 15 MG/DL
LDLC SERPL CALC-MCNC: 102 MG/DL
LEUKOCYTE ESTERASE UR QL STRIP: NEGATIVE
MICROALBUMIN UR-MCNC: <12 MG/L
MICROALBUMIN/CREAT UR: NORMAL MG/G{CREAT}
NITRATE UR QL: NEGATIVE
NONHDLC SERPL-MCNC: 145 MG/DL
PH UR STRIP: 5 [PH] (ref 5–8)
POTASSIUM SERPL-SCNC: 4 MMOL/L (ref 3.4–5.3)
RBC #/AREA URNS AUTO: ABNORMAL /HPF
SODIUM SERPL-SCNC: 141 MMOL/L (ref 136–145)
SP GR UR STRIP: 1.02 (ref 1–1.03)
T VAGINALIS DNA SPEC QL NAA+PROBE: NOT DETECTED
TRIGL SERPL-MCNC: 216 MG/DL
UROBILINOGEN UR STRIP-ACNC: 0.2 E.U./DL
WBC #/AREA URNS AUTO: ABNORMAL /HPF

## 2023-08-28 PROCEDURE — 87661 TRICHOMONAS VAGINALIS AMPLIF: CPT | Performed by: FAMILY MEDICINE

## 2023-08-28 PROCEDURE — 87389 HIV-1 AG W/HIV-1&-2 AB AG IA: CPT | Performed by: FAMILY MEDICINE

## 2023-08-28 PROCEDURE — 36415 COLL VENOUS BLD VENIPUNCTURE: CPT | Performed by: FAMILY MEDICINE

## 2023-08-28 PROCEDURE — 99207 PR FOOT EXAM NO CHARGE: CPT | Performed by: FAMILY MEDICINE

## 2023-08-28 PROCEDURE — 82570 ASSAY OF URINE CREATININE: CPT | Performed by: FAMILY MEDICINE

## 2023-08-28 PROCEDURE — 84460 ALANINE AMINO (ALT) (SGPT): CPT | Performed by: FAMILY MEDICINE

## 2023-08-28 PROCEDURE — 99214 OFFICE O/P EST MOD 30 MIN: CPT | Performed by: FAMILY MEDICINE

## 2023-08-28 PROCEDURE — 80048 BASIC METABOLIC PNL TOTAL CA: CPT | Performed by: FAMILY MEDICINE

## 2023-08-28 PROCEDURE — 87591 N.GONORRHOEAE DNA AMP PROB: CPT | Performed by: FAMILY MEDICINE

## 2023-08-28 PROCEDURE — 81001 URINALYSIS AUTO W/SCOPE: CPT | Performed by: FAMILY MEDICINE

## 2023-08-28 PROCEDURE — 82043 UR ALBUMIN QUANTITATIVE: CPT | Performed by: FAMILY MEDICINE

## 2023-08-28 PROCEDURE — 87491 CHLMYD TRACH DNA AMP PROBE: CPT | Performed by: FAMILY MEDICINE

## 2023-08-28 PROCEDURE — 80061 LIPID PANEL: CPT | Performed by: FAMILY MEDICINE

## 2023-08-28 PROCEDURE — 83036 HEMOGLOBIN GLYCOSYLATED A1C: CPT | Performed by: FAMILY MEDICINE

## 2023-08-28 RX ORDER — LOSARTAN POTASSIUM 50 MG/1
50 TABLET ORAL DAILY
COMMUNITY
Start: 2023-07-12 | End: 2023-12-27

## 2023-08-28 RX ORDER — FLUTICASONE PROPIONATE AND SALMETEROL 50; 500 UG/1; UG/1
1 POWDER RESPIRATORY (INHALATION) 2 TIMES DAILY
COMMUNITY
Start: 2023-08-18 | End: 2024-03-28

## 2023-08-28 ASSESSMENT — PAIN SCALES - GENERAL: PAINLEVEL: NO PAIN (0)

## 2023-08-28 NOTE — ASSESSMENT & PLAN NOTE
A1c 7.4, stable.  Will increase Invokana from 100-300, continue metformin.  Also on statin medication, up-to-date with ophthalmology.  Blood pressure is elevated here but good at home.  On low-dose lisinopril 5 mg.  No changes

## 2023-08-28 NOTE — PROGRESS NOTES
Assessment/ Plan  Type 2 diabetes mellitus (H)  A1c 7.4, stable.  Will increase Invokana from 100-300, continue metformin.  Also on statin medication, up-to-date with ophthalmology.  Blood pressure is elevated here but good at home.  On low-dose lisinopril 5 mg.  No changes    Healthcare maintenance  Up-to-date vaccines except Shingrix.    Elevated blood pressure reading without diagnosis of hypertension  Microalbumin, blood pressure elevated here but good at home.  Asked him to bring in his home blood pressure cuff.    Microalbuminuria  Lisinopril 5 mg.    Dyslipidemia  Patient is intolerant of statin medications.  Lipid panel ordered today.    Dysuria  Indicates that his wife recently took a trip to [place] and came back and had intercourse he has some burning with urination.  No discharge.  Has generally trusted his partner in the past.  Does have some increased urinary frequency.  UA is negative.  Await trichomonas, gonorrhea chlamydia.  HIV also tested.   Subjective  CC:  chief complaint  HPI:  Discussion as above.  Here to follow-up on diabetes.  PFSH:  Patient Active Problem List   Diagnosis    Snoring    Type 2 diabetes mellitus (H)    Healthcare maintenance    Pain in joint, ankle and foot, right    Elevated blood pressure reading without diagnosis of hypertension    Microalbuminuria    Anemia, unspecified type    Benign paroxysmal positional vertigo    Bilateral low back pain without sciatica    Blunt eye trauma    Category 1 low vision of right eye with category 2 low vision of left eye    Constipation    Disorder of bursae and tendons in shoulder region    Dyslipidemia    Erectile dysfunction, unspecified erectile dysfunction type    Frequent PVCs    Gastritis and gastroduodenitis    Hearing loss    Heartburn    Lateral epicondylitis of left elbow    Mitral valve regurgitation    Tubular adenoma of colon    Urinary urgency    Pain    Benign prostatic hyperplasia with urinary frequency    Legal blindness  "   Monocular exotropia    Primary open-angle glaucoma, bilateral, severe stage    Dysuria     acetaminophen (ACETAMINOPHEN EXTRA STRENGTH) 500 MG tablet, Take 1-2 tablets (500-1,000 mg) by mouth every 6 hours as needed for mild pain.  ADVAIR DISKUS 500-50 MCG/ACT inhaler, Inhale 1 puff into the lungs 2 times daily  brimonidine-timolol (COMBIGAN) 0.2-0.5 % ophthalmic solution, INSTILL ONE DROP INTO EACH EYE TWICE DAILY  brinzolamide (AZOPT) 1 % ophthalmic suspension, INSTILL ONE DROP INTO EACH EYE TWICE DAILY  dorzolamide (TRUSOPT) 2 % ophthalmic solution, instill 1 drop by ophthalmic route 2 times every day in the RIGHT eye only  ibuprofen (ADVIL/MOTRIN) 600 MG tablet, Take 1 tablet (600 mg) by mouth every 6 hours as needed for moderate pain  lisinopril (ZESTRIL) 5 MG tablet, Take 1 tablet (5 mg) by mouth daily  losartan (COZAAR) 50 MG tablet, Take 50 mg by mouth daily  metFORMIN (GLUCOPHAGE XR) 500 MG 24 hr tablet, TAKE 2 TABLETS (1,000 MG) BY MOUTH 2 TIMES DAILY (WITH MEALS).  prednisoLONE acetate (PRED FORTE) 1 % ophthalmic suspension, Instill 1 drop in LEFT eye 3 times a day until next seen    No current facility-administered medications on file prior to visit.       History   Smoking Status    Never   Smokeless Tobacco    Never     Social History     Social History Narrative    Not on file     Patient Care Team:  Iron Reinoso MD as PCP - General (Family Medicine)  Iron Reinoso MD as Assigned PCP        Objective  Physical Exam  Vitals:    08/28/23 1641 08/28/23 1647   BP: (!) 153/74 (!) 151/76   BP Location: Right arm    Patient Position: Sitting    Cuff Size: Adult Regular    Pulse: 70    Resp: 16    Temp: 98  F (36.7  C)    TempSrc: Oral    SpO2: 99%    Weight: 73 kg (161 lb)    Height: 1.659 m (5' 5.32\")      Body mass index is 26.53 kg/m .  Gen- alert, oriented x3  No acute distress  Chest- Normal inspiration and expiration.    Clear to ascultation.    No chest wall deformity or scar.  CV- Heart " regular rate and rhythm  normal tones   no murmurs   No gallops or rubs.  Ext- warm and dry   no edema  Skin-  no visualized rash  Foot monofilament test performed-  Sensation intact      Diagnostics:   Results for orders placed or performed in visit on 08/28/23   Hemoglobin A1c     Status: Abnormal   Result Value Ref Range    Hemoglobin A1C 7.4 (H) 0.0 - 5.6 %   UA with Microscopic reflex to Culture - Clinic Collect     Status: Abnormal    Specimen: Urine, Midstream   Result Value Ref Range    Color Urine Yellow Colorless, Straw, Light Yellow, Yellow    Appearance Urine Clear Clear    Glucose Urine >=1000 (A) Negative mg/dL    Bilirubin Urine Negative Negative    Ketones Urine 15 (A) Negative mg/dL    Specific Gravity Urine 1.020 1.005 - 1.030    Blood Urine Negative Negative    pH Urine 5.0 5.0 - 8.0    Protein Albumin Urine Negative Negative mg/dL    Urobilinogen Urine 0.2 0.2, 1.0 E.U./dL    Nitrite Urine Negative Negative    Leukocyte Esterase Urine Negative Negative   UA Microscopic with Reflex to Culture     Status: Abnormal   Result Value Ref Range    Bacteria Urine Few (A) None Seen /HPF    RBC Urine None Seen 0-2 /HPF /HPF    WBC Urine None Seen 0-5 /HPF /HPF    Narrative    Urine Culture not indicated           Please note: Voice recognition software was used in this dictation.  It may therefore contain typographical errors.

## 2023-08-28 NOTE — ASSESSMENT & PLAN NOTE
Indicates that his wife recently took a trip to [place] and came back and had intercourse he has some burning with urination.  No discharge.  Has generally trusted his partner in the past.  Does have some increased urinary frequency.  UA is negative.  Await trichomonas, gonorrhea chlamydia.  HIV also tested.

## 2023-08-28 NOTE — ASSESSMENT & PLAN NOTE
Microalbumin, blood pressure elevated here but good at home.  Asked him to bring in his home blood pressure cuff.

## 2023-08-29 LAB
C TRACH DNA SPEC QL PROBE+SIG AMP: NEGATIVE
HIV 1+2 AB+HIV1 P24 AG SERPL QL IA: NONREACTIVE
N GONORRHOEA DNA SPEC QL NAA+PROBE: NEGATIVE

## 2023-08-30 DIAGNOSIS — G44.209 MUSCLE TENSION HEADACHE: ICD-10-CM

## 2023-08-30 RX ORDER — ACETAMINOPHEN 325 MG/1
TABLET ORAL
Qty: 50 TABLET | Refills: 0 | OUTPATIENT
Start: 2023-08-30

## 2023-09-25 DIAGNOSIS — R80.9 MICROALBUMINURIA: ICD-10-CM

## 2023-09-26 RX ORDER — LISINOPRIL 5 MG/1
5 TABLET ORAL DAILY
Qty: 90 TABLET | Refills: 3 | Status: SHIPPED | OUTPATIENT
Start: 2023-09-26 | End: 2023-12-17

## 2023-11-30 DIAGNOSIS — E11.9 TYPE 2 DIABETES MELLITUS WITHOUT COMPLICATION, WITHOUT LONG-TERM CURRENT USE OF INSULIN (H): ICD-10-CM

## 2023-12-04 RX ORDER — METFORMIN HCL 500 MG
TABLET, EXTENDED RELEASE 24 HR ORAL
Qty: 360 TABLET | Refills: 0 | Status: SHIPPED | OUTPATIENT
Start: 2023-12-04 | End: 2024-03-28

## 2023-12-17 ENCOUNTER — APPOINTMENT (OUTPATIENT)
Dept: CT IMAGING | Facility: HOSPITAL | Age: 58
End: 2023-12-17
Attending: EMERGENCY MEDICINE
Payer: MEDICAID

## 2023-12-17 ENCOUNTER — HOSPITAL ENCOUNTER (EMERGENCY)
Facility: HOSPITAL | Age: 58
Discharge: HOME | End: 2023-12-17
Attending: EMERGENCY MEDICINE | Admitting: EMERGENCY MEDICINE
Payer: MEDICAID

## 2023-12-17 ENCOUNTER — APPOINTMENT (OUTPATIENT)
Dept: RADIOLOGY | Facility: HOSPITAL | Age: 58
End: 2023-12-17
Attending: EMERGENCY MEDICINE
Payer: MEDICAID

## 2023-12-17 VITALS
SYSTOLIC BLOOD PRESSURE: 133 MMHG | HEIGHT: 65 IN | BODY MASS INDEX: 27.11 KG/M2 | DIASTOLIC BLOOD PRESSURE: 79 MMHG | WEIGHT: 162.7 LBS | HEART RATE: 76 BPM | TEMPERATURE: 98.5 F | OXYGEN SATURATION: 100 % | RESPIRATION RATE: 18 BRPM

## 2023-12-17 DIAGNOSIS — T78.3XXA ANGIOEDEMA, INITIAL ENCOUNTER: ICD-10-CM

## 2023-12-17 LAB
ANION GAP SERPL CALCULATED.3IONS-SCNC: 12 MMOL/L (ref 7–15)
BASOPHILS # BLD AUTO: 0 10E3/UL (ref 0–0.2)
BASOPHILS NFR BLD AUTO: 0 %
BUN SERPL-MCNC: 13.8 MG/DL (ref 6–20)
CALCIUM SERPL-MCNC: 10.1 MG/DL (ref 8.6–10)
CHLORIDE SERPL-SCNC: 101 MMOL/L (ref 98–107)
CREAT SERPL-MCNC: 0.89 MG/DL (ref 0.67–1.17)
DEPRECATED HCO3 PLAS-SCNC: 25 MMOL/L (ref 22–29)
EGFRCR SERPLBLD CKD-EPI 2021: >90 ML/MIN/1.73M2
EOSINOPHIL # BLD AUTO: 0.1 10E3/UL (ref 0–0.7)
EOSINOPHIL NFR BLD AUTO: 2 %
ERYTHROCYTE [DISTWIDTH] IN BLOOD BY AUTOMATED COUNT: 11.4 % (ref 10–15)
FLUAV RNA SPEC QL NAA+PROBE: NEGATIVE
FLUBV RNA RESP QL NAA+PROBE: NEGATIVE
GLUCOSE SERPL-MCNC: 141 MG/DL (ref 70–99)
HCT VFR BLD AUTO: 42.1 % (ref 40–53)
HGB BLD-MCNC: 13.9 G/DL (ref 13.3–17.7)
IMM GRANULOCYTES # BLD: 0 10E3/UL
IMM GRANULOCYTES NFR BLD: 0 %
LYMPHOCYTES # BLD AUTO: 2 10E3/UL (ref 0.8–5.3)
LYMPHOCYTES NFR BLD AUTO: 38 %
MCH RBC QN AUTO: 27.1 PG (ref 26.5–33)
MCHC RBC AUTO-ENTMCNC: 33 G/DL (ref 31.5–36.5)
MCV RBC AUTO: 82 FL (ref 78–100)
MONOCYTES # BLD AUTO: 0.3 10E3/UL (ref 0–1.3)
MONOCYTES NFR BLD AUTO: 5 %
NEUTROPHILS # BLD AUTO: 2.8 10E3/UL (ref 1.6–8.3)
NEUTROPHILS NFR BLD AUTO: 55 %
NRBC # BLD AUTO: 0 10E3/UL
NRBC BLD AUTO-RTO: 0 /100
PLATELET # BLD AUTO: 232 10E3/UL (ref 150–450)
POTASSIUM SERPL-SCNC: 4 MMOL/L (ref 3.4–5.3)
RBC # BLD AUTO: 5.12 10E6/UL (ref 4.4–5.9)
RSV RNA SPEC NAA+PROBE: NEGATIVE
SARS-COV-2 RNA RESP QL NAA+PROBE: NEGATIVE
SODIUM SERPL-SCNC: 138 MMOL/L (ref 135–145)
WBC # BLD AUTO: 5.2 10E3/UL (ref 4–11)

## 2023-12-17 PROCEDURE — 96375 TX/PRO/DX INJ NEW DRUG ADDON: CPT

## 2023-12-17 PROCEDURE — 250N000011 HC RX IP 250 OP 636: Mod: JZ | Performed by: EMERGENCY MEDICINE

## 2023-12-17 PROCEDURE — 80048 BASIC METABOLIC PNL TOTAL CA: CPT | Performed by: EMERGENCY MEDICINE

## 2023-12-17 PROCEDURE — 99285 EMERGENCY DEPT VISIT HI MDM: CPT | Mod: 25

## 2023-12-17 PROCEDURE — 71046 X-RAY EXAM CHEST 2 VIEWS: CPT

## 2023-12-17 PROCEDURE — 250N000009 HC RX 250: Performed by: EMERGENCY MEDICINE

## 2023-12-17 PROCEDURE — 85025 COMPLETE CBC W/AUTO DIFF WBC: CPT | Performed by: EMERGENCY MEDICINE

## 2023-12-17 PROCEDURE — 36415 COLL VENOUS BLD VENIPUNCTURE: CPT | Performed by: EMERGENCY MEDICINE

## 2023-12-17 PROCEDURE — 70491 CT SOFT TISSUE NECK W/DYE: CPT

## 2023-12-17 PROCEDURE — 250N000011 HC RX IP 250 OP 636: Performed by: EMERGENCY MEDICINE

## 2023-12-17 PROCEDURE — 96374 THER/PROPH/DIAG INJ IV PUSH: CPT | Mod: 59

## 2023-12-17 PROCEDURE — 87637 SARSCOV2&INF A&B&RSV AMP PRB: CPT | Performed by: EMERGENCY MEDICINE

## 2023-12-17 RX ORDER — DEXAMETHASONE SODIUM PHOSPHATE 4 MG/ML
10 INJECTION, SOLUTION INTRA-ARTICULAR; INTRALESIONAL; INTRAMUSCULAR; INTRAVENOUS; SOFT TISSUE ONCE
Status: COMPLETED | OUTPATIENT
Start: 2023-12-17 | End: 2023-12-17

## 2023-12-17 RX ORDER — TRANEXAMIC ACID 10 MG/ML
1 INJECTION, SOLUTION INTRAVENOUS ONCE
Status: COMPLETED | OUTPATIENT
Start: 2023-12-17 | End: 2023-12-17

## 2023-12-17 RX ORDER — METHYLPREDNISOLONE 4 MG
TABLET, DOSE PACK ORAL
Qty: 21 TABLET | Refills: 0 | Status: SHIPPED | OUTPATIENT
Start: 2023-12-17 | End: 2024-03-28

## 2023-12-17 RX ORDER — DIPHENHYDRAMINE HYDROCHLORIDE 50 MG/ML
25 INJECTION INTRAMUSCULAR; INTRAVENOUS ONCE
Status: COMPLETED | OUTPATIENT
Start: 2023-12-17 | End: 2023-12-17

## 2023-12-17 RX ORDER — IOPAMIDOL 755 MG/ML
90 INJECTION, SOLUTION INTRAVASCULAR ONCE
Status: COMPLETED | OUTPATIENT
Start: 2023-12-17 | End: 2023-12-17

## 2023-12-17 RX ORDER — EPINEPHRINE 0.3 MG/.3ML
0.3 INJECTION SUBCUTANEOUS
Qty: 2 EACH | Refills: 0 | Status: SHIPPED | OUTPATIENT
Start: 2023-12-17

## 2023-12-17 RX ADMIN — TRANEXAMIC ACID 1 G: 10 INJECTION, SOLUTION INTRAVENOUS at 12:27

## 2023-12-17 RX ADMIN — IOPAMIDOL 90 ML: 755 INJECTION, SOLUTION INTRAVENOUS at 10:28

## 2023-12-17 RX ADMIN — DIPHENHYDRAMINE HYDROCHLORIDE 25 MG: 50 INJECTION INTRAMUSCULAR; INTRAVENOUS at 09:29

## 2023-12-17 RX ADMIN — DEXAMETHASONE SODIUM PHOSPHATE 10 MG: 4 INJECTION, SOLUTION INTRA-ARTICULAR; INTRALESIONAL; INTRAMUSCULAR; INTRAVENOUS; SOFT TISSUE at 09:31

## 2023-12-17 ASSESSMENT — ENCOUNTER SYMPTOMS
FEVER: 1
WHEEZING: 0
TROUBLE SWALLOWING: 1
COUGH: 1
FACIAL SWELLING: 1
SORE THROAT: 1

## 2023-12-17 ASSESSMENT — ACTIVITIES OF DAILY LIVING (ADL)
ADLS_ACUITY_SCORE: 35

## 2023-12-17 NOTE — ED TRIAGE NOTES
Pt presents with sore throat, right side, and swelling. Pt recently had swelling of lips and mouth but took Benadryl and it improved. Pt does take Lisinopril and has had oral swelling multiple times with no allergy testing done.      Triage Assessment (Adult)       Row Name 12/17/23 0757          Triage Assessment    Airway WDL WDL        Respiratory WDL    Respiratory WDL WDL        Skin Circulation/Temperature WDL    Skin Circulation/Temperature WDL WDL        Cardiac WDL    Cardiac WDL WDL        Peripheral/Neurovascular WDL    Peripheral Neurovascular WDL WDL        Cognitive/Neuro/Behavioral WDL    Cognitive/Neuro/Behavioral WDL WDL

## 2023-12-17 NOTE — DISCHARGE INSTRUCTIONS
We discussed your swelling was likely secondary to lisinopril.  This is called ACE inhibitor angioedema.  You need to stop lisinopril.  Your symptoms should improve gradually over the next 48 hours.  Recommend call your doctor tomorrow for recheck.  Is important that you come back to the ER immediately if symptoms worsen.  Recommend 6 days of Solu-Medrol. If your symptoms worsen use epinephrine which might help but often times does not for ACE inhibitor angioedema and call 911
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
## 2023-12-27 ENCOUNTER — OFFICE VISIT (OUTPATIENT)
Dept: FAMILY MEDICINE | Facility: CLINIC | Age: 58
End: 2023-12-27
Payer: MEDICAID

## 2023-12-27 VITALS
OXYGEN SATURATION: 97 % | HEART RATE: 79 BPM | TEMPERATURE: 97.5 F | WEIGHT: 161.75 LBS | DIASTOLIC BLOOD PRESSURE: 83 MMHG | HEIGHT: 65 IN | RESPIRATION RATE: 10 BRPM | SYSTOLIC BLOOD PRESSURE: 130 MMHG | BODY MASS INDEX: 26.95 KG/M2

## 2023-12-27 DIAGNOSIS — E11.9 TYPE 2 DIABETES MELLITUS WITHOUT COMPLICATION, WITHOUT LONG-TERM CURRENT USE OF INSULIN (H): ICD-10-CM

## 2023-12-27 DIAGNOSIS — R61 NIGHT SWEATS: ICD-10-CM

## 2023-12-27 DIAGNOSIS — T78.3XXD ANGIOEDEMA, SUBSEQUENT ENCOUNTER: Primary | ICD-10-CM

## 2023-12-27 LAB
CA-I BLD-MCNC: 4.9 MG/DL (ref 4.4–5.2)
CRP SERPL-MCNC: <3 MG/L
ERYTHROCYTE [SEDIMENTATION RATE] IN BLOOD BY WESTERGREN METHOD: 26 MM/HR (ref 0–20)
HBA1C MFR BLD: 8.2 % (ref 0–5.6)
TSH SERPL DL<=0.005 MIU/L-ACNC: 0.69 UIU/ML (ref 0.3–4.2)

## 2023-12-27 PROCEDURE — 83036 HEMOGLOBIN GLYCOSYLATED A1C: CPT | Performed by: FAMILY MEDICINE

## 2023-12-27 PROCEDURE — 91320 SARSCV2 VAC 30MCG TRS-SUC IM: CPT | Performed by: FAMILY MEDICINE

## 2023-12-27 PROCEDURE — 84443 ASSAY THYROID STIM HORMONE: CPT | Performed by: FAMILY MEDICINE

## 2023-12-27 PROCEDURE — 36415 COLL VENOUS BLD VENIPUNCTURE: CPT | Performed by: FAMILY MEDICINE

## 2023-12-27 PROCEDURE — 85652 RBC SED RATE AUTOMATED: CPT | Performed by: FAMILY MEDICINE

## 2023-12-27 PROCEDURE — 90480 ADMN SARSCOV2 VAC 1/ONLY CMP: CPT | Performed by: FAMILY MEDICINE

## 2023-12-27 PROCEDURE — 86140 C-REACTIVE PROTEIN: CPT | Performed by: FAMILY MEDICINE

## 2023-12-27 PROCEDURE — 99214 OFFICE O/P EST MOD 30 MIN: CPT | Mod: 25 | Performed by: FAMILY MEDICINE

## 2023-12-27 PROCEDURE — 82330 ASSAY OF CALCIUM: CPT | Performed by: FAMILY MEDICINE

## 2023-12-27 RX ORDER — GLIPIZIDE 2.5 MG/1
2.5 TABLET, EXTENDED RELEASE ORAL DAILY
Qty: 90 TABLET | Refills: 1 | Status: SHIPPED | OUTPATIENT
Start: 2023-12-27 | End: 2024-03-01 | Stop reason: SINTOL
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
## 2023-12-28 NOTE — ASSESSMENT & PLAN NOTE
Began after visit to [facility], no obvious fever or weakness, patient did take malaria medicine the whole time.  No rash.    Will check TSH  Malaria smears  CBC and inflammatory markers.  No high risk medications are on board

## 2023-12-28 NOTE — ASSESSMENT & PLAN NOTE
A1c 8.2 on metformin 2000 daily, off Invokana because of angioedema and because it caused such severe urinary frequency.  Discussed options for additional medications, glipizide 2.5, warned of risk of hypoglycemia, follow-up 3 months.  Continue atorvastatin, no ACE or ARB due to angioedema

## 2023-12-29 LAB — MALARIA SMEAR BLD: NEGATIVE

## 2024-01-26 DIAGNOSIS — M77.12 LATERAL EPICONDYLITIS OF LEFT ELBOW: Primary | ICD-10-CM

## 2024-01-26 DIAGNOSIS — E11.9 TYPE 2 DIABETES MELLITUS WITHOUT COMPLICATION, WITHOUT LONG-TERM CURRENT USE OF INSULIN (H): ICD-10-CM

## 2024-01-26 RX ORDER — METFORMIN HCL 500 MG
TABLET, EXTENDED RELEASE 24 HR ORAL
Qty: 360 TABLET | Refills: 0 | OUTPATIENT
Start: 2024-01-26

## 2024-01-29 RX ORDER — IBUPROFEN 600 MG/1
600 TABLET, FILM COATED ORAL EVERY 6 HOURS PRN
Qty: 50 TABLET | Refills: 0 | Status: SHIPPED | OUTPATIENT
Start: 2024-01-29 | End: 2024-03-28

## 2024-02-03 DIAGNOSIS — E11.9 TYPE 2 DIABETES MELLITUS WITHOUT COMPLICATION, WITHOUT LONG-TERM CURRENT USE OF INSULIN (H): ICD-10-CM

## 2024-02-05 RX ORDER — METFORMIN HCL 500 MG
TABLET, EXTENDED RELEASE 24 HR ORAL
Qty: 360 TABLET | Refills: 0 | OUTPATIENT
Start: 2024-02-05

## 2024-02-12 ENCOUNTER — MYC MEDICAL ADVICE (OUTPATIENT)
Dept: FAMILY MEDICINE | Facility: CLINIC | Age: 59
End: 2024-02-12
Payer: MEDICAID

## 2024-02-13 ENCOUNTER — NURSE TRIAGE (OUTPATIENT)
Dept: FAMILY MEDICINE | Facility: CLINIC | Age: 59
End: 2024-02-13
Payer: MEDICAID

## 2024-02-13 NOTE — TELEPHONE ENCOUNTER
Patient will go to [facility] today and will keep scheduled appointment with Dr Reinoso on 2/19/24.  No further clarita needed.    Ángela Gonzalez RN  [facility]

## 2024-02-13 NOTE — TELEPHONE ENCOUNTER
Nurse Triage SBAR    Is this a 2nd Level Triage? YES, LICENSED PRACTITIONER REVIEW IS REQUIRED    Situation: swelling and numbness of fingers.    Background:   - pt stated he started having swelling of fingers after taking glipizide.  - have been having symptoms are about a week now    Assessment:   - swelling of the joint on all fingers in both hands  - cannot bend fingers  - numbness in the fingers  - no fever  - no redness   - no puss    Protocol Recommended Disposition:   See in Office Today    Recommendation: offered appt with other available providers but pt refused. Pt would like to see Dr. Reinoso only.    Pt is also requesting to stop taking glipizide in the meantime.  Scheduled pt for Dr. Reinoso's reversed slot on 2/19/24     Advised pt to go toe Urgent care or walk-in clinic if symptoms worsens. Pt verbalized understanding.    Routed to provider    Does the patient meet one of the following criteria for ADS visit consideration? 16+ years old, with an [facility] PCP     TIP  Providers, please consider if this condition is appropriate for management at one of our Acute and Diagnostic Services sites.     If patient is a good candidate, please use dotphrase <dot>triageresponse and select Refer to ADS to document.      GUMARO Kothari CemN RN  [facility]      Reason for Disposition   Numbness (i.e., loss of sensation) in hand or fingers of new-onset    Additional Information   Negative: Followed an injury   Negative: Wound looks infected   Negative: Caused by an animal bite   Negative: Caused by frostbite   Negative: Hand or wrist pain is main symptom   Negative: Looks infected (spreading redness, red streak, pus) and severe pain with movement   Negative: Patient sounds very sick or weak to the triager   Negative: SEVERE pain (e.g., excruciating, unable to use hand at all)   Negative: Looks infected (e.g., spreading redness, red streak, pus)   Negative: Yellow pus under skin around fingernail  "(cuticle) or pus under fingernail   Negative: Painful blisters on fingertip    Answer Assessment - Initial Assessment Questions  1. ONSET: \"When did the pain start?\"       Both hands, every finger and feels numb    2. LOCATION and RADIATION: \"Where is the pain located?\"  (e.g., fingertip, around nail, joint, entire   finger)       Both hands    3. SEVERITY: \"How bad is the pain?\" \"What does it keep you from doing?\"   (Scale 1-10; or mild, moderate, severe)   - MILD (1-3): doesn't interfere with normal activities.    - MODERATE (4-7): interferes with normal activities or awakens from sleep.   - SEVERE (8-10): excruciating pain, unable to hold a glass of water or bend finger even a little.      More severe at night (9-10). Now it is in 6    4. APPEARANCE: \"What does the finger look like?\" (e.g., redness, swelling, bruising, pallor)      Swelling of the joint    5. WORK OR EXERCISE: \"Has there been any recent work or exercise that involved this part (i.e., fingers or hand) of the body?\"      No    6. CAUSE: \"What do you think is causing the pain?\"      Glipizide    7. AGGRAVATING FACTORS: \"What makes the pain worse?\" (e.g., using computer)      Worse at night    8. OTHER SYMPTOMS: \"Do you have any other symptoms?\" (e.g., fever, neck pain, numbness)      Numbness    9. PREGNANCY: \"Is there any chance you are pregnant?\" \"When was your last menstrual period?\"      no    Protocols used: Finger Pain-A-OH    "

## 2024-02-13 NOTE — TELEPHONE ENCOUNTER
RN left voicemail for patient to return call to [facility]. If patient calls back, please triage for hand/finger swelling, cramping, and numbness. Help patient schedule with Dr. Kemar MICHAEL if appropriate per triage disposition--reserved slot OK.

## 2024-02-13 NOTE — TELEPHONE ENCOUNTER
I think if his hand is warm and red like cellulitis, I would want him seen in the emergency room.  Otherwise, 2/19 should be fine.  I am guessing this is carpal tunnel syndrome.

## 2024-02-13 NOTE — TELEPHONE ENCOUNTER
"Dr. Reinoso,    Please review pt's mychart message and provide recommendation.     Paige CRISTOBAL RN  [facility]         \"After taking the newly prescribed medication from you, on Jan. 28, I took 2.5mg of Glipizide, 1 tablet daily. I noticed that, at night I cannot sleep because my fingers get swollen, I have aching muscle pain, and my finger cramping. The tips of my fingers and toes are numb. I can not  a pen off the floor. I can't even grab or lift a cup of water to drink. So what should I do?     Thank you and I await your response.     My name is Kurtis Senior and my phone number is [phone]     Sincerely,  Kurtis Senior\"  "

## 2024-02-13 NOTE — TELEPHONE ENCOUNTER
Please contact patient and offer him a reserved appointment as soon as possible to look at his hand.  I would guess this is carpal tunnel syndrom; but obviously if there are other symptoms that seem like cellulitis or something else, he should go to the emergency room.

## 2024-02-14 NOTE — TELEPHONE ENCOUNTER
Contacted patient on 2/13/24 and patient will go to [facility] UC today. Patient will keep scheduled appointment with Dr Reinoso on 2/19/24.  See triage encounter  No further clarita needed.     Ángela Gonzalez RN  [facility]

## 2024-03-01 ENCOUNTER — OFFICE VISIT (OUTPATIENT)
Dept: FAMILY MEDICINE | Facility: CLINIC | Age: 59
End: 2024-03-01
Payer: MEDICAID

## 2024-03-01 VITALS
RESPIRATION RATE: 20 BRPM | DIASTOLIC BLOOD PRESSURE: 79 MMHG | HEART RATE: 65 BPM | SYSTOLIC BLOOD PRESSURE: 147 MMHG | OXYGEN SATURATION: 99 % | TEMPERATURE: 96.9 F

## 2024-03-01 DIAGNOSIS — M79.2 NEUROPATHIC PAIN OF LEFT HAND: ICD-10-CM

## 2024-03-01 DIAGNOSIS — E11.9 TYPE 2 DIABETES MELLITUS WITHOUT COMPLICATION, WITHOUT LONG-TERM CURRENT USE OF INSULIN (H): Primary | ICD-10-CM

## 2024-03-01 DIAGNOSIS — M79.2 NEUROPATHIC PAIN OF RIGHT HAND: ICD-10-CM

## 2024-03-01 LAB — HBA1C MFR BLD: 7.3 % (ref 0–5.6)

## 2024-03-01 PROCEDURE — 83036 HEMOGLOBIN GLYCOSYLATED A1C: CPT | Performed by: STUDENT IN AN ORGANIZED HEALTH CARE EDUCATION/TRAINING PROGRAM

## 2024-03-01 PROCEDURE — 36415 COLL VENOUS BLD VENIPUNCTURE: CPT | Performed by: STUDENT IN AN ORGANIZED HEALTH CARE EDUCATION/TRAINING PROGRAM

## 2024-03-01 PROCEDURE — 99214 OFFICE O/P EST MOD 30 MIN: CPT | Performed by: STUDENT IN AN ORGANIZED HEALTH CARE EDUCATION/TRAINING PROGRAM

## 2024-03-01 NOTE — PROGRESS NOTES
Assessment & Plan     Type 2 diabetes mellitus without complication, without long-term current use of insulin (H)  Neuropathic pain of left hand  Neuropathic pain of right hand  Started glipizide in January and started having bilateral tingling and numbness in hands about one week after starting the medication. The symptoms have progressively worsened.  Works [employer] so we discussed possibility of carpal tunnel syndrome vs effect of glipizide though these symptoms are not listed as a possible side effect though tremor and myopathy are listed so possible neuro/msk effects of the medication. I recommended he see ortho for consult on r/o carpal tunnel syndrome. Also recommended stopping glipizide and see if the symptoms improve while off the medication. He has appointment with PCP at the end of the month to follow up. His A1C has come down well with glipizide from 8.2 to 7.3.  - Adult Endocrinology  Referral  - Hemoglobin A1c  - Hemoglobin A1c  - Orthopedic  Referral; Future    30 minutes spent on the date of the encounter doing chart review, review of test results, interpretation of tests, patient visit, and documentation         No follow-ups on file.    YADIRA [facility]     Kurtis is a 58 year old male who presents to clinic today for the following health issues:  Chief Complaint   Patient presents with    Medication Problem     X around early Jan 2024. Unable to make a fist or bend fingers, tingling and numbing sensation bilateral fingers after starting new rx glipizide. Some night sweats and get some headaches.      HPI  Started glipizide and started having these symptoms about one week after starting it. Works [employer].  He recently had angioedema from lisinopril and was seen in ED and advised to stop the medication. He then had frequent urination from [employer] and stopped the medication due to this effect of the  medication.    Review of Systems  Constitutional, HEENT, cardiovascular, pulmonary, GI, , musculoskeletal, neuro, skin, endocrine and psych systems are negative, except as otherwise noted.      Objective    BP (!) 147/79   Pulse 65   Temp 96.9  F (36.1  C) (Tympanic)   Resp 20   SpO2 99%   Physical Exam   GENERAL: alert and no distress  EYES: Eyes grossly normal to inspection, PERRL and conjunctivae and sclerae normal  HENT: ear canals and TM's normal, nose and mouth without ulcers or lesions  NECK: no adenopathy, no asymmetry, masses, or scars  RESP: lungs clear to auscultation - no rales, rhonchi or wheezes  CV: regular rate and rhythm, normal S1 S2, no S3 or S4, no murmur, click or rub, no peripheral edema  ABDOMEN: soft, nontender, no hepatosplenomegaly, no masses and bowel sounds normal  MS: no gross musculoskeletal defects noted, no edema  SKIN: no suspicious lesions or rashes  NEURO: Normal strength and tone, mentation intact and speech normal  PSYCH: mentation appears normal, affect normal/bright    Results for orders placed or performed in visit on 03/01/24 (from the past 24 hour(s))   Hemoglobin A1c   Result Value Ref Range    Hemoglobin A1C 7.3 (H) 0.0 - 5.6 %

## 2024-03-01 NOTE — PATIENT INSTRUCTIONS
Stop glipizide.    Schedule appointment with endocrinology. You will receive a call from a  to set up an appointment.    Caity Velasquez, CNP

## 2024-03-02 ENCOUNTER — HEALTH MAINTENANCE LETTER (OUTPATIENT)
Age: 59
End: 2024-03-02

## 2024-03-28 ENCOUNTER — OFFICE VISIT (OUTPATIENT)
Dept: FAMILY MEDICINE | Facility: CLINIC | Age: 59
End: 2024-03-28
Payer: MEDICAID

## 2024-03-28 VITALS
HEART RATE: 69 BPM | OXYGEN SATURATION: 100 % | TEMPERATURE: 97.3 F | RESPIRATION RATE: 24 BRPM | HEIGHT: 65 IN | SYSTOLIC BLOOD PRESSURE: 130 MMHG | DIASTOLIC BLOOD PRESSURE: 81 MMHG | BODY MASS INDEX: 26.95 KG/M2 | WEIGHT: 161.75 LBS

## 2024-03-28 DIAGNOSIS — M79.89 SWELLING OF HAND, UNSPECIFIED LATERALITY: ICD-10-CM

## 2024-03-28 DIAGNOSIS — I10 ESSENTIAL HYPERTENSION: ICD-10-CM

## 2024-03-28 DIAGNOSIS — G56.03 BILATERAL CARPAL TUNNEL SYNDROME: Primary | ICD-10-CM

## 2024-03-28 DIAGNOSIS — Z00.00 HEALTHCARE MAINTENANCE: ICD-10-CM

## 2024-03-28 DIAGNOSIS — E11.9 TYPE 2 DIABETES MELLITUS WITHOUT COMPLICATION, WITHOUT LONG-TERM CURRENT USE OF INSULIN (H): ICD-10-CM

## 2024-03-28 LAB
ERYTHROCYTE [DISTWIDTH] IN BLOOD BY AUTOMATED COUNT: 12.6 % (ref 10–15)
ERYTHROCYTE [SEDIMENTATION RATE] IN BLOOD BY WESTERGREN METHOD: 16 MM/HR (ref 0–20)
HBA1C MFR BLD: 7.3 % (ref 0–5.6)
HCT VFR BLD AUTO: 40.3 % (ref 40–53)
HGB BLD-MCNC: 13.5 G/DL (ref 13.3–17.7)
MCH RBC QN AUTO: 27.3 PG (ref 26.5–33)
MCHC RBC AUTO-ENTMCNC: 33.5 G/DL (ref 31.5–36.5)
MCV RBC AUTO: 81 FL (ref 78–100)
PLATELET # BLD AUTO: 145 10E3/UL (ref 150–450)
RBC # BLD AUTO: 4.95 10E6/UL (ref 4.4–5.9)
WBC # BLD AUTO: 4.7 10E3/UL (ref 4–11)

## 2024-03-28 PROCEDURE — 99214 OFFICE O/P EST MOD 30 MIN: CPT | Performed by: FAMILY MEDICINE

## 2024-03-28 PROCEDURE — 36415 COLL VENOUS BLD VENIPUNCTURE: CPT | Performed by: FAMILY MEDICINE

## 2024-03-28 PROCEDURE — 85027 COMPLETE CBC AUTOMATED: CPT | Performed by: FAMILY MEDICINE

## 2024-03-28 PROCEDURE — 85652 RBC SED RATE AUTOMATED: CPT | Performed by: FAMILY MEDICINE

## 2024-03-28 PROCEDURE — 83036 HEMOGLOBIN GLYCOSYLATED A1C: CPT | Performed by: FAMILY MEDICINE

## 2024-03-28 PROCEDURE — 87389 HIV-1 AG W/HIV-1&-2 AB AG IA: CPT | Performed by: FAMILY MEDICINE

## 2024-03-28 RX ORDER — METFORMIN HCL 500 MG
1000 TABLET, EXTENDED RELEASE 24 HR ORAL 2 TIMES DAILY WITH MEALS
Qty: 360 TABLET | Refills: 0 | Status: SHIPPED | OUTPATIENT
Start: 2024-03-28 | End: 2024-06-21

## 2024-03-28 RX ORDER — GLIPIZIDE 2.5 MG/1
2.5 TABLET, EXTENDED RELEASE ORAL DAILY
Qty: 90 TABLET | Refills: 1 | Status: SHIPPED | OUTPATIENT
Start: 2024-03-28 | End: 2024-09-15

## 2024-03-28 NOTE — ASSESSMENT & PLAN NOTE
Elevated blood pressure today.  Not a lot of ideal options for treatment.  Has proteinuria, would like to use ACE or ARB but has history of angioedema  Calcium channel blockers are not ideal given current symptoms of carpal tunnel syndrome  Could trial low-dose diuretic the patient was intolerant of increased urination with Jardiance.  Try clonidine.  Will await blood pressure recheck today and decide.

## 2024-03-28 NOTE — ASSESSMENT & PLAN NOTE
Almost certainly the diagnosis, bilateral hand pain, last 3 months, worse in the morning, gradually gets better through the day.  Discomfort is stiffness and tingling.  Positive Phalen sign.  Negative Tinel's sign.  EMG, carpal tunnel splints,  Will discuss follow-up with EMG results.  Discussed that I do some carpal tunnel injections but we could also have him see a hand surgeon.

## 2024-03-28 NOTE — PROGRESS NOTES
Assessment/ Plan  Type 2 diabetes mellitus (H)  Breann diabetes  On metformin  Glipizide brought to blood sugars into controlled well, A1c went down to 7.3.    Patient stopped medication because he thought this was leading to hand numbness.  I think the hand numbness is carpal tunnel syndrome and not has nothing to do with glipizide.    Will restart this medication.  Could consider trying to convert to sulfonylurea monotherapy in the future.  Up-to-date with ophthalmology, seeing them again soon anyway.  Blood pressure not well-controlled today, intolerant of statins.    Restart glipizide, follow-up 3 months    Healthcare maintenance  Check HIV    Bilateral carpal tunnel syndrome  Almost certainly the diagnosis, bilateral hand pain, last 3 months, worse in the morning, gradually gets better through the day.  Discomfort is stiffness and tingling.  Positive Phalen sign.  Negative Tinel's sign.  EMG, carpal tunnel splints,  Will discuss follow-up with EMG results.  Discussed that I do some carpal tunnel injections but we could also have him see a hand surgeon.    Essential hypertension  Elevated blood pressure today.  Not a lot of ideal options for treatment.  Has proteinuria, would like to use ACE or ARB but has history of angioedema  Calcium channel blockers are not ideal given current symptoms of carpal tunnel syndrome  Could trial low-dose diuretic the patient was intolerant of increased urination with Jardiance.  Try clonidine.  Will await blood pressure recheck today and decide.   Subjective  CC:  chief complaint  HPI:  History as noted above.  PFSH:  Patient Active Problem List   Diagnosis    Snoring    Type 2 diabetes mellitus (H)    Healthcare maintenance    Pain in joint, ankle and foot, right    Elevated blood pressure reading without diagnosis of hypertension    Microalbuminuria    Anemia, unspecified type    Benign paroxysmal positional vertigo    Bilateral low back pain without sciatica    Blunt eye trauma     Category 1 low vision of right eye with category 2 low vision of left eye    Constipation    Disorder of bursae and tendons in shoulder region    Dyslipidemia    Erectile dysfunction, unspecified erectile dysfunction type    Frequent PVCs    Gastritis and gastroduodenitis    Hearing loss    Heartburn    Lateral epicondylitis of left elbow    Mitral valve regurgitation    Tubular adenoma of colon    Urinary urgency    Pain    Benign prostatic hyperplasia with urinary frequency    Legal blindness    Monocular exotropia    Primary open-angle glaucoma, bilateral, severe stage    Dysuria    Angioedema, subsequent encounter    Night sweats    Bilateral carpal tunnel syndrome    Essential hypertension     acetaminophen (ACETAMINOPHEN EXTRA STRENGTH) 500 MG tablet, Take 1-2 tablets (500-1,000 mg) by mouth every 6 hours as needed for mild pain. (Patient not taking: Reported on 3/1/2024)  brimonidine-timolol (COMBIGAN) 0.2-0.5 % ophthalmic solution, INSTILL ONE DROP INTO EACH EYE TWICE DAILY (Patient not taking: Reported on 3/1/2024)  brinzolamide (AZOPT) 1 % ophthalmic suspension, INSTILL ONE DROP INTO EACH EYE TWICE DAILY (Patient not taking: Reported on 3/1/2024)  dorzolamide (TRUSOPT) 2 % ophthalmic solution, instill 1 drop by ophthalmic route 2 times every day in the RIGHT eye only (Patient not taking: Reported on 3/1/2024)  EPINEPHrine (ANY BX GENERIC EQUIV) 0.3 MG/0.3ML injection 2-pack, Inject 0.3 mLs (0.3 mg) into the muscle once as needed for anaphylaxis May repeat one time in 5-15 minutes if response to initial dose is inadequate. (Patient not taking: Reported on 3/1/2024)  ibuprofen (ADVIL/MOTRIN) 600 MG tablet, Take 1 tablet (600 mg) by mouth every 6 hours as needed for moderate pain. (Patient not taking: Reported on 3/1/2024)  methylPREDNISolone (MEDROL DOSEPAK) 4 MG tablet therapy pack, Follow Package Directions (Patient not taking: Reported on 3/1/2024)  prednisoLONE acetate (PRED FORTE) 1 % ophthalmic  "suspension, Instill 1 drop in LEFT eye 3 times a day until next seen (Patient not taking: Reported on 3/1/2024)    No current facility-administered medications on file prior to visit.       History   Smoking Status    Never   Smokeless Tobacco    Never     Social History     Social History Narrative    Not on file     Patient Care Team:  Iron Reinoso MD as PCP - General (Family Medicine)  Iron Reinoso MD as Assigned PCP        Objective  Physical Exam  Vitals:    03/28/24 1613 03/28/24 1617   BP: (!) 150/78 (!) 145/78   Pulse: 70 69   Resp: 24    Temp: 97.3  F (36.3  C)    SpO2: 100%    Weight: 73.4 kg (161 lb 12 oz)    Height: 1.657 m (5' 5.25\")      Body mass index is 26.71 kg/m .  Phalen sign positive, Tinel's sign negative.  No thenar atrophy.  Strength in his hands.  Chest clear, cardiac exam with regular rate.  No edema.  Diagnostics:   Pending      Please note: Voice recognition software was used in this dictation.  It may therefore contain typographical errors.      Answers submitted by the patient for this visit:  General Questionnaire (Submitted on 3/28/2024)  Chief Complaint: Chronic problems general questions HPI Form  What is the reason for your visit today? : Follow up diabetes    "

## 2024-03-28 NOTE — ASSESSMENT & PLAN NOTE
Breann diabetes  On metformin  Glipizide brought to blood sugars into controlled well, A1c went down to 7.3.    Patient stopped medication because he thought this was leading to hand numbness.  I think the hand numbness is carpal tunnel syndrome and not has nothing to do with glipizide.    Will restart this medication.  Could consider trying to convert to sulfonylurea monotherapy in the future.  Up-to-date with ophthalmology, seeing them again soon anyway.  Blood pressure not well-controlled today, intolerant of statins.    Restart glipizide, follow-up 3 months

## 2024-03-29 LAB — HIV 1+2 AB+HIV1 P24 AG SERPL QL IA: NONREACTIVE

## 2024-05-09 ENCOUNTER — TRANSFERRED RECORDS (OUTPATIENT)
Dept: MULTI SPECIALTY CLINIC | Facility: CLINIC | Age: 59
End: 2024-05-09

## 2024-05-09 LAB — RETINOPATHY: NORMAL

## 2024-05-23 ENCOUNTER — TELEPHONE (OUTPATIENT)
Dept: FAMILY MEDICINE | Facility: CLINIC | Age: 59
End: 2024-05-23
Payer: MEDICAID

## 2024-05-23 NOTE — TELEPHONE ENCOUNTER
He almost certainly has carpal tunnel syndrome based on my previous visit I do not think he should wait to see a neurologist.  I had ordered an EMG and do not see that this was completed.  Can we talk to him, state that that is the problem, we should get the EMG and if positive either I would see him back and inject his wrist or he should see an orthopedic doctor.    Schedulers could help schedule EMG, order for which was placed in March.    Let me know if there is disagreement with this plan.

## 2024-05-23 NOTE — TELEPHONE ENCOUNTER
Order/Referral Request    Who is requesting: Pt    Orders being requested: Referral for neurology    Reason service is needed/diagnosis: Hand Issues. Tingling, numbness, cramping, - Declined FNA    When are orders needed by: ASAP    Has this been discussed with Provider: Yes    Does patient have a preference on a Group/Provider/Facility? [facility]     Does patient have an appointment scheduled?: No    Where to send orders: Place orders within Epic    Could we send this information to you in [facility] or would you prefer to receive a phone call?:   Patient would prefer a phone call   Okay to leave a detailed message?: Yes at Cell number on file:    Telephone Information:   Mobile [phone]

## 2024-05-30 NOTE — TELEPHONE ENCOUNTER
Patient calling back and needs prior Authorization done for EMG as patient filed a  claim.  EMG scheduled on 6/28/24   insurance is: [employer]  Patient having bilateral hand pain, numbness, cramping, stiffness.     Patient will call back with  claim #  Will route message to [facility] auth [place] after claim # is received.    Ángela Gonzalez RN  [facility]

## 2024-05-30 NOTE — TELEPHONE ENCOUNTER
Patient called to check status of request. Relayed Dr. Reinoso's recommendation, patient agrees to plan. EMG is scheduled for 6/28/24, provided phone number for patient to schedule with ortho. No further action needed at this time.    Shauna Resendez RN  [facility]

## 2024-06-20 DIAGNOSIS — E11.9 TYPE 2 DIABETES MELLITUS WITHOUT COMPLICATION, WITHOUT LONG-TERM CURRENT USE OF INSULIN (H): ICD-10-CM

## 2024-06-21 RX ORDER — METFORMIN HCL 500 MG
1000 TABLET, EXTENDED RELEASE 24 HR ORAL 2 TIMES DAILY WITH MEALS
Qty: 360 TABLET | Refills: 0 | Status: SHIPPED | OUTPATIENT
Start: 2024-06-21 | End: 2024-09-15

## 2024-06-22 DIAGNOSIS — E11.9 TYPE 2 DIABETES MELLITUS WITHOUT COMPLICATION, WITHOUT LONG-TERM CURRENT USE OF INSULIN (H): ICD-10-CM

## 2024-06-24 RX ORDER — GLIPIZIDE 2.5 MG/1
2.5 TABLET, EXTENDED RELEASE ORAL DAILY
Qty: 90 TABLET | Refills: 1 | OUTPATIENT
Start: 2024-06-24

## 2024-06-28 ENCOUNTER — OFFICE VISIT (OUTPATIENT)
Dept: NEUROLOGY | Facility: CLINIC | Age: 59
End: 2024-06-28
Attending: FAMILY MEDICINE
Payer: OTHER MISCELLANEOUS

## 2024-06-28 DIAGNOSIS — G56.03 BILATERAL CARPAL TUNNEL SYNDROME: Primary | ICD-10-CM

## 2024-06-28 PROCEDURE — 95913 NRV CNDJ TEST 13/> STUDIES: CPT | Performed by: PSYCHIATRY & NEUROLOGY

## 2024-06-28 PROCEDURE — 99207 PR NO CHARGE NURSE ONLY: CPT | Performed by: PSYCHIATRY & NEUROLOGY

## 2024-06-28 PROCEDURE — 95886 MUSC TEST DONE W/N TEST COMP: CPT | Performed by: PSYCHIATRY & NEUROLOGY

## 2024-06-28 NOTE — LETTER
6/28/2024      Kurtis Senior  [address] [place] [address]      Dear Colleague,    Thank you for referring your patient, Kurtis Senior, to the [facility] [place]. Please see a copy of my visit note below.    See EMG report      Again, thank you for allowing me to participate in the care of your patient.        Sincerely,        juliann Martínez MD

## 2024-06-28 NOTE — PROCEDURES
[facility]    Formerly [facility]  [address] [place] [address], [address]  [place] [address]  Tel: [phone]  Fax: [phone]          Full Name: Kurtis Senior Gender: Male  Patient ID: [id number] YOB: 1965      Visit Date: 6/28/2024 13:28  Age: 58 Years 7 Months Old  Interpreted By: Iron Martínez MD   Ref Dr.: Iron Reinoso MD  Tech: ST   Height: 5 feet 5 inch  Reason for referral: Evaluate bilateral uppers. c/o numbness in both hands/fingers > 2 months. Right > Left.       Motor NCS      Nerve / Sites Lat Amp Dist Mehran    ms mV cm m/s   R Median - APB      Wrist 9.01 12.2 7       Elbow 14.90 11.2 27 46   R Ulnar - ADM      Wrist 3.59 17.0 7       B.Elbow 8.13 16.8 24 53      A.Elbow 11.20 16.3 13 42       Motor NCS      Nerve / Sites Lat Amp Dist Mehran    ms mV cm m/s   L Median - APB      Wrist 8.33 10.2 7       Elbow 13.75 9.2 26 48   L Ulnar - ADM      Wrist 3.91 12.5 7       B.Elbow 7.97 11.4 22 54      A.Elbow 10.73 10.8 12 43       F  Wave      Nerve Fmin    ms   R Ulnar - ADM 34.06       F  Wave      Nerve Fmin    ms   L Ulnar - ADM 34.79       Sensory NCS      Nerve / Sites Onset Lat Pk Lat Amp.2-3 Dist Mehran Lat Diff    ms ms  V cm m/s ms   R Median - II (Antidr)      Wrist NR NR NR 13 NR    R Ulnar - V (Antidr)      Wrist 2.60 3.39 19.8 11 42    R Median, Ulnar - Transcarpal comparison      Median Palm NR NR NR 8 NR       Ulnar Palm 1.61 2.14 14.2 8 50          NR       Sensory NCS      Nerve / Sites Pk Lat NP Amp Dist    ms  V cm   L Median - II (Antidr)      Wrist NR NR 13   L Ulnar - V (Antidr)      Wrist 3.28 9.0 11   L Median, Ulnar - Transcarpal comparison      Median Palm NR NR 8      Ulnar Palm 2.55 8.9 8       EMG Summary Table     Spontaneous MUAP Rcmt Note   Muscle Fib PSW Fasc IA # Amp Dur PPP Rate Type   R. Brachioradialis None None None N N N N N N N   R. Pronator teres Occ Occ None N Sl Red Incr Incr N N N   R. Triceps  brachii Occ Occ None N Sl Red Incr Incr N N N   R. Anconeus Occ Occ None N Sl Red Incr Incr N N N   R. Extensor digitorum communis None None None N N N N N N N   R. Deltoid None None None N N N N N N N   R. Biceps brachii None None None N N N N N N N   R. Flexor carpi ulnaris None None None N Sl Red Incr Incr N N N   R. Abductor digiti minimi (manus) None None None N Sl Red Incr Incr N N N   R. First dorsal interosseous None None None N Sl Red Incr Incr N N N   R. Abductor pollicis brevis None None None N Mod Red Incr Incr N N N   L. Brachioradialis None None None N N N N N N N   L. Pronator teres None None None Incr Sl Red Incr Incr N N N   L. Triceps brachii None None None Incr Sl Red Incr Incr N N N   L. Anconeus None None None Incr Sl Red Incr Incr N N N   L. Biceps brachii None None None N N N N N N N   L. Deltoid None None None N N N N N N N   L. Flexor carpi ulnaris None None None N Sl Red Incr Incr N N N   L. Abductor digiti minimi (manus) None None None N Sl Red Incr Incr N N N   L. First dorsal interosseous None None None N Sl Red Incr Incr N N N   L. Abductor pollicis brevis None None None N Sl Mod Red Incr Incr N N N     Right median motor conduction Study prolonged distal latency 9.01 ms  Right median snap potential absent  Right median palmar latency absent    Left median motor conduction study prolonged distal latency 8.33 ms  Left median snap potential absent  Left median palmar latency absent    Right ulnar motor nerve study normal  Right ulnar F wave latency upper limit of normal  Right ulnar snap potential normal  Right ulnar palmar latency normal    Left ulnar motor conduction study normal  Left ulnar F wave latency upper limit of normal  Left ulnar snap potential normal  Left ulnar palmar latency normal    Needle examination right upper extremity APB no active denervation moderate chronic motor unit changes  Mild active denervation and mild chronic bony changes, pronator  teres/triceps/anconeus  Mild chronic bony changes FCU/ADM/FDI  Normal EDC/deltoid/biceps/brachioradialis    Needle examination left upper extremity, APB mild to moderate chronic motor unit changes no active denervation  Mild chronic motor unit changes, pronator teres/biceps/anconeus  Mild chronic motor changes FCU/ADM/FDI      Impression    Right median neuropathy at or distal to the wrist consistent with a carpal tunnel syndrome severe in degree electrophysiologically    2.  Left median neuropathy at or distal to  the wrist consistent with a carpal tunnel syndrome severe in degree electrophysiologically    3.  Right C7 motor radiculopathy mild in degree electrophysiologically with both active and chronic denervation seen.    4.  Right ulnar distribution mild chronic motor unit changes, ulnar distribution question none localizable mild ulnar neuropathy.    5.  Left C7 motor radiculopathy mild in degree electrophysiologically chronic in nature.    6.  Left ulnar distribution mild chronic motor changes, ulnar distribution question 9 localizable mild ulnar neuropathy

## 2024-07-05 ENCOUNTER — OFFICE VISIT (OUTPATIENT)
Dept: FAMILY MEDICINE | Facility: CLINIC | Age: 59
End: 2024-07-05
Payer: OTHER MISCELLANEOUS

## 2024-07-05 VITALS
TEMPERATURE: 97.8 F | WEIGHT: 163 LBS | HEIGHT: 65 IN | RESPIRATION RATE: 18 BRPM | OXYGEN SATURATION: 98 % | BODY MASS INDEX: 27.16 KG/M2 | DIASTOLIC BLOOD PRESSURE: 72 MMHG | HEART RATE: 68 BPM | SYSTOLIC BLOOD PRESSURE: 138 MMHG

## 2024-07-05 DIAGNOSIS — E11.9 TYPE 2 DIABETES MELLITUS WITHOUT COMPLICATION, WITHOUT LONG-TERM CURRENT USE OF INSULIN (H): ICD-10-CM

## 2024-07-05 DIAGNOSIS — R03.0 ELEVATED BP WITHOUT DIAGNOSIS OF HYPERTENSION: ICD-10-CM

## 2024-07-05 DIAGNOSIS — G56.03 SEVERE CARPAL TUNNEL SYNDROME OF BOTH WRISTS: Primary | ICD-10-CM

## 2024-07-05 PROCEDURE — G2211 COMPLEX E/M VISIT ADD ON: HCPCS | Performed by: FAMILY MEDICINE

## 2024-07-05 PROCEDURE — 99214 OFFICE O/P EST MOD 30 MIN: CPT | Performed by: FAMILY MEDICINE

## 2024-07-05 NOTE — PROGRESS NOTES
"  Assessment & Plan     Severe carpal tunnel syndrome of both wrists  Severe bilateral carpal tunnel syndrome verified by EMG  Discussed with patient that next step is to see a hand surgeon for bilateral carpal tunnel release surgery  Can continue to do stretches and wear splints at nighttime  I placed referral  Patient already has preop scheduled with Dr. Reinoso for July 18  Answered question   Orthopedic  Referral; Future    Type 2 diabetes mellitus (H)  Patient has had eye exam and this was sent to abstracting      Elevated blood pressure without diagnosis of hypertension  Has had previous angioedema with lisinopril  We discussed importance of good blood pressure control and factors that cause it including genetics, diet and exercise, stress   He has a blood pressure cuff at home and will keep an eye on his numbers  If they continue to be on average over 140/90 I would recommend he discuss starting another low-dose blood pressure medicine with his primary, Dr. Reinoso.  Note: I did offer shingles vaccine but patient declined.  Can discuss further with primary care doctor Dr. Reinoso              BMI  Estimated body mass index is 26.93 kg/m  as calculated from the following:    Height as of this encounter: 1.657 m (5' 5.24\").    Weight as of this encounter: 73.9 kg (163 lb).       Brianna Hull is a 58 year old, presenting for the following health issues:  Follow Up (Pt stated follow up on Neurological )        7/5/2024    11:38 AM   Additional Questions   Roomed by Neelima     Via the Health Maintenance questionnaire, the patient has reported the following services have been completed -Eye Exam: [facility] 2024-05-09, this information has been sent to the abstraction team.  History of Present Illness       Reason for visit:  Follow-up  Symptom onset:  More than a month  Symptoms include:  Aches in hands  Symptom intensity:  Severe  Symptom progression:  Staying the same  Had these symptoms before: " " No  What makes it worse:  Not sure  What makes it better:  Nothing    He eats 0-1 servings of fruits and vegetables daily.He consumes 0 sweetened beverage(s) daily.   He is taking medications regularly.           Objective    /72   Pulse 68   Temp 97.8  F (36.6  C) (Temporal)   Resp 18   Ht 1.657 m (5' 5.24\")   Wt 73.9 kg (163 lb)   SpO2 98%   BMI 26.93 kg/m    Body mass index is 26.93 kg/m .  Physical Exam   GENERAL: alert and no distress          Prior to immunization administration, verified patients identity using patient s name and date of birth. Please see Immunization Activity for additional information.     Screening Questionnaire for Adult Immunization    Are you sick today?   Yes   Do you have allergies to medications, food, a vaccine component or latex?   No   Have you ever had a serious reaction after receiving a vaccination?   No   Do you have a long-term health problem with heart, lung, kidney, or metabolic disease (e.g., diabetes), asthma, a blood disorder, no spleen, complement component deficiency, a cochlear implant, or a spinal fluid leak?  Are you on long-term aspirin therapy?   No   Do you have cancer, leukemia, HIV/AIDS, or any other immune system problem?   No   Do you have a parent, brother, or sister with an immune system problem?   No   In the past 3 months, have you taken medications that affect  your immune system, such as prednisone, other steroids, or anticancer drugs; drugs for the treatment of rheumatoid arthritis, Crohn s disease, or psoriasis; or have you had radiation treatments?   No   Have you had a seizure, or a brain or other nervous system problem?   No   During the past year, have you received a transfusion of blood or blood    products, or been given immune (gamma) globulin or antiviral drug?   No   For women: Are you pregnant or is there a chance you could become       pregnant during the next month?   No   Have you received any vaccinations in the past 4 " weeks?   No     Immunization questionnaire was positive for at least one answer.  Notified Dr Lara.      Patient instructed to remain in clinic for 15 minutes afterwards, and to report any adverse reactions.     Screening performed by Neelima Barnard on 7/5/2024 at 11:42 AM.   Signed Electronically by: Monica Lara MD      The longitudinal plan of care for the diagnosis(es)/condition(s) as documented were addressed during this visit. Due to the added complexity in care, I will continue to support Kurtis in the subsequent management and with ongoing continuity of care.

## 2024-07-18 ENCOUNTER — OFFICE VISIT (OUTPATIENT)
Dept: FAMILY MEDICINE | Facility: CLINIC | Age: 59
End: 2024-07-18
Payer: MEDICAID

## 2024-07-18 VITALS
HEART RATE: 62 BPM | BODY MASS INDEX: 26.99 KG/M2 | HEIGHT: 65 IN | TEMPERATURE: 97.4 F | SYSTOLIC BLOOD PRESSURE: 139 MMHG | DIASTOLIC BLOOD PRESSURE: 78 MMHG | RESPIRATION RATE: 18 BRPM | WEIGHT: 162 LBS | OXYGEN SATURATION: 100 %

## 2024-07-18 DIAGNOSIS — E11.9 TYPE 2 DIABETES MELLITUS WITHOUT COMPLICATION, WITHOUT LONG-TERM CURRENT USE OF INSULIN (H): ICD-10-CM

## 2024-07-18 DIAGNOSIS — Z01.818 PREOP EXAMINATION: Primary | ICD-10-CM

## 2024-07-18 DIAGNOSIS — H40.1133 PRIMARY OPEN-ANGLE GLAUCOMA, BILATERAL, SEVERE STAGE: ICD-10-CM

## 2024-07-18 LAB
HBA1C MFR BLD: 7.6 % (ref 0–5.6)
HGB BLD-MCNC: 13.2 G/DL (ref 13.3–17.7)
POTASSIUM SERPL-SCNC: 4.1 MMOL/L (ref 3.4–5.3)

## 2024-07-18 PROCEDURE — 85018 HEMOGLOBIN: CPT | Performed by: FAMILY MEDICINE

## 2024-07-18 PROCEDURE — 84132 ASSAY OF SERUM POTASSIUM: CPT | Performed by: FAMILY MEDICINE

## 2024-07-18 PROCEDURE — 83036 HEMOGLOBIN GLYCOSYLATED A1C: CPT | Performed by: FAMILY MEDICINE

## 2024-07-18 PROCEDURE — 36415 COLL VENOUS BLD VENIPUNCTURE: CPT | Performed by: FAMILY MEDICINE

## 2024-07-18 PROCEDURE — 99214 OFFICE O/P EST MOD 30 MIN: CPT | Performed by: FAMILY MEDICINE
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
## 2024-07-18 NOTE — LETTER
July 18, 2024      Kurtis Peterson [address] [place] [address]        To Whom It May Concern,     This is a brief note to express my belief that Kurtis's bilateral carpal tunnel syndrome is larger related to his work.  He reports that his job has been [employer].  Beginning in November 2023, the stiffness of the material increased and there was a resulting increase in heaviness of the work that he did for 8 hours a day.  That is when his problem started to come on, and this fits with him seeking medical attention in March 2024.  He also reports that when he started doing lighter work, his symptoms improved somewhat (subtotally)    When I first saw him, he was under the mistaken belief that this was related to a side effect of medications.  I feel that is largely related to his occupation.      Sincerely,        Iron Reinoso MD

## 2024-07-23 ENCOUNTER — MYC MEDICAL ADVICE (OUTPATIENT)
Dept: FAMILY MEDICINE | Facility: CLINIC | Age: 59
End: 2024-07-23
Payer: MEDICAID

## 2024-07-23 NOTE — LETTER
July 26, 2024      Kurtis Senior  [address] [place] [address]        To Whom It May Concern:    Kurtis Senior was seen in our clinic. He may return to work with the following: Kurtis should avoid repetitive movements with the affected arm/hand.  He may return to work on 7/29/2024.      Sincerely,

## 2024-07-24 NOTE — TELEPHONE ENCOUNTER
Patient calling back. Patient needs provider to update to the letter that was written by provider on 7/18/2024. Patient's employer wants to know what his restrictions are and how long are the restrictions for. Please update the letter with the designated restrictions.

## 2024-07-29 ENCOUNTER — TELEPHONE (OUTPATIENT)
Dept: ORTHOPEDICS | Facility: CLINIC | Age: 59
End: 2024-07-29
Payer: MEDICAID

## 2024-07-29 NOTE — TELEPHONE ENCOUNTER
Called and left a voicemail for the patient.  Consent to communicate is on file.  Patient is requesting to see a hand surgeon for carpal tunnel surgery, however, referral was either placed wrong or scheduling was incorrect.  The goal is to help him get in with a hand specialist and save him a visit for tomorrow.  Will send a [employer] message outlining this.  Will not cancel appointment until he calls us back or acknowledges our message.    Rodrigo Bishop, ATC

## 2024-07-31 NOTE — TELEPHONE ENCOUNTER
I just wrote a letter with restrictions.  Please fax to the number on the bottom of the form and included here.  Also let patient know this has been done.

## 2024-08-01 NOTE — TELEPHONE ENCOUNTER
Faxed letter to [phone]. Notfied patient via [employer] that letter has been faxed. Completing task.

## 2024-08-07 ENCOUNTER — MYC MEDICAL ADVICE (OUTPATIENT)
Dept: FAMILY MEDICINE | Facility: CLINIC | Age: 59
End: 2024-08-07
Payer: MEDICAID

## 2024-08-07 DIAGNOSIS — R52 MILD PAIN: Primary | ICD-10-CM

## 2024-08-07 DIAGNOSIS — M77.12 LATERAL EPICONDYLITIS OF LEFT ELBOW: ICD-10-CM

## 2024-08-07 RX ORDER — IBUPROFEN 600 MG/1
600 TABLET, FILM COATED ORAL EVERY 6 HOURS PRN
Qty: 90 TABLET | Refills: 3 | Status: SHIPPED | OUTPATIENT
Start: 2024-08-07

## 2024-08-07 RX ORDER — ACETAMINOPHEN 500 MG
500-1000 TABLET ORAL EVERY 6 HOURS PRN
Qty: 90 TABLET | Refills: 3 | Status: SHIPPED | OUTPATIENT
Start: 2024-08-07

## 2024-08-29 NOTE — TELEPHONE ENCOUNTER
DIAGNOSIS: Severe carpal tunnel syndrome of both wrists//emg/wc/ortho cons    APPOINTMENT DATE: 9/4/24   NOTES STATUS DETAILS   OFFICE NOTE from referring provider Internal 7/5/24 - Monica Lara MD - [facility]    EMG report Internal 6/28/24   MEDICATION LIST Internal

## 2024-09-04 ENCOUNTER — OFFICE VISIT (OUTPATIENT)
Dept: ORTHOPEDICS | Facility: CLINIC | Age: 59
End: 2024-09-04
Attending: FAMILY MEDICINE
Payer: OTHER MISCELLANEOUS

## 2024-09-04 ENCOUNTER — PRE VISIT (OUTPATIENT)
Dept: ORTHOPEDICS | Facility: CLINIC | Age: 59
End: 2024-09-04
Payer: MEDICAID

## 2024-09-04 DIAGNOSIS — G56.03 SEVERE CARPAL TUNNEL SYNDROME OF BOTH WRISTS: ICD-10-CM

## 2024-09-04 PROCEDURE — 99202 OFFICE O/P NEW SF 15 MIN: CPT | Mod: GC | Performed by: ORTHOPAEDIC SURGERY
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
## 2024-09-04 NOTE — NURSING NOTE
Reason For Visit:   Chief Complaint   Patient presents with    Consult     Severe bilateral carpal tunnel syndrome.        Primary MD: Iron Reinoso  Ref. MD: Blaze    Age: 58 year old    ?  No      There were no vitals taken for this visit.      Pain Assessment  Patient Currently in Pain: Yes  0-10 Pain Scale: 7  Primary Pain Location: Wrist (bilateral)  Pain Descriptors: Numbness, Constant (Has severely interrupted sleep in the past)    Hand Dominance Evaluation  Hand Dominance: Right          QuickDASH Assessment       No data to display                   Current Outpatient Medications   Medication Sig Dispense Refill    acetaminophen (TYLENOL) 500 MG tablet Take 1-2 tablets (500-1,000 mg) by mouth every 6 hours as needed for mild pain 90 tablet 3    brimonidine-timolol (COMBIGAN) 0.2-0.5 % ophthalmic solution INSTILL ONE DROP INTO EACH EYE TWICE DAILY (Patient not taking: Reported on 3/1/2024)      brinzolamide (AZOPT) 1 % ophthalmic suspension INSTILL ONE DROP INTO EACH EYE TWICE DAILY (Patient not taking: Reported on 3/1/2024)      dorzolamide (TRUSOPT) 2 % ophthalmic solution instill 1 drop by ophthalmic route 2 times every day in the RIGHT eye only (Patient not taking: Reported on 3/1/2024)      EPINEPHrine (ANY BX GENERIC EQUIV) 0.3 MG/0.3ML injection 2-pack Inject 0.3 mLs (0.3 mg) into the muscle once as needed for anaphylaxis May repeat one time in 5-15 minutes if response to initial dose is inadequate. (Patient not taking: Reported on 3/1/2024) 2 each 0    glipiZIDE (GLUCOTROL XL) 2.5 MG 24 hr tablet Take 1 tablet (2.5 mg) by mouth daily 90 tablet 1    ibuprofen (ADVIL/MOTRIN) 600 MG tablet Take 1 tablet (600 mg) by mouth every 6 hours as needed for moderate pain 90 tablet 3    metFORMIN (GLUCOPHAGE XR) 500 MG 24 hr tablet TAKE 2 TABLETS(1000 MG) BY MOUTH TWICE DAILY WITH MEALS 360 tablet 0    prednisoLONE acetate (PRED FORTE) 1 % ophthalmic suspension Instill 1 drop in LEFT eye 3 times a day  until next seen (Patient not taking: Reported on 3/1/2024)         Allergies   Allergen Reactions    Chloroquine Itching    Invokana [Canagliflozin]     Lisinopril Angioedema     Likely ACEI angioedema    Pyrimethamine Blisters    Sodium Fluoride Other (See Comments)     Contact Dermatitis - Pt states that he is allergic to a Phosphate medication, he got blisters    Statins [Statins] Muscle Pain (Myalgia)     Both atorvastatin and pravastatin    Sulfa Antibiotics Itching    Latex Rash       Kerline Martell, ATC

## 2024-09-04 NOTE — NURSING NOTE
Teaching Flowsheet   Relevant Diagnosis: Bilateral carpal tunnel syndrome  Teaching Topic: Left open carpal tunnel release    CSC under local only anesthesia with Dr Praful Bhagat.    Dr Bhagat advised patient to have a physical due to hx of low/ abnormal platelets.  This is not required prior to surgery but patient will seek to have this completed soon.     Patient legally blind.    Patient with DM Type 2, last HgbA1C  on 7-18-24 @ 7.6%    Person(s) involved in teaching:   Patient     Motivation Level:  Asks Questions: Yes  Eager to Learn: Yes  Cooperative: Yes  Receptive (willing/able to accept information): Yes  Any cultural factors/Orthodoxy beliefs that may influence understanding or compliance? No    Patient demonstrates understanding of the following:  Reason for the appointment, diagnosis and treatment plan: Yes  Knowledge of proper use of medications and conditions for which they are ordered (with special attention to potential side effects or drug interactions): Yes  Which situations necessitate calling provider and whom to contact: Yes    Teaching Concerns Addressed:   Proper use and care of  (medical equip, care aids, etc.): Yes  Nutritional needs and diet plan: Yes  Pain management techniques: Yes  Wound Care: Yes  How and/when to access community resources: Yes     Instructional Materials Used/Given: Preoperative surgery packet, antibacterial Chlorhexidine soap. Stop Light Tool reviewed, after-hours number provided, patient verbalized understanding, had no immediate questions. Paige Combs RN
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
## 2024-09-10 ENCOUNTER — TELEPHONE (OUTPATIENT)
Dept: ORTHOPEDICS | Facility: CLINIC | Age: 59
End: 2024-09-10
Payer: MEDICAID

## 2024-09-10 NOTE — TELEPHONE ENCOUNTER
Phoned patient to get him scheduled for surgery with Dr. Bhagat     Call went to voicemail.  Provided call back number in voicemail:   [phone] & [phone] for care team.   Will try again.

## 2024-09-11 NOTE — TELEPHONE ENCOUNTER
Spoke with patient; he wanted to have the care team call him prior to scheduling as he has questions as he was recommended to have hand therapy done prior t scheduling surgery.     No information regarding this in surgery case request.     Informed patient that care team will give him a c/b at their convenience. Patient will call back to [phone] after he talks to care team and determines when he would like to be schedule.     No further questions or concerns.

## 2024-09-14 DIAGNOSIS — E11.9 TYPE 2 DIABETES MELLITUS WITHOUT COMPLICATION, WITHOUT LONG-TERM CURRENT USE OF INSULIN (H): ICD-10-CM

## 2024-09-15 DIAGNOSIS — E11.9 TYPE 2 DIABETES MELLITUS WITHOUT COMPLICATION, WITHOUT LONG-TERM CURRENT USE OF INSULIN (H): ICD-10-CM

## 2024-09-15 RX ORDER — METFORMIN HCL 500 MG
1000 TABLET, EXTENDED RELEASE 24 HR ORAL 2 TIMES DAILY WITH MEALS
Qty: 360 TABLET | Refills: 0 | Status: SHIPPED | OUTPATIENT
Start: 2024-09-15

## 2024-09-15 RX ORDER — GLIPIZIDE 2.5 MG/1
2.5 TABLET, EXTENDED RELEASE ORAL DAILY
Qty: 90 TABLET | Refills: 0 | Status: SHIPPED | OUTPATIENT
Start: 2024-09-15 | End: 2024-10-01

## 2024-09-18 ENCOUNTER — TRANSFERRED RECORDS (OUTPATIENT)
Dept: HEALTH INFORMATION MANAGEMENT | Facility: CLINIC | Age: 59
End: 2024-09-18
Payer: MEDICAID

## 2024-09-18 LAB — RETINOPATHY: POSITIVE

## 2024-09-20 NOTE — TELEPHONE ENCOUNTER
Patient left a  asking for a call back to schedule surgery with Dr. Bhagat. Patient asked to be called back at [phone].     Devorah Tripp on 9/20/2024 at 3:50 PM

## 2024-09-23 ENCOUNTER — TELEPHONE (OUTPATIENT)
Dept: ORTHOPEDICS | Facility: CLINIC | Age: 59
End: 2024-09-23
Payer: MEDICAID

## 2024-09-23 NOTE — TELEPHONE ENCOUNTER
[facility] Call Center    Phone Message    May a detailed message be left on voicemail: yes     Reason for Call: Other: you can call or send my chart message. Patient states that he has called several times to get a surgery scheduled.      Action Taken: Other: te    Travel Screening: Not Applicable     Date of Service:

## 2024-09-24 ENCOUNTER — PREP FOR PROCEDURE (OUTPATIENT)
Dept: ORTHOPEDICS | Facility: CLINIC | Age: 59
End: 2024-09-24
Payer: MEDICAID

## 2024-09-24 PROBLEM — G56.03 SEVERE CARPAL TUNNEL SYNDROME OF BOTH WRISTS: Status: ACTIVE | Noted: 2024-09-04

## 2024-09-24 NOTE — TELEPHONE ENCOUNTER
Patient is scheduled for surgery with Dr. Bhagat    Spoke with: Kurtis    Date of Surgery: 10/16/24    Location: [facility]    Informed patient they will need an adult  Yes    Pre op with Provider N/A-Local    Additional imaging/appointments: PO Made    Surgery packet: Received     Additional comments: Pt is requesting to have his right hand done first as its more symptomatic. Will route to care team and surgeon.         Joanne Poe on 9/24/2024 at 9:04 AM

## 2024-09-24 NOTE — TELEPHONE ENCOUNTER
Scheduling team called and helped set a date for 10-16-24, but then patient asked to change laterality of the procedure.  See other telephone encounter. Paieg Combs RN

## 2024-10-01 ENCOUNTER — OFFICE VISIT (OUTPATIENT)
Dept: FAMILY MEDICINE | Facility: CLINIC | Age: 59
End: 2024-10-01
Payer: OTHER MISCELLANEOUS

## 2024-10-01 VITALS
HEIGHT: 65 IN | SYSTOLIC BLOOD PRESSURE: 128 MMHG | OXYGEN SATURATION: 97 % | WEIGHT: 164 LBS | DIASTOLIC BLOOD PRESSURE: 68 MMHG | RESPIRATION RATE: 16 BRPM | HEART RATE: 67 BPM | BODY MASS INDEX: 27.32 KG/M2 | TEMPERATURE: 96.9 F

## 2024-10-01 DIAGNOSIS — Z01.818 PREOP EXAMINATION: ICD-10-CM

## 2024-10-01 DIAGNOSIS — D69.6 THROMBOCYTOPENIA (H): ICD-10-CM

## 2024-10-01 DIAGNOSIS — Z00.00 HEALTHCARE MAINTENANCE: ICD-10-CM

## 2024-10-01 DIAGNOSIS — E11.9 TYPE 2 DIABETES MELLITUS WITHOUT COMPLICATION, WITHOUT LONG-TERM CURRENT USE OF INSULIN (H): Primary | ICD-10-CM

## 2024-10-01 DIAGNOSIS — G56.03 SEVERE CARPAL TUNNEL SYNDROME OF BOTH WRISTS: ICD-10-CM

## 2024-10-01 LAB
ALT SERPL W P-5'-P-CCNC: 36 U/L (ref 0–70)
ANION GAP SERPL CALCULATED.3IONS-SCNC: 12 MMOL/L (ref 7–15)
BUN SERPL-MCNC: 15.3 MG/DL (ref 6–20)
CALCIUM SERPL-MCNC: 9.5 MG/DL (ref 8.8–10.4)
CHLORIDE SERPL-SCNC: 102 MMOL/L (ref 98–107)
CHOLEST SERPL-MCNC: 169 MG/DL
CREAT SERPL-MCNC: 0.92 MG/DL (ref 0.67–1.17)
CREAT UR-MCNC: 276 MG/DL
EGFRCR SERPLBLD CKD-EPI 2021: >90 ML/MIN/1.73M2
ERYTHROCYTE [DISTWIDTH] IN BLOOD BY AUTOMATED COUNT: 13 % (ref 10–15)
EST. AVERAGE GLUCOSE BLD GHB EST-MCNC: 177 MG/DL
FASTING STATUS PATIENT QL REPORTED: YES
FASTING STATUS PATIENT QL REPORTED: YES
GLUCOSE SERPL-MCNC: 184 MG/DL (ref 70–99)
HBA1C MFR BLD: 7.8 % (ref 0–5.6)
HCO3 SERPL-SCNC: 25 MMOL/L (ref 22–29)
HCT VFR BLD AUTO: 39.9 % (ref 40–53)
HDLC SERPL-MCNC: 33 MG/DL
HGB BLD-MCNC: 13.5 G/DL (ref 13.3–17.7)
HGB BLD-MCNC: 13.5 G/DL (ref 13.3–17.7)
LDLC SERPL CALC-MCNC: 110 MG/DL
MCH RBC QN AUTO: 27.6 PG (ref 26.5–33)
MCHC RBC AUTO-ENTMCNC: 33.8 G/DL (ref 31.5–36.5)
MCV RBC AUTO: 81 FL (ref 78–100)
MICROALBUMIN UR-MCNC: 14.8 MG/L
MICROALBUMIN/CREAT UR: 5.36 MG/G CR (ref 0–17)
NONHDLC SERPL-MCNC: 136 MG/DL
PLATELET # BLD AUTO: 148 10E3/UL (ref 150–450)
POTASSIUM SERPL-SCNC: 3.9 MMOL/L (ref 3.4–5.3)
RBC # BLD AUTO: 4.9 10E6/UL (ref 4.4–5.9)
SODIUM SERPL-SCNC: 139 MMOL/L (ref 135–145)
TRIGL SERPL-MCNC: 130 MG/DL
WBC # BLD AUTO: 4.8 10E3/UL (ref 4–11)

## 2024-10-01 PROCEDURE — 90471 IMMUNIZATION ADMIN: CPT | Performed by: FAMILY MEDICINE

## 2024-10-01 PROCEDURE — 36415 COLL VENOUS BLD VENIPUNCTURE: CPT | Performed by: FAMILY MEDICINE

## 2024-10-01 PROCEDURE — 84460 ALANINE AMINO (ALT) (SGPT): CPT | Performed by: FAMILY MEDICINE

## 2024-10-01 PROCEDURE — 80061 LIPID PANEL: CPT | Performed by: FAMILY MEDICINE

## 2024-10-01 PROCEDURE — 82043 UR ALBUMIN QUANTITATIVE: CPT | Performed by: FAMILY MEDICINE

## 2024-10-01 PROCEDURE — 90480 ADMN SARSCOV2 VAC 1/ONLY CMP: CPT | Performed by: FAMILY MEDICINE

## 2024-10-01 PROCEDURE — 90673 RIV3 VACCINE NO PRESERV IM: CPT | Performed by: FAMILY MEDICINE

## 2024-10-01 PROCEDURE — 82570 ASSAY OF URINE CREATININE: CPT | Performed by: FAMILY MEDICINE

## 2024-10-01 PROCEDURE — 83036 HEMOGLOBIN GLYCOSYLATED A1C: CPT | Performed by: FAMILY MEDICINE

## 2024-10-01 PROCEDURE — 85027 COMPLETE CBC AUTOMATED: CPT | Performed by: FAMILY MEDICINE

## 2024-10-01 PROCEDURE — 91320 SARSCV2 VAC 30MCG TRS-SUC IM: CPT | Performed by: FAMILY MEDICINE

## 2024-10-01 PROCEDURE — 99214 OFFICE O/P EST MOD 30 MIN: CPT | Mod: 25 | Performed by: FAMILY MEDICINE

## 2024-10-01 PROCEDURE — 80048 BASIC METABOLIC PNL TOTAL CA: CPT | Performed by: FAMILY MEDICINE

## 2024-10-01 RX ORDER — GLIPIZIDE 2.5 MG/1
2.5 TABLET, EXTENDED RELEASE ORAL DAILY
Qty: 90 TABLET | Refills: 0 | Status: SHIPPED | OUTPATIENT
Start: 2024-10-01 | End: 2024-10-01

## 2024-10-01 RX ORDER — GLIPIZIDE 5 MG/1
5 TABLET, FILM COATED, EXTENDED RELEASE ORAL DAILY
Qty: 90 TABLET | Refills: 2 | Status: SHIPPED | OUTPATIENT
Start: 2024-10-01
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
## 2024-10-16 ENCOUNTER — HOSPITAL ENCOUNTER (OUTPATIENT)
Facility: AMBULATORY SURGERY CENTER | Age: 59
Discharge: HOME | End: 2024-10-16
Attending: ORTHOPAEDIC SURGERY | Admitting: ORTHOPAEDIC SURGERY
Payer: MEDICAID

## 2024-10-16 VITALS
DIASTOLIC BLOOD PRESSURE: 79 MMHG | RESPIRATION RATE: 16 BRPM | SYSTOLIC BLOOD PRESSURE: 145 MMHG | TEMPERATURE: 96.9 F | HEART RATE: 58 BPM | OXYGEN SATURATION: 98 % | HEIGHT: 65 IN | BODY MASS INDEX: 26.66 KG/M2 | WEIGHT: 160 LBS

## 2024-10-16 DIAGNOSIS — G56.03 SEVERE CARPAL TUNNEL SYNDROME OF BOTH WRISTS: Primary | ICD-10-CM

## 2024-10-16 LAB — GLUCOSE BLDC GLUCOMTR-MCNC: 190 MG/DL (ref 70–99)

## 2024-10-16 PROCEDURE — 64721 CARPAL TUNNEL SURGERY: CPT | Mod: RT

## 2024-10-16 PROCEDURE — 82962 GLUCOSE BLOOD TEST: CPT | Performed by: PATHOLOGY

## 2024-10-16 RX ORDER — ACETAMINOPHEN 325 MG/1
650 TABLET ORAL
Status: DISCONTINUED | OUTPATIENT
Start: 2024-10-16 | End: 2024-10-17 | Stop reason: HOSPADM

## 2024-10-16 RX ORDER — OXYCODONE HYDROCHLORIDE 5 MG/1
5 TABLET ORAL EVERY 4 HOURS PRN
Qty: 5 TABLET | Refills: 0 | Status: SHIPPED | OUTPATIENT
Start: 2024-10-16

## 2024-10-16 RX ORDER — LIDOCAINE HYDROCHLORIDE AND EPINEPHRINE 10; 10 MG/ML; UG/ML
10 INJECTION, SOLUTION INFILTRATION; PERINEURAL ONCE
Status: COMPLETED | OUTPATIENT
Start: 2024-10-16 | End: 2024-10-16

## 2024-10-16 RX ORDER — IBUPROFEN 600 MG/1
600 TABLET, FILM COATED ORAL EVERY 6 HOURS PRN
Qty: 30 TABLET | Refills: 0 | Status: SHIPPED | OUTPATIENT
Start: 2024-10-16

## 2024-10-16 RX ORDER — ACETAMINOPHEN 325 MG/1
650 TABLET ORAL EVERY 4 HOURS PRN
Qty: 50 TABLET | Refills: 0 | Status: SHIPPED | OUTPATIENT
Start: 2024-10-16

## 2024-10-16 RX ADMIN — LIDOCAINE HYDROCHLORIDE AND EPINEPHRINE 10 ML: 10; 10 INJECTION, SOLUTION INFILTRATION; PERINEURAL at 12:04

## 2024-10-16 NOTE — DISCHARGE INSTRUCTIONS
[facility]  Home Care Following Your Procedure  Call a doctor if you have signs of infection (fever, growing tenderness at the surgery site, a large amount of drainage or bleeding, severe pain, foul-smelling drainage, redness, swelling).             Tylenol/Acetaminophen Consumption    If you feel your pain relief is insufficient, you may take Tylenol/Acetaminophen in addition to your narcotic pain medication.   Be careful not to exceed 4,000 mg of Tylenol/Acetaminophen in a 24 hour period from all sources.  If you are taking extra strength Tylenol/acetaminophen (500 mg), the maximum dose is 8 tablets in 24 hours.  If you are taking regular strength acetaminophen (325 mg), the maximum dose is 12 tablets in 24 hours.      Your doctor is:  Dr. Praful Bhagat, Orthopaedics: [phone]                  Or dial [phone] and ask for the resident on call for:  Orthopaedics  For emergency care, call the:  [facility] [place]: [phone] (TTY for hearing impaired: [phone])

## 2024-10-16 NOTE — BRIEF OP NOTE
[facility] Brief Operative Note    Pre-operative diagnosis: Severe carpal tunnel syndrome of both wrists [G56.03]   Post-operative diagnosis * No post-op diagnosis entered *  same   Procedure: Procedure(s):  RELEASE,RIGHT CARPAL TUNNEL   Surgeon(s): Surgeons and Role:     * Praful Bhagat MD - Primary     * Alhaji Saucedo MD - Resident - Assisting     * Tanya Cee MD - Fellow - Assisting   Estimated blood loss: * No values recorded between 10/16/2024 12:25 PM and 10/16/2024 12:43 PM *    Specimens: * No specimens in log *     Activity: Light use of the RUE  Weight bearing status: WBAT  Antibiotics: NA  Diet: Regular.  DVT prophylaxis: Mechanical  Elevation: Elevate RUE  Wound Care: Keep dressing in place for 3 days   Pain management: Orals PRN  Disposition: Discharge when meets PACU discharge criteria    Future Appointments   Date Time Provider Department Center   10/23/2024  9:20 AM Praful Bhagat MD [facility]         Parag Saucedo MD  Orthopaedic Surgery, PGY4

## 2024-10-16 NOTE — OR NURSING
[facility] OR [address]  5TH FLOOR  [facility] [id number] [phone]    10/16/2024    Kurtis Senior  [address] [place] [address]  [phone] (home)     :  1965    To Whom it May Concern:    Kurtis Senior missed work on 10/16/2024 due to surgery.  The patient is given a work excuse note for 7 days.     Please contact the surgery clinic for questions      Surgeon's Name Dr. Hilton Bhagat    [phone]

## 2024-10-16 NOTE — OP NOTE
OPERATIVE REPORT    PATIENT NAME: Kurtis Senior  MRN: [id number]    DATE: 10/16/2024    PREOPERATIVE DIAGNOSIS:   1. Right wrist carpal tunnel syndrome.    POSTOPERATIVE DIAGNOSIS:   1. Right wrist carpal tunnel syndrome.       OPERATION:   1. Right wrist carpal tunnel release. [id number]    SURGEON: Praful Bhagat MD     ASSISTANT(S): Rodrigo Saucedo MD PGY 4    STAFF: Circulator: Basia Gore RN  Scrub Person: Ankit Nielsen  Nursing Station Technician: Federico Christian    ANESTHESIA:  Local    IMPLANT(S): * No implants in log *    SPECIMEN: * No specimens in log *    ESTIMATED BLOOD LOSS: < 5 mL.    FLUIDS: See anesthesia records.     URINE OUTPUT: Not applicable.  Rivas was not placed.     TOURNIQUET TIME: 0 minutes.    COMPLICATIONS: None.     INDICATIONS: Kurtis Senior is a 58 year old male who developed right carpal tunnel syndrome. The patient did not respond to conservative management and was therefore indicated for operative intervention. The risks, benefits, and alternatives were discussed with the patient.  The patient verbalized understanding of the treatment plan and an informed consent was signed.     DESCRIPTION OF PROCEDURE: The patient was taken to the operating room and placed in supine position on the operating table. Anesthesia was administered by the Department of Anesthesia. A forearm  tourniquet was applied to the right upper extremity, which was then prepped and draped in the usual sterile fashion.  A timeout was performed with all OR staff.  The patient name, MRN, operative extremity, procedure, allergies, antibiotics, DVT prophylaxis, and fire precautions/plan were reviewed, and all were in agreement.     A longitudinal incision was made in the palm in-line with the 4th ray, just radial to the hook of hamate.  The incision extended from the distal wrist crease to Blair's cardinal line. The skin and the subcutaneous tissue were sharply incised.  Dissection was carried down to the palmar  fascia, which was incised followed by release of the transverse carpal ligament.  Distally the sentinel fat pad and superficial arch were identified and proximally the dissection was carried out under direct visualization to divide the antebrachial fascia and remainder of the transverse carpal ligament. There was significant compression of the median nerve at the level of the transverse carpal ligament.  A complete release was confirmed and exploration of the carpal canal revealed no masses. The median nerve and its branches were intact.  Hemostasis was achieved.  The wound was irrigated.  Closure was performed with 5-0 Monocryl subcuticular.  Sterile dressings were applied.  Capillary refill was intact < 2 seconds.      He was aroused from anesthesia and taken to the recovery room in stable condition.      All counts were correct at the end of the case.    There were no complications.      Praful Bhagat MD

## 2024-10-23 ENCOUNTER — OFFICE VISIT (OUTPATIENT)
Dept: ORTHOPEDICS | Facility: CLINIC | Age: 59
End: 2024-10-23
Payer: OTHER MISCELLANEOUS

## 2024-10-23 DIAGNOSIS — G56.03 SEVERE CARPAL TUNNEL SYNDROME OF BOTH WRISTS: Primary | ICD-10-CM

## 2024-10-23 PROCEDURE — 99024 POSTOP FOLLOW-UP VISIT: CPT | Performed by: ORTHOPAEDIC SURGERY

## 2024-10-23 NOTE — NURSING NOTE
Reason For Visit:   Chief Complaint   Patient presents with    Surgical Followup     Post op Release,right Carpal Tunnel - Right. DOS: 10/16/24       Primary MD: Iron Reinoso  Ref. MD: Blaze    Age: 58 year old    ?  No      There were no vitals taken for this visit.      Pain Assessment  Patient Currently in Pain: Yes  0-10 Pain Scale: 8  Primary Pain Location: Wrist (Right wrist and hand)  Pain Descriptors: Sore, Aching, Intermittent    Hand Dominance Evaluation  Hand Dominance: Right          QuickDASH Assessment       No data to display                   Current Outpatient Medications   Medication Sig Dispense Refill    acetaminophen (TYLENOL) 325 MG tablet Take 2 tablets (650 mg) by mouth every 4 hours as needed for mild pain. 50 tablet 0    acetaminophen (TYLENOL) 500 MG tablet Take 1-2 tablets (500-1,000 mg) by mouth every 6 hours as needed for mild pain 90 tablet 3    brimonidine-timolol (COMBIGAN) 0.2-0.5 % ophthalmic solution       brinzolamide (AZOPT) 1 % ophthalmic suspension       dorzolamide (TRUSOPT) 2 % ophthalmic solution       EPINEPHrine (ANY BX GENERIC EQUIV) 0.3 MG/0.3ML injection 2-pack Inject 0.3 mLs (0.3 mg) into the muscle once as needed for anaphylaxis May repeat one time in 5-15 minutes if response to initial dose is inadequate. 2 each 0    glipiZIDE (GLUCOTROL XL) 5 MG 24 hr tablet Take 1 tablet (5 mg) by mouth daily. 90 tablet 2    ibuprofen (ADVIL/MOTRIN) 600 MG tablet Take 1 tablet (600 mg) by mouth every 6 hours as needed for other (mild and/or inflammatory pain). 30 tablet 0    ibuprofen (ADVIL/MOTRIN) 600 MG tablet Take 1 tablet (600 mg) by mouth every 6 hours as needed for moderate pain 90 tablet 3    metFORMIN (GLUCOPHAGE XR) 500 MG 24 hr tablet TAKE 2 TABLETS(1000 MG) BY MOUTH TWICE DAILY WITH MEALS 360 tablet 0    oxyCODONE (ROXICODONE) 5 MG tablet Take 1 tablet (5 mg) by mouth every 4 hours as needed for moderate to severe pain. 5 tablet 0    prednisoLONE acetate  (PRED FORTE) 1 % ophthalmic suspension          Allergies   Allergen Reactions    Chloroquine Itching    Invokana [Canagliflozin]     Lisinopril Angioedema     Likely ACEI angioedema    Pyrimethamine Blisters    Sodium Fluoride Other (See Comments)     Contact Dermatitis - Pt states that he is allergic to a Phosphate medication, he got blisters    Statins [Statins] Muscle Pain (Myalgia)     Both atorvastatin and pravastatin    Sulfa Antibiotics Itching    Latex Rash       Kerline Martell, ATC

## 2024-10-23 NOTE — LETTER
10/23/2024      Kurtis Senior  [address] [place] [address]      Dear Colleague,    Thank you for referring your patient, Kurtis Senior, to the [facility] [place]. Please see a copy of my visit note below.    Kurtis is here for follow-up of his right carpal tunnel release.  He is doing well.  Less numbness and tingling.  No fevers or chills.      The past medical history was reviewed and documented in the chart.  This includes medications, surgeries, social history as well as review of systems.      Physical examination demonstrates mild tenderness over the incision.  His motion is a little bit limited of the digits on exam today, cannot quite make a full fist.  Although he has full sensation on the median nerve distribution and APB is 5 out of 5.  Scar is somewhat tender.    Impression: Status post right carpal tunnel release    Plan: Light bandage was applied.  We talked about home exercise program.  He was given a note to be out of work.  I will see him back in 3 weeks, likely return to work at that point.    Again, thank you for allowing me to participate in the care of your patient.        Sincerely,        Praful Bhagat MD

## 2024-10-23 NOTE — LETTER
[facility] [address]  [address] [facility] [address]  [phone]          October 23, 2024    RE:  Kurtis Senior                                                                                                                                                       [address] [place] [address]            To whom it may concern:    Kurtis Senior is under my professional care for left carpal tunnel release with a surgery date of 10/16/24. He may NOT return to work for the next 3 weeks. He has a follow up appointment on 11/13/24 at which point he will be re-evaluated and his work restrictions will be modified.        Sincerely,        Praful Bhagat MD

## 2024-10-23 NOTE — PROGRESS NOTES
Kurtis is here for follow-up of his right carpal tunnel release.  He is doing well.  Less numbness and tingling.  No fevers or chills.      The past medical history was reviewed and documented in the chart.  This includes medications, surgeries, social history as well as review of systems.      Physical examination demonstrates mild tenderness over the incision.  His motion is a little bit limited of the digits on exam today, cannot quite make a full fist.  Although he has full sensation on the median nerve distribution and APB is 5 out of 5.  Scar is somewhat tender.    Impression: Status post right carpal tunnel release    Plan: Light bandage was applied.  We talked about home exercise program.  He was given a note to be out of work.  I will see him back in 3 weeks, likely return to work at that point.

## 2024-11-13 ENCOUNTER — OFFICE VISIT (OUTPATIENT)
Dept: ORTHOPEDICS | Facility: CLINIC | Age: 59
End: 2024-11-13
Payer: OTHER MISCELLANEOUS

## 2024-11-13 DIAGNOSIS — G56.03 SEVERE CARPAL TUNNEL SYNDROME OF BOTH WRISTS: Primary | ICD-10-CM

## 2024-11-13 PROCEDURE — 99024 POSTOP FOLLOW-UP VISIT: CPT | Performed by: ORTHOPAEDIC SURGERY

## 2024-11-13 NOTE — PROGRESS NOTES
Follow-up right carpal tunnel release.  Doing well.  Much less numbness and tingling.  Minimal pain.  Still wearing the splint.      Physical examination demonstrates a nicely healing incision, no signs of infection.  Sensation is returning nicely to the median nerve distribution.    Impression: Status post right carpal tunnel release    Plan: He was given a note to return to light duty work.  He can wean out of the splint.  He can wash and bathe the hand as tolerated, he can use the hand as tolerated.  I will see him back in a month.  Would anticipate return to full duty work at that point.

## 2024-11-13 NOTE — NURSING NOTE
Reason For Visit:   Chief Complaint   Patient presents with    Surgical Followup     DOS: 10/16/2024 - Release,right Carpal Tunnel. Patient still experiencing numbness, tingling and stiffness        Primary MD: Iron Reinoso  Ref. MD: Blaze     Age: 58 year old    ?  No      There were no vitals taken for this visit.      Pain Assessment  Patient Currently in Pain: Yes  Patient's Stated Pain Goal: 6  0-10 Pain Scale: 6  Primary Pain Location: Wrist (Right)  Pain Descriptors: Tightness, Numbness, Sore               QuickDASH Assessment       No data to display                   Current Outpatient Medications   Medication Sig Dispense Refill    acetaminophen (TYLENOL) 325 MG tablet Take 2 tablets (650 mg) by mouth every 4 hours as needed for mild pain. 50 tablet 0    acetaminophen (TYLENOL) 500 MG tablet Take 1-2 tablets (500-1,000 mg) by mouth every 6 hours as needed for mild pain 90 tablet 3    brimonidine-timolol (COMBIGAN) 0.2-0.5 % ophthalmic solution       brinzolamide (AZOPT) 1 % ophthalmic suspension       dorzolamide (TRUSOPT) 2 % ophthalmic solution       EPINEPHrine (ANY BX GENERIC EQUIV) 0.3 MG/0.3ML injection 2-pack Inject 0.3 mLs (0.3 mg) into the muscle once as needed for anaphylaxis May repeat one time in 5-15 minutes if response to initial dose is inadequate. 2 each 0    glipiZIDE (GLUCOTROL XL) 5 MG 24 hr tablet Take 1 tablet (5 mg) by mouth daily. 90 tablet 2    ibuprofen (ADVIL/MOTRIN) 600 MG tablet Take 1 tablet (600 mg) by mouth every 6 hours as needed for other (mild and/or inflammatory pain). 30 tablet 0    ibuprofen (ADVIL/MOTRIN) 600 MG tablet Take 1 tablet (600 mg) by mouth every 6 hours as needed for moderate pain 90 tablet 3    metFORMIN (GLUCOPHAGE XR) 500 MG 24 hr tablet TAKE 2 TABLETS(1000 MG) BY MOUTH TWICE DAILY WITH MEALS 360 tablet 0    oxyCODONE (ROXICODONE) 5 MG tablet Take 1 tablet (5 mg) by mouth every 4 hours as needed for moderate to severe pain. 5 tablet 0     prednisoLONE acetate (PRED FORTE) 1 % ophthalmic suspension          Allergies   Allergen Reactions    Chloroquine Itching    Invokana [Canagliflozin]     Lisinopril Angioedema     Likely ACEI angioedema    Pyrimethamine Blisters    Sodium Fluoride Other (See Comments)     Contact Dermatitis - Pt states that he is allergic to a Phosphate medication, he got blisters    Statins [Statins] Muscle Pain (Myalgia)     Both atorvastatin and pravastatin    Sulfa Antibiotics Itching    Latex Rash       Steve Reynoso, CMA

## 2024-11-22 ENCOUNTER — TELEPHONE (OUTPATIENT)
Dept: ORTHOPEDICS | Facility: CLINIC | Age: 59
End: 2024-11-22
Payer: MEDICAID

## 2024-11-22 NOTE — TELEPHONE ENCOUNTER
[facility] Call Center    Phone Message    May a detailed message be left on voicemail: yes     Reason for Call: Other: There is a small open wound at the end of the incision and patient is worried about an infection. Patient would like a call back.     Action Taken: Message routed to:  [facility] ([facility] Orthopedics    Travel Screening: Not Applicable     Date of Service:

## 2024-11-25 NOTE — TELEPHONE ENCOUNTER
Called patient and LVM inquiring about his right wrist incision. I have asked the patient to attach a photo if able so team can evaluate it and direct him as needed. Clinic call back number was also provided should he have additional questions and concerns.

## 2024-12-11 ENCOUNTER — OFFICE VISIT (OUTPATIENT)
Dept: ORTHOPEDICS | Facility: CLINIC | Age: 59
End: 2024-12-11
Payer: MEDICAID

## 2024-12-11 ENCOUNTER — TELEPHONE (OUTPATIENT)
Dept: ORTHOPEDICS | Facility: CLINIC | Age: 59
End: 2024-12-11

## 2024-12-11 DIAGNOSIS — E11.9 TYPE 2 DIABETES MELLITUS WITHOUT COMPLICATION, WITHOUT LONG-TERM CURRENT USE OF INSULIN (H): ICD-10-CM

## 2024-12-11 DIAGNOSIS — G56.03 SEVERE CARPAL TUNNEL SYNDROME OF BOTH WRISTS: Primary | ICD-10-CM

## 2024-12-11 DIAGNOSIS — Z98.890 POST-OPERATIVE STATE: ICD-10-CM

## 2024-12-11 PROCEDURE — 99024 POSTOP FOLLOW-UP VISIT: CPT | Performed by: PHYSICIAN ASSISTANT

## 2024-12-11 RX ORDER — METFORMIN HYDROCHLORIDE 500 MG/1
1000 TABLET, EXTENDED RELEASE ORAL 2 TIMES DAILY WITH MEALS
Qty: 360 TABLET | Refills: 0 | OUTPATIENT
Start: 2024-12-11

## 2024-12-11 NOTE — NURSING NOTE
Reason For Visit:   Chief Complaint   Patient presents with    Surgical Followup     Post op Release,right Carpal Tunnel - Right. DOS: 10/16/24       Primary MD: Iron Reinoso  Ref. MD: Blaze    Age: 59 year old    ?  No      There were no vitals taken for this visit.      Pain Assessment  Patient Currently in Pain: No  Primary Pain Location:  (Right wrist and hand)    Hand Dominance Evaluation  Hand Dominance: Right          QuickDASH Assessment       No data to display                   Current Outpatient Medications   Medication Sig Dispense Refill    acetaminophen (TYLENOL) 325 MG tablet Take 2 tablets (650 mg) by mouth every 4 hours as needed for mild pain. 50 tablet 0    acetaminophen (TYLENOL) 500 MG tablet Take 1-2 tablets (500-1,000 mg) by mouth every 6 hours as needed for mild pain 90 tablet 3    brimonidine-timolol (COMBIGAN) 0.2-0.5 % ophthalmic solution       brinzolamide (AZOPT) 1 % ophthalmic suspension       dorzolamide (TRUSOPT) 2 % ophthalmic solution       EPINEPHrine (ANY BX GENERIC EQUIV) 0.3 MG/0.3ML injection 2-pack Inject 0.3 mLs (0.3 mg) into the muscle once as needed for anaphylaxis May repeat one time in 5-15 minutes if response to initial dose is inadequate. 2 each 0    glipiZIDE (GLUCOTROL XL) 5 MG 24 hr tablet Take 1 tablet (5 mg) by mouth daily. 90 tablet 2    ibuprofen (ADVIL/MOTRIN) 600 MG tablet Take 1 tablet (600 mg) by mouth every 6 hours as needed for other (mild and/or inflammatory pain). 30 tablet 0    ibuprofen (ADVIL/MOTRIN) 600 MG tablet Take 1 tablet (600 mg) by mouth every 6 hours as needed for moderate pain 90 tablet 3    metFORMIN (GLUCOPHAGE XR) 500 MG 24 hr tablet TAKE 2 TABLETS(1000 MG) BY MOUTH TWICE DAILY WITH MEALS 360 tablet 0    oxyCODONE (ROXICODONE) 5 MG tablet Take 1 tablet (5 mg) by mouth every 4 hours as needed for moderate to severe pain. 5 tablet 0    prednisoLONE acetate (PRED FORTE) 1 % ophthalmic suspension          Allergies   Allergen  Reactions    Chloroquine Itching    Invokana [Canagliflozin]     Lisinopril Angioedema     Likely ACEI angioedema    Pyrimethamine Blisters    Sodium Fluoride Other (See Comments)     Contact Dermatitis - Pt states that he is allergic to a Phosphate medication, he got blisters    Statins [Statins] Muscle Pain (Myalgia)     Both atorvastatin and pravastatin    Sulfa Antibiotics Itching    Latex Rash       Kerline Martell, ATC

## 2024-12-11 NOTE — LETTER
12/11/2024      Kurtis Senior  [address] [place] [address]      Dear Colleague,    Thank you for referring your patient, Kurtis Senior, to the [facility] [place]. Please see a copy of my visit note below.    Date of Service: Dec 11, 2024    Reason for visit: Postoperative follow-up    Date of Surgery: 10/16/24    Procedure Performed: Right open carpal tunnel release    Interval events: Kurtis Senior comes to see me in follow-up today. Numbness and tingling present preoperatively IS improved. No fevers or chills. No longer taking pain medication.  Wound is well-healed.  He has been wearing the wrist brace for protection.  He notes some soreness over the surgical incision.  He has been on light duty at work.  Patient reports that his left hand has improved and he like to wait on surgery at this time.    Physical examination:  Well-developed, well-nourished and in no acute distress.  Alert and oriented to surroundings.  On examination of the right wrist, incision is well-healed. There is no erythema, drainage, or dehiscence. Sensation is intact in median, radial and ulnar nerve distributions. Thumb opposition is intact.  Mild pillar pain.  Patient can actively flex and extend all digits and thumb. Interosseous muscles, EPL, and FDP-2 fire. Fingers are warm and well-perfused.     Assessment: Kurtis Senior is a 51-year-old male with bilateral carpal tunnel syndrome, status post right open carpal tunnel release.    Plan: We discussed that the wound is fully healed.  He can progress activities as tolerated.  He should wean out of the brace.  Okay to return to work without restrictions.  Work note provided.  Also gentle range of motion, scar massage.    For his left hand, recommended night time splinting.  Should he wish to proceed with a corticosteroid injection or surgical release he will contact us.  All questions were answered and the patient was agreed with the plan.    CELSO  JULES WARREN  Orthopaedic Surgery     Again, thank you for allowing me to participate in the care of your patient.        Sincerely,        Cinda Warren PA-C

## 2024-12-11 NOTE — LETTER
December 11, 2024      RE: Kurtis Senior  [address] [place] [address]        To whom it may concern:    Kurtis Senior is under my professional care following a right carpal tunnel release.  He  may return to work without restrictions. May wear a right wrist brace as needed for comfort.       Sincerely,  Cinda Richey PA-C

## 2024-12-11 NOTE — PROGRESS NOTES
Date of Service: Dec 11, 2024    Reason for visit: Postoperative follow-up    Date of Surgery: 10/16/24    Procedure Performed: Right open carpal tunnel release    Interval events: Kurtis Senior comes to see me in follow-up today. Numbness and tingling present preoperatively IS improved. No fevers or chills. No longer taking pain medication.  Wound is well-healed.  He has been wearing the wrist brace for protection.  He notes some soreness over the surgical incision.  He has been on light duty at work.  Patient reports that his left hand has improved and he like to wait on surgery at this time.    Physical examination:  Well-developed, well-nourished and in no acute distress.  Alert and oriented to surroundings.  On examination of the right wrist, incision is well-healed. There is no erythema, drainage, or dehiscence. Sensation is intact in median, radial and ulnar nerve distributions. Thumb opposition is intact.  Mild pillar pain.  Patient can actively flex and extend all digits and thumb. Interosseous muscles, EPL, and FDP-2 fire. Fingers are warm and well-perfused.     Assessment: Kurtis Senior is a 51-year-old male with bilateral carpal tunnel syndrome, status post right open carpal tunnel release.    Plan: We discussed that the wound is fully healed.  He can progress activities as tolerated.  He should wean out of the brace.  Okay to return to work without restrictions.  Work note provided.  Also gentle range of motion, scar massage.    For his left hand, recommended night time splinting.  Should he wish to proceed with a corticosteroid injection or surgical release he will contact us.  All questions were answered and the patient was agreed with the plan.    CELSO WARREN PA-C  Orthopaedic Surgery

## 2024-12-30 ENCOUNTER — MYC REFILL (OUTPATIENT)
Dept: FAMILY MEDICINE | Facility: CLINIC | Age: 59
End: 2024-12-30
Payer: MEDICAID

## 2024-12-30 DIAGNOSIS — E11.9 TYPE 2 DIABETES MELLITUS WITHOUT COMPLICATION, WITHOUT LONG-TERM CURRENT USE OF INSULIN (H): ICD-10-CM

## 2024-12-30 RX ORDER — METFORMIN HYDROCHLORIDE 500 MG/1
1000 TABLET, EXTENDED RELEASE ORAL 2 TIMES DAILY WITH MEALS
Qty: 360 TABLET | Refills: 0 | OUTPATIENT
Start: 2024-12-30

## 2024-12-30 RX ORDER — GLIPIZIDE 5 MG/1
5 TABLET, FILM COATED, EXTENDED RELEASE ORAL DAILY
Qty: 90 TABLET | Refills: 2 | OUTPATIENT
Start: 2024-12-30

## 2025-01-09 ENCOUNTER — TELEPHONE (OUTPATIENT)
Dept: ORTHOPEDICS | Facility: CLINIC | Age: 60
End: 2025-01-09
Payer: MEDICAID

## 2025-01-09 NOTE — TELEPHONE ENCOUNTER
ATC returned patient phone call and schedule patient with Dr Bhagat on 1/29/25 at 9:40am.    Patient was very satisfied.      MARY CRAMER, ATC

## 2025-01-09 NOTE — TELEPHONE ENCOUNTER
Other: Patient is calling regarding R wrist pain, would like to speak to care team about recommendation since Dr. Bhagat is booked out to March      Could we send this information to you in [place] or would you prefer to receive a phone call?:   Patient would prefer a phone call   Okay to leave a detailed message?: Yes at Cell number on file:    Telephone Information:   Mobile [phone]

## 2025-01-29 ENCOUNTER — OFFICE VISIT (OUTPATIENT)
Dept: ORTHOPEDICS | Facility: CLINIC | Age: 60
End: 2025-01-29
Payer: OTHER MISCELLANEOUS

## 2025-01-29 DIAGNOSIS — G56.03 SEVERE CARPAL TUNNEL SYNDROME OF BOTH WRISTS: Primary | ICD-10-CM

## 2025-01-29 RX ORDER — LIDOCAINE HYDROCHLORIDE 10 MG/ML
0.5 INJECTION, SOLUTION EPIDURAL; INFILTRATION; INTRACAUDAL; PERINEURAL
Status: COMPLETED | OUTPATIENT
Start: 2025-01-29 | End: 2025-01-29

## 2025-01-29 RX ADMIN — LIDOCAINE HYDROCHLORIDE 0.5 ML: 10 INJECTION, SOLUTION EPIDURAL; INFILTRATION; INTRACAUDAL; PERINEURAL at 10:34

## 2025-01-29 NOTE — PROGRESS NOTES
Hand / Upper Extremity Injection/Arthrocentesis    Date/Time: 2025 10:34 AM    Performed by: Praful Bhagat MD  Authorized by: Praful Bhagat MD    Indications:  Pain  Needle Size:  27 G  Guidance: landmark     Condition comment:  Right wrist scar    Medications:  10 mg triamcinolone acetonide 10 MG/ML; 0.5 mL lidocaine (PF) 1 %  Outcome:  Tolerated well, no immediate complications  Procedure discussed: discussed risks, benefits, and alternatives    Consent Given by:  Patient  Timeout: timeout called immediately prior to procedure    Prep: patient was prepped and draped in usual sterile fashion        [facility] [address]  [address] [facility] [id number] [phone]  Dept: [phone]  ______________________________________________________________________________    Patient: Kurtis Senior   : 1965   MRN: [id number]   2025    INVASIVE PROCEDURE SAFETY CHECKLIST    Date: 2025   Procedure:Right wrist scar cortisone injection  Patient Name: Kurtis Senior  MRN: [id number]  YOB: 1965    Action: Complete sections as appropriate. Any discrepancy results in a HARD COPY until resolved.     PRE PROCEDURE:  Patient ID verified with 2 identifiers (name and  or MRN): Yes  Procedure and site verified with patient/designee (when able): Yes  Accurate consent documentation in medical record: Yes  H&P (or appropriate assessment) documented in medical record: Yes  H&P must be up to 20 days prior to procedure and updates within 24 hours of procedure as applicable: Yes  Relevant diagnostic and radiology test results appropriately labeled and displayed as applicable: NA  Procedure site(s) marked with provider initials: NA    TIMEOUT:  Time-Out performed immediately prior to starting procedure, including verbal and active participation of all team members addressing the following:Yes  * Correct patient identify  *  Confirmed that the correct side and site are marked  * An accurate procedure consent form  * Agreement on the procedure to be done  * Correct patient position  * Relevant images and results are properly labeled and appropriately displayed  * The need to administer antibiotics or fluids for irrigation purposes during the procedure as applicable   * Safety precautions based on patient history or medication use    DURING PROCEDURE: Verification of correct person, site, and procedures any time the responsibility for care of the patient is transferred to another member of the care team.       The following medications were given:         Prior to injection, verified patient identity using patient's name and date of birth.  Due to injection administration, patient instructed to remain in clinic for 15 minutes  afterwards, and to report any adverse reaction to me immediately.    Scar injection  Medication Name: Kenalopg -10 NDC 0368-5947-40  Drug Amount Wasted:  Yes: 40 mg/ml   Vial/Syringe: Single dose vial  Expiration Date:  4/1/27    Medication Name Lidocaine 1% NDC 82664-189-69    Scribed by MARY CRAMER, DEDRA for Dr. Bhagat on January 29, 2025 at 10:38am based on the provider's statements to me.     MARY CRAMER, ATC

## 2025-01-29 NOTE — LETTER
1/29/2025      Kurtis Senior  [address] [place] [address]      Dear Colleague,    Thank you for referring your patient, Kurtis Senior, to the [facility] [place]. Please see a copy of my visit note below.    Kurtis is here for his right hand, status post carpal tunnel release.  No further numbness or tingling.  The scar remains a little bit tender and sore though.  He is working, his normal job.  No fevers, no chills.  No recent trauma.  Left hand is improving, less numbness and tingling.    The past medical history was reviewed and documented in the chart.  This includes medications, surgeries, social history as well as review of systems.    Physical examination of his right hand demonstrates a well-healed scar but some scar tenderness noted and some induration.  No signs of infection.  Thumb CMC is mildly tender as well.  Bilaterally, no motor, no sensory deficits are noted.  No significant atrophy.  There is brisk capillary refill in all digits and a palpable radial pulse.  No overlying skin changes noted.    Impression: Status post right carpal tunnel release, scar pain/induration, mild left carpal tunnel syndrome    Plan: We talked about treatment options, no further surgical indications.  I think we can leave his left carpal tunnel alone for now and monitor that.  We can get him a silicone scar pad for the incision.  He can use as needed.  We also discussed the cortisone injection for the right wrist and he consented to proceed.  After sterile prep the volar right wrist in the area of the scar was injected with 1/2 cc of 1% lidocaine and 1 cc of Kenalog 10.  He tolerated the injection without any difficulty.  The wound was dressed with a light dressing.  Follow-up as needed    Hand / Upper Extremity Injection/Arthrocentesis    Date/Time: 1/29/2025 10:34 AM    Performed by: Praful Bhagat MD  Authorized by: Praful Bhagat MD    Indications:  Pain  Needle Size:   27 G  Guidance: landmark     Condition comment:  Right wrist scar    Medications:  10 mg triamcinolone acetonide 10 MG/ML; 0.5 mL lidocaine (PF) 1 %  Outcome:  Tolerated well, no immediate complications  Procedure discussed: discussed risks, benefits, and alternatives    Consent Given by:  Patient  Timeout: timeout called immediately prior to procedure    Prep: patient was prepped and draped in usual sterile fashion        [facility] [address]  [address] [facility] [id number] [phone]  Dept: [phone]  ______________________________________________________________________________    Patient: Kurtis Senior   : 1965   MRN: [id number]   2025    INVASIVE PROCEDURE SAFETY CHECKLIST    Date: 2025   Procedure:Right wrist scar cortisone injection  Patient Name: Kurtis Senior  MRN: [id number]  YOB: 1965    Action: Complete sections as appropriate. Any discrepancy results in a HARD COPY until resolved.     PRE PROCEDURE:  Patient ID verified with 2 identifiers (name and  or MRN): Yes  Procedure and site verified with patient/designee (when able): Yes  Accurate consent documentation in medical record: Yes  H&P (or appropriate assessment) documented in medical record: Yes  H&P must be up to 20 days prior to procedure and updates within 24 hours of procedure as applicable: Yes  Relevant diagnostic and radiology test results appropriately labeled and displayed as applicable: NA  Procedure site(s) marked with provider initials: NA    TIMEOUT:  Time-Out performed immediately prior to starting procedure, including verbal and active participation of all team members addressing the following:Yes  * Correct patient identify  * Confirmed that the correct side and site are marked  * An accurate procedure consent form  * Agreement on the procedure to be done  * Correct patient position  * Relevant images and results are properly  labeled and appropriately displayed  * The need to administer antibiotics or fluids for irrigation purposes during the procedure as applicable   * Safety precautions based on patient history or medication use    DURING PROCEDURE: Verification of correct person, site, and procedures any time the responsibility for care of the patient is transferred to another member of the care team.       The following medications were given:         Prior to injection, verified patient identity using patient's name and date of birth.  Due to injection administration, patient instructed to remain in clinic for 15 minutes  afterwards, and to report any adverse reaction to me immediately.    Scar injection  Medication Name: Anandalopg -10 NDC 5302-8275-53  Drug Amount Wasted:  Yes: 40 mg/ml   Vial/Syringe: Single dose vial  Expiration Date:  4/1/27    Medication Name Lidocaine 1% NDC 45623-187-69    Scribed by MARY CRAMER, DEDRA for Dr. Bhagat on January 29, 2025 at 10:38am based on the provider's statements to me.     MARY CRAMER ATC      Again, thank you for allowing me to participate in the care of your patient.        Sincerely,        Praful Bhagat MD    Electronically signed

## 2025-01-29 NOTE — PROGRESS NOTES
Kurtis is here for his right hand, status post carpal tunnel release.  No further numbness or tingling.  The scar remains a little bit tender and sore though.  He is working, his normal job.  No fevers, no chills.  No recent trauma.  Left hand is improving, less numbness and tingling.    The past medical history was reviewed and documented in the chart.  This includes medications, surgeries, social history as well as review of systems.    Physical examination of his right hand demonstrates a well-healed scar but some scar tenderness noted and some induration.  No signs of infection.  Thumb CMC is mildly tender as well.  Bilaterally, no motor, no sensory deficits are noted.  No significant atrophy.  There is brisk capillary refill in all digits and a palpable radial pulse.  No overlying skin changes noted.    Impression: Status post right carpal tunnel release, scar pain/induration, mild left carpal tunnel syndrome    Plan: We talked about treatment options, no further surgical indications.  I think we can leave his left carpal tunnel alone for now and monitor that.  We can get him a silicone scar pad for the incision.  He can use as needed.  We also discussed the cortisone injection for the right wrist and he consented to proceed.  After sterile prep the volar right wrist in the area of the scar was injected with 1/2 cc of 1% lidocaine and 1 cc of Kenalog 10.  He tolerated the injection without any difficulty.  The wound was dressed with a light dressing.  Follow-up as needed

## 2025-01-29 NOTE — NURSING NOTE
Reason For Visit:   Chief Complaint   Patient presents with    RECHECK     Follow-up Release,right Carpal Tunnel - Right. DOS: 10/16/24       Primary MD: Iron Reinoso  Ref. MD: Blaze    Age: 59 year old    ?  No      There were no vitals taken for this visit.      Pain Assessment  Patient Currently in Pain: Yes  0-10 Pain Scale: 5  Primary Pain Location: Hand (Right hand)  Pain Descriptors: Sore, Constant (Incision site sensitivity and intermittent swelling)    Hand Dominance Evaluation  Hand Dominance: Right          QuickDASH Assessment       No data to display                   Current Outpatient Medications   Medication Sig Dispense Refill    acetaminophen (TYLENOL) 325 MG tablet Take 2 tablets (650 mg) by mouth every 4 hours as needed for mild pain. 50 tablet 0    acetaminophen (TYLENOL) 500 MG tablet Take 1-2 tablets (500-1,000 mg) by mouth every 6 hours as needed for mild pain 90 tablet 3    brimonidine-timolol (COMBIGAN) 0.2-0.5 % ophthalmic solution       brinzolamide (AZOPT) 1 % ophthalmic suspension       dorzolamide (TRUSOPT) 2 % ophthalmic solution       EPINEPHrine (ANY BX GENERIC EQUIV) 0.3 MG/0.3ML injection 2-pack Inject 0.3 mLs (0.3 mg) into the muscle once as needed for anaphylaxis May repeat one time in 5-15 minutes if response to initial dose is inadequate. 2 each 0    glipiZIDE (GLUCOTROL XL) 5 MG 24 hr tablet Take 1 tablet (5 mg) by mouth daily. 90 tablet 2    ibuprofen (ADVIL/MOTRIN) 600 MG tablet Take 1 tablet (600 mg) by mouth every 6 hours as needed for other (mild and/or inflammatory pain). 30 tablet 0    ibuprofen (ADVIL/MOTRIN) 600 MG tablet Take 1 tablet (600 mg) by mouth every 6 hours as needed for moderate pain 90 tablet 3    metFORMIN (GLUCOPHAGE XR) 500 MG 24 hr tablet TAKE 2 TABLETS(1000 MG) BY MOUTH TWICE DAILY WITH MEALS 360 tablet 0    oxyCODONE (ROXICODONE) 5 MG tablet Take 1 tablet (5 mg) by mouth every 4 hours as needed for moderate to severe pain. 5 tablet 0     prednisoLONE acetate (PRED FORTE) 1 % ophthalmic suspension          Allergies   Allergen Reactions    Chloroquine Itching    Invokana [Canagliflozin]     Lisinopril Angioedema     Likely ACEI angioedema    Pyrimethamine Blisters    Sodium Fluoride Other (See Comments)     Contact Dermatitis - Pt states that he is allergic to a Phosphate medication, he got blisters    Statins [Statins] Muscle Pain (Myalgia)     Both atorvastatin and pravastatin    Sulfa Antibiotics Itching    Latex Rash       Kerline Martell, ATC

## 2025-03-09 ENCOUNTER — MYC REFILL (OUTPATIENT)
Dept: FAMILY MEDICINE | Facility: CLINIC | Age: 60
End: 2025-03-09
Payer: MEDICAID

## 2025-03-09 DIAGNOSIS — E11.9 TYPE 2 DIABETES MELLITUS WITHOUT COMPLICATION, WITHOUT LONG-TERM CURRENT USE OF INSULIN (H): ICD-10-CM

## 2025-03-10 RX ORDER — METFORMIN HYDROCHLORIDE 500 MG/1
1000 TABLET, EXTENDED RELEASE ORAL 2 TIMES DAILY WITH MEALS
Qty: 360 TABLET | Refills: 0 | Status: SHIPPED | OUTPATIENT
Start: 2025-03-10

## 2025-03-15 ENCOUNTER — HEALTH MAINTENANCE LETTER (OUTPATIENT)
Age: 60
End: 2025-03-15

## 2025-03-26 ENCOUNTER — OFFICE VISIT (OUTPATIENT)
Dept: ORTHOPEDICS | Facility: CLINIC | Age: 60
End: 2025-03-26

## 2025-03-26 DIAGNOSIS — G56.02 CARPAL TUNNEL SYNDROME OF LEFT WRIST: Primary | ICD-10-CM

## 2025-03-26 PROCEDURE — 99213 OFFICE O/P EST LOW 20 MIN: CPT | Mod: GC | Performed by: ORTHOPAEDIC SURGERY

## 2025-03-26 NOTE — LETTER
3/26/2025      Kurtis Senior  [address] [place] [address]      Dear Colleague,    Thank you for referring your patient, Kurtis Senior, to the [facility] [place]. Please see a copy of my visit note below.    ORTHOPEDIC HAND SURGERY FOLLOW-UP    LAST ENCOUNTER: 2025  Last visit patient was seen after undergoing a right carpal tunnel release, he was having some luis antonio-incisional tenderness and at that time mild left carpal tunnel syndrome thus a left carpal tunnel injection was provided.    HISTORY OF PRESENT ILLNESS:  Kurtis Senior is a 59 year old right-hand dominant male who presents for follow-up after undergoing right carpal tunnel release and to discuss left carpal tunnel syndrome symptoms.    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  He reports that the right hand numbness that would wake him up at night has resolved.  He reports that the left hand is now more bothersome than the right.  The carpal tunnel injection that he got at the last visit helped significantly with some of the night time symptoms but he reports that the effects are starting to wear off and he is interested in undergoing a carpal tunnel release.    PHYSICAL EXAM:  Gen: awake, alert, no acute distress  Resp: NLB on RA  CV: Skin wwp  Focused exam of the right extremity demonstrates well-healed surgical incision, mild tenderness palpation over the incision/thenars.  There appears to be some reaction to the Monocryl suture that was used.    Focused exam of the left extremity demonstrates 4/5 index finger strength with flexion, diminished sensation in the median nerve, positive Durkan compression test, positive Phalen's test, positive Tinel.  Intact sensation in the ulnar nerve distribution.  Fingers are warm well-perfused.  2+ radial pulse.    EM2024  Right median neuropathy at or distal to the wrist consistent  with a carpal tunnel syndrome severe in degree electrophysiologically  Left median neuropathy at or distal to  the wrist consistent with a carpal tunnel syndrome severe in degree electrophysiologically  Right C7 motor radiculopathy mild in degree electrophysiologically with both active and chronic denervation seen.  Right ulnar distribution mild chronic motor unit changes, ulnar distribution question none localizable mild ulnar neuropathy  Left C7 motor radiculopathy mild in degree electrophysiologically chronic in nature.  Left ulnar distribution mild chronic motor changes, ulnar distribution question 9 localizable mild ulnar neuropathy    ASSESSMENT:  59-year-old male with left carpal tunnel syndrome and history of right carpal tunnel syndrome status post carpal tunnel release.    Given the patient's history and physical are both consistent with carpal tunnel syndrome and his EMGs demonstrate severe carpal tunnel syndrome at the wrist bilaterally in addition to undergoing a prior carpal tunnel with significant relief of his symptoms on the right side and a recent left-sided carpal tunnel injection that has relieved his symptoms, it is most likely that carpal tunnel syndrome is the diagnosis therefore we discussed carpal tunnel release of the left transverse retinacular ligament.  The patient was amenable and would like to proceed.  We will schedule this as a same-day surgery in the procedure room and given the patient reacted to the Monocryl sutures, we will use nylons.    PLAN:  -Okay to schedule for left-sided carpal tunnel release in the procedure room ([facility] CLOSURE)    The patient was seen and discussed with Dr. Bhagat.    Voice-to-text dictation software was utilized in the creation of this note therefore there may be unintended word substitutions, although errors are generally corrected real-time, there is the potential for a rare error to be present in the completed chart. Please do not hesitate to reach  out for clarification.    Praful Shaikh MD  Orthopaedic Surgery Resident  03/26/25      Attestation signed by Praful Bhagat MD at 3/26/2025 11:39 AM:  Patient was seen and examined with the resident.  I agree with the assessment and plan of care.        Again, thank you for allowing me to participate in the care of your patient.        Sincerely,        Praful Bhagat MD    Electronically signed

## 2025-03-26 NOTE — NURSING NOTE
Reason For Visit:   Chief Complaint   Patient presents with    RECHECK     Follow-up Left wrist carpal tunnel syndrome        Primary MD: Iron Reinoso  Ref. MD: Blaze    Age: 59 year old    ?  No      There were no vitals taken for this visit.      Pain Assessment  Patient Currently in Pain: Yes  Primary Pain Location: Wrist (Left)  Pain Descriptors: Tingling, Numbness    Hand Dominance Evaluation  Hand Dominance: Right          QuickDASH Assessment       No data to display                   Current Outpatient Medications   Medication Sig Dispense Refill    acetaminophen (TYLENOL) 325 MG tablet Take 2 tablets (650 mg) by mouth every 4 hours as needed for mild pain. 50 tablet 0    acetaminophen (TYLENOL) 500 MG tablet Take 1-2 tablets (500-1,000 mg) by mouth every 6 hours as needed for mild pain 90 tablet 3    brimonidine-timolol (COMBIGAN) 0.2-0.5 % ophthalmic solution       brinzolamide (AZOPT) 1 % ophthalmic suspension       dorzolamide (TRUSOPT) 2 % ophthalmic solution       EPINEPHrine (ANY BX GENERIC EQUIV) 0.3 MG/0.3ML injection 2-pack Inject 0.3 mLs (0.3 mg) into the muscle once as needed for anaphylaxis May repeat one time in 5-15 minutes if response to initial dose is inadequate. 2 each 0    glipiZIDE (GLUCOTROL XL) 5 MG 24 hr tablet Take 1 tablet (5 mg) by mouth daily. 90 tablet 2    ibuprofen (ADVIL/MOTRIN) 600 MG tablet Take 1 tablet (600 mg) by mouth every 6 hours as needed for other (mild and/or inflammatory pain). 30 tablet 0    ibuprofen (ADVIL/MOTRIN) 600 MG tablet Take 1 tablet (600 mg) by mouth every 6 hours as needed for moderate pain 90 tablet 3    metFORMIN (GLUCOPHAGE XR) 500 MG 24 hr tablet Take 2 tablets (1,000 mg) by mouth 2 times daily (with meals). 360 tablet 0    oxyCODONE (ROXICODONE) 5 MG tablet Take 1 tablet (5 mg) by mouth every 4 hours as needed for moderate to severe pain. 5 tablet 0    prednisoLONE acetate (PRED FORTE) 1 % ophthalmic suspension          Allergies    Allergen Reactions    Chloroquine Itching    Invokana [Canagliflozin]     Lisinopril Angioedema     Likely ACEI angioedema    Pyrimethamine Blisters    Sodium Fluoride Other (See Comments)     Contact Dermatitis - Pt states that he is allergic to a Phosphate medication, he got blisters    Statins [Statins] Muscle Pain (Myalgia)     Both atorvastatin and pravastatin    Sulfa Antibiotics Itching    Latex Rash       Kerline Martell, ATC

## 2025-03-26 NOTE — NURSING NOTE
Teaching Flowsheet     Visit Type: In Clinic    Time Start: 1109   Time End: 1119  Total Time Spent: 10 min.    Surgeon: Dr Praful Bhagat  Location of Surgery (known or anticipated): [facility]   Type of Anesthesia: Local only (same as previous carpal tunnel surgery)  Worker's Compensation Procedure: No    Pertinent Medical History:  Visual impairment, HTN, DM Type 2, (HgbA1C 7.8% on 10-1-24) Mitral valve regurgitation with frequent PVC's.  Were medical conditions reviewed and appropriate for location? Yes  BMI: Overweight BMI 25-29.9 (26.42)    Relevant Diagnosis: Carpal tunnel syndrome on left  Teaching Topic: Left open carpal tunnel release    Person(s) involved in teaching:   Patient  : No.   Verified Patient's Phone Number: NO    Caregiver//  Name: Not required for local only anesthetic     Motivation Level:  Asks Questions: Yes  Eager to Learn: Yes  Cooperative: Yes  Receptive (willing/able to accept information): Yes  Any cultural factors/Mormonism beliefs that may influence understanding or compliance? No     Patient demonstrates understanding of the following:  Reason for the appointment, diagnosis and treatment plan: Yes  Knowledge of proper use of medications and conditions for which they are ordered (with special attention to potential side effects or drug interactions): Yes  Which situations necessitate calling provider and whom to contact: Yes     Teaching Concerns Addressed:   Proper use and care of medical equip, care aids, etc.: Yes  Nutritional needs and diet plan: Yes  Pain management techniques: Yes  Wound Care: Yes  How and/when to access community resources: Yes  Need for pre-op with in 30 days: N/A not required    Does patient have difficulty getting a ride to appointments (post-ops, PT/OT): No  Patient's plan after discharge: home with family or spouse     Instructional Materials Used/Given: Preoperative surgery packet, antibacterial Chlorhexidine soap. Stop Light  Tool reviewed, after-hours number provided, patient verbalized understanding, had no immediate questions.    - Important Contact Info/Phone Numbers: emphasizing clinic number [phone] and after hours number [phone]  - Map/location of surgery and follow-up appointments  - Showering instructions  - Stoplight Tool     -Next step: schedule a surgery date.    Paige Combs RN

## 2025-03-26 NOTE — PROGRESS NOTES
ORTHOPEDIC HAND SURGERY FOLLOW-UP    LAST ENCOUNTER: 2025  Last visit patient was seen after undergoing a right carpal tunnel release, he was having some luis antonio-incisional tenderness and at that time mild left carpal tunnel syndrome thus a left carpal tunnel injection was provided.    HISTORY OF PRESENT ILLNESS:  Kurtis Senior is a 59 year old right-hand dominant male who presents for follow-up after undergoing right carpal tunnel release and to discuss left carpal tunnel syndrome symptoms.    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  He reports that the right hand numbness that would wake him up at night has resolved.  He reports that the left hand is now more bothersome than the right.  The carpal tunnel injection that he got at the last visit helped significantly with some of the night time symptoms but he reports that the effects are starting to wear off and he is interested in undergoing a carpal tunnel release.    PHYSICAL EXAM:  Gen: awake, alert, no acute distress  Resp: NLB on RA  CV: Skin wwp  Focused exam of the right extremity demonstrates well-healed surgical incision, mild tenderness palpation over the incision/thenars.  There appears to be some reaction to the Monocryl suture that was used.    Focused exam of the left extremity demonstrates 4/5 index finger strength with flexion, diminished sensation in the median nerve, positive Durkan compression test, positive Phalen's test, positive Tinel.  Intact sensation in the ulnar nerve distribution.  Fingers are warm well-perfused.  2+ radial pulse.    EM2024  Right median neuropathy at or distal to the wrist consistent with a carpal tunnel syndrome severe in degree electrophysiologically  Left median neuropathy at or distal to  the wrist consistent with a carpal tunnel syndrome severe in degree electrophysiologically  Right C7 motor radiculopathy mild in  degree electrophysiologically with both active and chronic denervation seen.  Right ulnar distribution mild chronic motor unit changes, ulnar distribution question none localizable mild ulnar neuropathy  Left C7 motor radiculopathy mild in degree electrophysiologically chronic in nature.  Left ulnar distribution mild chronic motor changes, ulnar distribution question 9 localizable mild ulnar neuropathy    ASSESSMENT:  59-year-old male with left carpal tunnel syndrome and history of right carpal tunnel syndrome status post carpal tunnel release.    Given the patient's history and physical are both consistent with carpal tunnel syndrome and his EMGs demonstrate severe carpal tunnel syndrome at the wrist bilaterally in addition to undergoing a prior carpal tunnel with significant relief of his symptoms on the right side and a recent left-sided carpal tunnel injection that has relieved his symptoms, it is most likely that carpal tunnel syndrome is the diagnosis therefore we discussed carpal tunnel release of the left transverse retinacular ligament.  The patient was amenable and would like to proceed.  We will schedule this as a same-day surgery in the procedure room and given the patient reacted to the Monocryl sutures, we will use nylons.    PLAN:  -Okay to schedule for left-sided carpal tunnel release in the procedure room ([facility] CLOSURE)    The patient was seen and discussed with Dr. Bhagat.    Voice-to-text dictation software was utilized in the creation of this note therefore there may be unintended word substitutions, although errors are generally corrected real-time, there is the potential for a rare error to be present in the completed chart. Please do not hesitate to reach out for clarification.    Praful Shaikh MD  Orthopaedic Surgery Resident  03/26/25

## 2025-03-27 ENCOUNTER — TELEPHONE (OUTPATIENT)
Dept: ORTHOPEDICS | Facility: CLINIC | Age: 60
End: 2025-03-27

## 2025-03-27 NOTE — TELEPHONE ENCOUNTER
Called patient to schedule surgery with Dr. Bhagat. Message left with direct number to call back at their convenience.    Ghislaine  Perioperative   [phone]

## 2025-04-05 ENCOUNTER — HEALTH MAINTENANCE LETTER (OUTPATIENT)
Age: 60
End: 2025-04-05

## 2025-04-07 PROBLEM — G56.02 CARPAL TUNNEL SYNDROME OF LEFT WRIST: Status: ACTIVE | Noted: 2025-03-26

## 2025-04-07 NOTE — TELEPHONE ENCOUNTER
Patient is scheduled for surgery with Dr. Bhagat.     Spoke with: Patient     Date of Surgery: 5/21/25, patient also offered 5/14/25.     Location: [facility] OR     Pre op with Provider: RK     H&P: Not needed as case is local. Patient has been made aware of this as well.     Additional imaging/appointments: Patients 1 wk post op has been scheduled on 5/28/25 at 8:20.     Surgery packet: Patient received surgery packet in clinic. Let patient know address of where he will be scheduled for surgery is listed within the packet. Patient made aware arrival time for surgery is not listed within packet due to [facility] calling patient 2-5 days prior to surgery with the arrival time as well as prep instructions.      Additional comments: KR Tripp on 4/7/2025 at 2:49 PM

## 2025-05-21 ENCOUNTER — HOSPITAL ENCOUNTER (OUTPATIENT)
Facility: AMBULATORY SURGERY CENTER | Age: 60
Discharge: HOME | End: 2025-05-21
Attending: ORTHOPAEDIC SURGERY | Admitting: ORTHOPAEDIC SURGERY
Payer: OTHER MISCELLANEOUS

## 2025-05-21 VITALS
SYSTOLIC BLOOD PRESSURE: 145 MMHG | BODY MASS INDEX: 26.99 KG/M2 | HEART RATE: 58 BPM | DIASTOLIC BLOOD PRESSURE: 84 MMHG | HEIGHT: 65 IN | TEMPERATURE: 97 F | WEIGHT: 162 LBS | OXYGEN SATURATION: 99 % | RESPIRATION RATE: 18 BRPM

## 2025-05-21 DIAGNOSIS — G56.03 SEVERE CARPAL TUNNEL SYNDROME OF BOTH WRISTS: ICD-10-CM

## 2025-05-21 DIAGNOSIS — G56.03 BILATERAL CARPAL TUNNEL SYNDROME: Primary | ICD-10-CM

## 2025-05-21 DIAGNOSIS — G56.02 CARPAL TUNNEL SYNDROME OF LEFT WRIST: ICD-10-CM

## 2025-05-21 LAB — GLUCOSE BLDC GLUCOMTR-MCNC: 150 MG/DL (ref 70–99)

## 2025-05-21 PROCEDURE — 64721 CARPAL TUNNEL SURGERY: CPT | Mod: LT

## 2025-05-21 RX ORDER — AMOXICILLIN 250 MG
1-2 CAPSULE ORAL 2 TIMES DAILY
Qty: 30 TABLET | Refills: 0 | Status: SHIPPED | OUTPATIENT
Start: 2025-05-21

## 2025-05-21 RX ORDER — HYDROCODONE BITARTRATE AND ACETAMINOPHEN 5; 325 MG/1; MG/1
1 TABLET ORAL EVERY 6 HOURS PRN
Qty: 10 TABLET | Refills: 0 | Status: SHIPPED | OUTPATIENT
Start: 2025-05-21 | End: 2025-05-24

## 2025-05-21 RX ORDER — ONDANSETRON 4 MG/1
4 TABLET, ORALLY DISINTEGRATING ORAL EVERY 8 HOURS PRN
Qty: 4 TABLET | Refills: 0 | Status: SHIPPED | OUTPATIENT
Start: 2025-05-21

## 2025-05-21 RX ORDER — LIDOCAINE HYDROCHLORIDE AND EPINEPHRINE 10; 10 MG/ML; UG/ML
10 INJECTION, SOLUTION INFILTRATION; PERINEURAL ONCE
Status: DISCONTINUED | OUTPATIENT
Start: 2025-05-21 | End: 2025-05-21 | Stop reason: HOSPADM

## 2025-05-21 NOTE — DISCHARGE INSTRUCTIONS
[facility]  Home Care Following Your Procedure    Call a doctor if you have signs of infection (fever, growing tenderness at the surgery site, a large amount of drainage or bleeding, severe pain, foul-smelling drainage, redness, swelling).      Tylenol/Acetaminophen Consumption    If you feel your pain relief is insufficient, you may take Tylenol/Acetaminophen in addition to your narcotic pain medication.   Be careful not to exceed 4,000 mg of Tylenol/Acetaminophen in a 24 hour period from all sources.  If you are taking extra strength Tylenol/acetaminophen (500 mg), the maximum dose is 8 tablets in 24 hours.  If you are taking regular strength acetaminophen (325 mg), the maximum dose is 12 tablets in 24 hours.      Your doctor is:  Dr. Praful Bhagat, Orthopaedics: [phone]         Or dial [phone] and ask for the resident on call for:  Orthopaedics  For emergency care, call the:  [place]:  [phone] (TTY for hearing impaired: [phone])

## 2025-05-21 NOTE — OP NOTE
OPERATIVE REPORT    PATIENT NAME: Kurtis Senior  MRN: [id number]    DATE: 5/21/2025    PREOPERATIVE DIAGNOSIS:   1.  Left wrist carpal tunnel syndrome.    POSTOPERATIVE DIAGNOSIS:   1.  Left wrist carpal tunnel syndrome.     OPERATION:   1.  Left Praful Bhagat wrist carpal tunnel release. [id number]    SURGEON: Praful Bhagat MD     ASSISTANT(S): Tr Foss MD PGY 4    STAFF: Circulator: Nikki Vivar RN  Scrub Person: Trice Lamb    ANESTHESIA: Local    IMPLANT(S): * No implants in log *    SPECIMEN: * No specimens in log *    ESTIMATED BLOOD LOSS: < 5 mL.    FLUIDS: See anesthesia records.     URINE OUTPUT: Not applicable.  Rivas was not placed.     TOURNIQUET TIME: 0 minutes.    COMPLICATIONS: None.     INDICATIONS: Kurtis Senior is a 59 year old male who developed left carpal tunnel syndrome. The patient did not respond to conservative management and was therefore indicated for operative intervention. The risks, benefits, and alternatives were discussed with the patient.  The patient verbalized understanding of the treatment plan and an informed consent was signed.     DESCRIPTION OF PROCEDURE: The patient was taken to the operating room and placed in supine position on the operating table.  In the preoperative holding area, the left carpal tunnel area was anesthetized locally with 1% lidocaine with epinephrine.  The left upper extremity was prepped and draped in the usual sterile fashion.  A timeout was performed with all OR staff.  The patient name, MRN, operative extremity, procedure, allergies, antibiotics, DVT prophylaxis, and fire precautions/plan were reviewed, and all were in agreement.     A longitudinal incision was made in the palm in-line with the 4th ray, just radial to the hook of hamate.  The incision extended from the distal wrist crease to Blair's cardinal line. The skin and the subcutaneous tissue were sharply incised.  Dissection was carried down to the palmar fascia, which was incised  followed by release of the transverse carpal ligament.  Distally the sentinel fat pad and superficial arch were identified and proximally the dissection was carried out under direct visualization to divide the antebrachial fascia and remainder of the transverse carpal ligament. There was significant compression of the median nerve at the level of proximal transverse carpal ligament.  A complete release was confirmed and exploration of the carpal canal revealed no masses. The median nerve and its branches were intact.  The tourniquet was let down.  Hemostasis was achieved.  The wound was irrigated.  Closure was performed with 5-0 subcuticular Monocryl suture.  Sterile dressings were applied.  Capillary refill was intact < 2 seconds.      He was aroused from anesthesia and taken to the recovery room in stable condition.      All counts were correct at the end of the case.    There were no complications.      Praful Bhagat MD

## 2025-05-28 ENCOUNTER — OFFICE VISIT (OUTPATIENT)
Dept: ORTHOPEDICS | Facility: CLINIC | Age: 60
End: 2025-05-28

## 2025-05-28 DIAGNOSIS — G56.02 CARPAL TUNNEL SYNDROME OF LEFT WRIST: Primary | ICD-10-CM

## 2025-05-28 PROCEDURE — 99024 POSTOP FOLLOW-UP VISIT: CPT | Performed by: ORTHOPAEDIC SURGERY

## 2025-05-28 NOTE — NURSING NOTE
Reason For Visit:   Chief Complaint   Patient presents with    Surgical Followup     Post op Release, Carpal Tunnel - Left. DOS: 5/21/25       Primary MD: Iron Reinoso  Ref. MD: Blaze    Age: 59 year old    ?  No      There were no vitals taken for this visit.      Pain Assessment  Patient Currently in Pain: Yes  Primary Pain Location: Wrist (Left)  Pain Descriptors: Sore, Intermittent    Hand Dominance Evaluation  Hand Dominance: Right          QuickDASH Assessment       No data to display                   Current Outpatient Medications   Medication Sig Dispense Refill    acetaminophen (TYLENOL) 325 MG tablet Take 2 tablets (650 mg) by mouth every 4 hours as needed for mild pain. 50 tablet 0    acetaminophen (TYLENOL) 500 MG tablet Take 1-2 tablets (500-1,000 mg) by mouth every 6 hours as needed for mild pain 90 tablet 3    brimonidine-timolol (COMBIGAN) 0.2-0.5 % ophthalmic solution       brinzolamide (AZOPT) 1 % ophthalmic suspension       dorzolamide (TRUSOPT) 2 % ophthalmic solution       EPINEPHrine (ANY BX GENERIC EQUIV) 0.3 MG/0.3ML injection 2-pack Inject 0.3 mLs (0.3 mg) into the muscle once as needed for anaphylaxis May repeat one time in 5-15 minutes if response to initial dose is inadequate. 2 each 0    glipiZIDE (GLUCOTROL XL) 5 MG 24 hr tablet Take 1 tablet (5 mg) by mouth daily. 90 tablet 2    ibuprofen (ADVIL/MOTRIN) 600 MG tablet Take 1 tablet (600 mg) by mouth every 6 hours as needed for other (mild and/or inflammatory pain). 30 tablet 0    ibuprofen (ADVIL/MOTRIN) 600 MG tablet Take 1 tablet (600 mg) by mouth every 6 hours as needed for moderate pain 90 tablet 3    metFORMIN (GLUCOPHAGE XR) 500 MG 24 hr tablet Take 2 tablets (1,000 mg) by mouth 2 times daily (with meals). 360 tablet 0    ondansetron (ZOFRAN ODT) 4 MG ODT tab Take 1 tablet (4 mg) by mouth every 8 hours as needed for nausea. 4 tablet 0    oxyCODONE (ROXICODONE) 5 MG tablet Take 1 tablet (5 mg) by mouth every 4 hours as  needed for moderate to severe pain. 5 tablet 0    prednisoLONE acetate (PRED FORTE) 1 % ophthalmic suspension       senna-docusate (SENOKOT-S/PERICOLACE) 8.6-50 MG tablet Take 1-2 tablets by mouth 2 times daily. 30 tablet 0       Allergies   Allergen Reactions    Chloroquine Itching    Invokana [Canagliflozin]     Lisinopril Angioedema     Likely ACEI angioedema    Pyrimethamine Blisters    Sodium Fluoride Other (See Comments)     Contact Dermatitis - Pt states that he is allergic to a Phosphate medication, he got blisters    Statins [Statins] Muscle Pain (Myalgia)     Both atorvastatin and pravastatin    Sulfa Antibiotics Itching    Latex Rash       Kerline Martell, ATC

## 2025-05-28 NOTE — LETTER
5/28/2025      Kurtis Senior  [address] [place] [address]      Dear Colleague,    Thank you for referring your patient, Kurtis Senior, to the [facility] [place]. Please see a copy of my visit note below.    Follow-up left carpal tunnel release.  He is doing well.  Still some numbness and tingling, mild pain no, no fevers or chills.    The past medical history was reviewed and documented in the chart.  This includes medications, surgeries, social history as well as review of systems.    Physical examination demonstrates a healing wound but not fully healed yet.  He does have sensation to light touch in the median nerve distribution APB is functioning.  Some tenderness noted around the incision but no signs of any infection, no drainage.    Impression: Status post left carpal tunnel release    Plan: We will leave the sutures in for today.  Light bandage was applied.  We talked about a home exercise program.  He can gradually increase use of the hand as tolerated now.  I will see him back in a week to remove the sutures.    Again, thank you for allowing me to participate in the care of your patient.        Sincerely,        Praful Bhagat MD    Electronically signed

## 2025-05-28 NOTE — PROGRESS NOTES
Follow-up left carpal tunnel release.  He is doing well.  Still some numbness and tingling, mild pain no, no fevers or chills.    The past medical history was reviewed and documented in the chart.  This includes medications, surgeries, social history as well as review of systems.    Physical examination demonstrates a healing wound but not fully healed yet.  He does have sensation to light touch in the median nerve distribution APB is functioning.  Some tenderness noted around the incision but no signs of any infection, no drainage.    Impression: Status post left carpal tunnel release    Plan: We will leave the sutures in for today.  Light bandage was applied.  We talked about a home exercise program.  He can gradually increase use of the hand as tolerated now.  I will see him back in a week to remove the sutures.

## 2025-05-30 DIAGNOSIS — E11.9 TYPE 2 DIABETES MELLITUS WITHOUT COMPLICATION, WITHOUT LONG-TERM CURRENT USE OF INSULIN (H): ICD-10-CM

## 2025-06-02 RX ORDER — GLIPIZIDE 5 MG/1
5 TABLET, FILM COATED, EXTENDED RELEASE ORAL DAILY
Qty: 90 TABLET | Refills: 2 | Status: SHIPPED | OUTPATIENT
Start: 2025-06-02

## 2025-06-02 RX ORDER — METFORMIN HYDROCHLORIDE 500 MG/1
1000 TABLET, EXTENDED RELEASE ORAL 2 TIMES DAILY WITH MEALS
Qty: 360 TABLET | Refills: 0 | Status: SHIPPED | OUTPATIENT
Start: 2025-06-02

## 2025-06-04 ENCOUNTER — OFFICE VISIT (OUTPATIENT)
Dept: ORTHOPEDICS | Facility: CLINIC | Age: 60
End: 2025-06-04
Payer: OTHER MISCELLANEOUS

## 2025-06-04 DIAGNOSIS — G56.02 CARPAL TUNNEL SYNDROME OF LEFT WRIST: Primary | ICD-10-CM

## 2025-06-04 DIAGNOSIS — R52 PAIN: ICD-10-CM

## 2025-06-04 PROCEDURE — 99024 POSTOP FOLLOW-UP VISIT: CPT | Performed by: ORTHOPAEDIC SURGERY

## 2025-06-04 RX ORDER — DICLOFENAC SODIUM 75 MG/1
75 TABLET, DELAYED RELEASE ORAL 2 TIMES DAILY
Qty: 30 TABLET | Refills: 0 | Status: SHIPPED | OUTPATIENT
Start: 2025-06-04

## 2025-06-04 NOTE — NURSING NOTE
Reason For Visit:   Chief Complaint   Patient presents with    Surgical Followup     Post op Release, Carpal Tunnel - Left. DOS: 5/21/25       Primary MD: Iron Reinoso  Ref. MD: Blaze    Age: 59 year old    ?  No      There were no vitals taken for this visit.      Pain Assessment  Patient Currently in Pain: Yes  Primary Pain Location: Finger (Comment which one) (Left thumb)  Pain Descriptors: Sharp, Intermittent    Hand Dominance Evaluation  Hand Dominance: Right          QuickDASH Assessment       No data to display                   Current Outpatient Medications   Medication Sig Dispense Refill    acetaminophen (TYLENOL) 325 MG tablet Take 2 tablets (650 mg) by mouth every 4 hours as needed for mild pain. 50 tablet 0    acetaminophen (TYLENOL) 500 MG tablet Take 1-2 tablets (500-1,000 mg) by mouth every 6 hours as needed for mild pain 90 tablet 3    brimonidine-timolol (COMBIGAN) 0.2-0.5 % ophthalmic solution       brinzolamide (AZOPT) 1 % ophthalmic suspension       dorzolamide (TRUSOPT) 2 % ophthalmic solution       EPINEPHrine (ANY BX GENERIC EQUIV) 0.3 MG/0.3ML injection 2-pack Inject 0.3 mLs (0.3 mg) into the muscle once as needed for anaphylaxis May repeat one time in 5-15 minutes if response to initial dose is inadequate. 2 each 0    glipiZIDE (GLUCOTROL XL) 5 MG 24 hr tablet TAKE 1 TABLET(5 MG) BY MOUTH DAILY 90 tablet 2    ibuprofen (ADVIL/MOTRIN) 600 MG tablet Take 1 tablet (600 mg) by mouth every 6 hours as needed for other (mild and/or inflammatory pain). 30 tablet 0    ibuprofen (ADVIL/MOTRIN) 600 MG tablet Take 1 tablet (600 mg) by mouth every 6 hours as needed for moderate pain 90 tablet 3    metFORMIN (GLUCOPHAGE XR) 500 MG 24 hr tablet TAKE 2 TABLETS(1000 MG) BY MOUTH TWICE DAILY WITH MEALS 360 tablet 0    ondansetron (ZOFRAN ODT) 4 MG ODT tab Take 1 tablet (4 mg) by mouth every 8 hours as needed for nausea. 4 tablet 0    oxyCODONE (ROXICODONE) 5 MG tablet Take 1 tablet (5 mg) by  mouth every 4 hours as needed for moderate to severe pain. 5 tablet 0    prednisoLONE acetate (PRED FORTE) 1 % ophthalmic suspension       senna-docusate (SENOKOT-S/PERICOLACE) 8.6-50 MG tablet Take 1-2 tablets by mouth 2 times daily. 30 tablet 0       Allergies   Allergen Reactions    Chloroquine Itching    Invokana [Canagliflozin]     Lisinopril Angioedema     Likely ACEI angioedema    Pyrimethamine Blisters    Sodium Fluoride Other (See Comments)     Contact Dermatitis - Pt states that he is allergic to a Phosphate medication, he got blisters    Statins [Statins] Muscle Pain (Myalgia)     Both atorvastatin and pravastatin    Sulfa Antibiotics Itching    Latex Rash       Kerline Martell, ATC

## 2025-06-04 NOTE — PROGRESS NOTES
Follow-up carpal tunnel release, doing well, no further numbness and tingling, incision is somewhat painful and he is having some pain going into the thenar eminence as well.    Examination left hand demonstrates a healing wound, no signs of infection.  Full sensation now.      Impression: Status post left carpal tunnel release      Plan: Everything looks good.  I did remove the sutures and Steri-Stripped his incision.  He can gradually increase activities as tolerated.  He was given a note to go back to light duty work.  He can wear splint as needed.  Follow-up in 2 to 3 weeks.

## 2025-06-04 NOTE — LETTER
[facility] [address] [address]  [phone]          June 4, 2025    RE:  Kurtis Senior                                                                                                                                                       [address] [place] [address]            To whom it may concern:    Kurtis Senior is under my professional care for left carpal tunnel release. He may return to work with the following restrictions: He may return to light duty work while lifting no more than 2-5 lbs with the left hand. He should not do any repetitive or prolonged grasping. He has a follow up appointment in approximately 3 weeks, at which time his working restrictions will be updated as needed.     Sincerely,        Praful Bhagat MD

## 2025-06-04 NOTE — LETTER
6/4/2025      Kurtis Senior  [address] [place] [address]      Dear Colleague,    Thank you for referring your patient, Kurtis Senior, to the [facility] [place]. Please see a copy of my visit note below.    Follow-up carpal tunnel release, doing well, no further numbness and tingling, incision is somewhat painful and he is having some pain going into the thenar eminence as well.    Examination left hand demonstrates a healing wound, no signs of infection.  Full sensation now.      Impression: Status post left carpal tunnel release      Plan: Everything looks good.  I did remove the sutures and Steri-Stripped his incision.  He can gradually increase activities as tolerated.  He was given a note to go back to light duty work.  He can wear splint as needed.  Follow-up in 2 to 3 weeks.          Again, thank you for allowing me to participate in the care of your patient.        Sincerely,        Praful Bhagat MD    Electronically signed

## 2025-06-25 ENCOUNTER — OFFICE VISIT (OUTPATIENT)
Dept: ORTHOPEDICS | Facility: CLINIC | Age: 60
End: 2025-06-25
Payer: OTHER MISCELLANEOUS

## 2025-06-25 DIAGNOSIS — G56.02 CARPAL TUNNEL SYNDROME OF LEFT WRIST: Primary | ICD-10-CM

## 2025-06-25 PROCEDURE — 99024 POSTOP FOLLOW-UP VISIT: CPT | Performed by: ORTHOPAEDIC SURGERY

## 2025-06-25 NOTE — LETTER
6/25/2025      Kurtis Senior  [address] [place] [address]      Dear Colleague,    Thank you for referring your patient, Kurtis Senior, to the [facility] [place]. Please see a copy of my visit note below.    ORTHOPEDIC SURGERY CLINIC NOTE      DATE OF SURGERY: 5/21/2025  SURGERY: Left carpal tunnel release    DATE OF VISIT: 06/25/25      HISTORY OF PRESENT ILLNESS:    Kurtis Senior is a 59 year old male approximately 4 weeks s/p the above mentioned surgery.  He is doing well overall.  He reports that he is making some progress.  He denies any significant concerns at this time..    PHYSICAL EXAMINATION:   Gen: A&O, NAD, answers questions appropriately  Resp: Breathing comfortably on room air  MSK:    Focused examination of the left upper extremity demonstrates healing incision with no signs of infection.  5 out of 5 strength of APB.  Sensation intact.        ASSESSMENT:  59 year old male 4 weeks s/p the above-stated procedure.  Patient is progressing appropriately.     PLAN:    - Continue activities as tolerated  - Return to clinic in 4 weeks    Tr Foss MD ortho PGY4         Attestation signed by Praful Bhagat MD at 6/25/2025  9:34 AM:  Patient was seen and examined with the resident.  I agree with the assessment and plan of care.      Again, thank you for allowing me to participate in the care of your patient.        Sincerely,        Praful Bhagat MD    Electronically signed

## 2025-06-25 NOTE — LETTER
[facility] [address] [address]  [phone]          June 25, 2025    RE:  Kurtis Senior                                                                                                                                                       [address] [place] [address]            To whom it may concern:    Kurtis Senior is under my professional care for left carpal tunnel release. He may return to work with the following restrictions: He may return to light duty work while lifting no more than 2-5 lbs with the left hand. He should not do any repetitive or prolonged grasping. He has a follow up appointment in approximately 4 weeks, at which time his working restrictions will be updated as needed.       Sincerely,        Praful Bhagat MD

## 2025-06-25 NOTE — NURSING NOTE
Reason For Visit:   Chief Complaint   Patient presents with    Surgical Followup     Follow-up Post op Release, Carpal Tunnel - Left. DOS: 5/21/25       Primary MD: Iron Reinoso  Ref. MD: Blaze    Age: 59 year old    ?  No      There were no vitals taken for this visit.      Pain Assessment  Patient Currently in Pain: Yes  Patient's Stated Pain Goal: 5  Primary Pain Location: Wrist (left)  Pain Descriptors: Intermittent, Sharp    Hand Dominance Evaluation  Hand Dominance: Right          QuickDASH Assessment       No data to display                   Current Outpatient Medications   Medication Sig Dispense Refill    acetaminophen (TYLENOL) 325 MG tablet Take 2 tablets (650 mg) by mouth every 4 hours as needed for mild pain. 50 tablet 0    acetaminophen (TYLENOL) 500 MG tablet Take 1-2 tablets (500-1,000 mg) by mouth every 6 hours as needed for mild pain 90 tablet 3    brimonidine-timolol (COMBIGAN) 0.2-0.5 % ophthalmic solution       brinzolamide (AZOPT) 1 % ophthalmic suspension       diclofenac (VOLTAREN) 75 MG EC tablet Take 1 tablet (75 mg) by mouth 2 times daily. 30 tablet 0    dorzolamide (TRUSOPT) 2 % ophthalmic solution       EPINEPHrine (ANY BX GENERIC EQUIV) 0.3 MG/0.3ML injection 2-pack Inject 0.3 mLs (0.3 mg) into the muscle once as needed for anaphylaxis May repeat one time in 5-15 minutes if response to initial dose is inadequate. 2 each 0    glipiZIDE (GLUCOTROL XL) 5 MG 24 hr tablet TAKE 1 TABLET(5 MG) BY MOUTH DAILY 90 tablet 2    ibuprofen (ADVIL/MOTRIN) 600 MG tablet Take 1 tablet (600 mg) by mouth every 6 hours as needed for other (mild and/or inflammatory pain). 30 tablet 0    ibuprofen (ADVIL/MOTRIN) 600 MG tablet Take 1 tablet (600 mg) by mouth every 6 hours as needed for moderate pain 90 tablet 3    metFORMIN (GLUCOPHAGE XR) 500 MG 24 hr tablet TAKE 2 TABLETS(1000 MG) BY MOUTH TWICE DAILY WITH MEALS 360 tablet 0    ondansetron (ZOFRAN ODT) 4 MG ODT tab Take 1 tablet (4 mg) by mouth  every 8 hours as needed for nausea. 4 tablet 0    oxyCODONE (ROXICODONE) 5 MG tablet Take 1 tablet (5 mg) by mouth every 4 hours as needed for moderate to severe pain. 5 tablet 0    prednisoLONE acetate (PRED FORTE) 1 % ophthalmic suspension       senna-docusate (SENOKOT-S/PERICOLACE) 8.6-50 MG tablet Take 1-2 tablets by mouth 2 times daily. 30 tablet 0       Allergies   Allergen Reactions    Chloroquine Itching    Invokana [Canagliflozin]     Lisinopril Angioedema     Likely ACEI angioedema    Pyrimethamine Blisters    Sodium Fluoride Other (See Comments)     Contact Dermatitis - Pt states that he is allergic to a Phosphate medication, he got blisters    Statins [Statins] Muscle Pain (Myalgia)     Both atorvastatin and pravastatin    Sulfa Antibiotics Itching    Latex Rash       MARY CRAMER, ATC

## 2025-06-25 NOTE — PROGRESS NOTES
ORTHOPEDIC SURGERY CLINIC NOTE      DATE OF SURGERY: 5/21/2025  SURGERY: Left carpal tunnel release    DATE OF VISIT: 06/25/25      HISTORY OF PRESENT ILLNESS:    Kurtis Senior is a 59 year old male approximately 4 weeks s/p the above mentioned surgery.  He is doing well overall.  He reports that he is making some progress.  He denies any significant concerns at this time..    PHYSICAL EXAMINATION:   Gen: A&O, NAD, answers questions appropriately  Resp: Breathing comfortably on room air  MSK:    Focused examination of the left upper extremity demonstrates healing incision with no signs of infection.  5 out of 5 strength of APB.  Sensation intact.        ASSESSMENT:  59 year old male 4 weeks s/p the above-stated procedure.  Patient is progressing appropriately.     PLAN:    - Continue activities as tolerated  - Return to clinic in 4 weeks    Tr Foss MD ortho PGY4

## 2025-07-16 ENCOUNTER — OFFICE VISIT (OUTPATIENT)
Dept: FAMILY MEDICINE | Facility: CLINIC | Age: 60
End: 2025-07-16
Payer: COMMERCIAL

## 2025-07-16 VITALS
OXYGEN SATURATION: 98 % | HEART RATE: 80 BPM | RESPIRATION RATE: 16 BRPM | DIASTOLIC BLOOD PRESSURE: 62 MMHG | BODY MASS INDEX: 27.16 KG/M2 | TEMPERATURE: 97 F | HEIGHT: 65 IN | WEIGHT: 163 LBS | SYSTOLIC BLOOD PRESSURE: 136 MMHG

## 2025-07-16 DIAGNOSIS — D69.6 THROMBOCYTOPENIA: ICD-10-CM

## 2025-07-16 DIAGNOSIS — R60.0 LOWER EXTREMITY EDEMA: ICD-10-CM

## 2025-07-16 DIAGNOSIS — E11.9 TYPE 2 DIABETES MELLITUS WITHOUT COMPLICATION, WITHOUT LONG-TERM CURRENT USE OF INSULIN (H): Primary | ICD-10-CM

## 2025-07-16 DIAGNOSIS — Z00.00 HEALTHCARE MAINTENANCE: ICD-10-CM

## 2025-07-16 LAB
ALBUMIN UR-MCNC: NEGATIVE MG/DL
APPEARANCE UR: CLEAR
BACTERIA #/AREA URNS HPF: ABNORMAL /HPF
BILIRUB UR QL STRIP: NEGATIVE
CAOX CRY #/AREA URNS HPF: ABNORMAL /HPF
COLOR UR AUTO: YELLOW
ERYTHROCYTE [DISTWIDTH] IN BLOOD BY AUTOMATED COUNT: 12.5 % (ref 10–15)
EST. AVERAGE GLUCOSE BLD GHB EST-MCNC: 169 MG/DL
GLUCOSE UR STRIP-MCNC: NEGATIVE MG/DL
HBA1C MFR BLD: 7.5 % (ref 0–5.6)
HCT VFR BLD AUTO: 40.3 % (ref 40–53)
HGB BLD-MCNC: 13.6 G/DL (ref 13.3–17.7)
HGB UR QL STRIP: NEGATIVE
KETONES UR STRIP-MCNC: NEGATIVE MG/DL
LEUKOCYTE ESTERASE UR QL STRIP: NEGATIVE
MCH RBC QN AUTO: 27.4 PG (ref 26.5–33)
MCHC RBC AUTO-ENTMCNC: 33.7 G/DL (ref 31.5–36.5)
MCV RBC AUTO: 81 FL (ref 78–100)
MUCOUS THREADS #/AREA URNS LPF: PRESENT /LPF
NITRATE UR QL: NEGATIVE
PH UR STRIP: 5.5 [PH] (ref 5–8)
PLATELET # BLD AUTO: 158 10E3/UL (ref 150–450)
RBC # BLD AUTO: 4.96 10E6/UL (ref 4.4–5.9)
RBC #/AREA URNS AUTO: ABNORMAL /HPF
SP GR UR STRIP: 1.02 (ref 1–1.03)
SQUAMOUS #/AREA URNS AUTO: ABNORMAL /LPF
UROBILINOGEN UR STRIP-ACNC: 0.2 E.U./DL
WBC # BLD AUTO: 5.5 10E3/UL (ref 4–11)
WBC #/AREA URNS AUTO: ABNORMAL /HPF

## 2025-07-16 PROCEDURE — 3051F HG A1C>EQUAL 7.0%<8.0%: CPT | Performed by: FAMILY MEDICINE

## 2025-07-16 PROCEDURE — 84460 ALANINE AMINO (ALT) (SGPT): CPT | Performed by: FAMILY MEDICINE

## 2025-07-16 PROCEDURE — 80048 BASIC METABOLIC PNL TOTAL CA: CPT | Performed by: FAMILY MEDICINE

## 2025-07-16 PROCEDURE — 3078F DIAST BP <80 MM HG: CPT | Performed by: FAMILY MEDICINE

## 2025-07-16 PROCEDURE — 3075F SYST BP GE 130 - 139MM HG: CPT | Performed by: FAMILY MEDICINE

## 2025-07-16 PROCEDURE — 85027 COMPLETE CBC AUTOMATED: CPT | Performed by: FAMILY MEDICINE

## 2025-07-16 PROCEDURE — 82570 ASSAY OF URINE CREATININE: CPT | Performed by: FAMILY MEDICINE

## 2025-07-16 PROCEDURE — 81001 URINALYSIS AUTO W/SCOPE: CPT | Performed by: FAMILY MEDICINE

## 2025-07-16 PROCEDURE — 80061 LIPID PANEL: CPT | Performed by: FAMILY MEDICINE

## 2025-07-16 PROCEDURE — 36415 COLL VENOUS BLD VENIPUNCTURE: CPT | Performed by: FAMILY MEDICINE

## 2025-07-16 PROCEDURE — 83880 ASSAY OF NATRIURETIC PEPTIDE: CPT | Performed by: FAMILY MEDICINE

## 2025-07-16 PROCEDURE — 82043 UR ALBUMIN QUANTITATIVE: CPT | Performed by: FAMILY MEDICINE

## 2025-07-16 PROCEDURE — 83036 HEMOGLOBIN GLYCOSYLATED A1C: CPT | Performed by: FAMILY MEDICINE

## 2025-07-16 PROCEDURE — 99214 OFFICE O/P EST MOD 30 MIN: CPT | Mod: 24 | Performed by: FAMILY MEDICINE

## 2025-07-16 NOTE — ASSESSMENT & PLAN NOTE
A1c is 7.5.  Discussed that this is not severely elevated but above optimal.  He has been very sedentary lately and understands that he needs to be more active (lost his job and it has got him down)  So no change, recheck A1c 3 months.  Intolerant of statin medications, lipid panel is pending.  Microalbumin ordered.  Sees ophthalmology regularly

## 2025-07-16 NOTE — PROGRESS NOTES
"  {PROVIDER CHARTING PREFERENCE:100403}    Subjective   Kurtis is a 59 year old, presenting for the following health issues:  Diabetes      7/16/2025     2:43 PM   Additional Questions   Roomed by Zahira WESTON   Accompanied by self     History of Present Illness       Diabetes:   He presents for follow up of diabetes.    He is not checking blood glucose.         He has no concerns regarding his diabetes at this time.  He is having blurry vision.            He eats 2-3 servings of fruits and vegetables daily.He consumes 1 sweetened beverage(s) daily.He exercises with enough effort to increase his heart rate 9 or less minutes per day.  He exercises with enough effort to increase his heart rate 3 or less days per week.   He is taking medications regularly.        {MA/LPN/RN Pre-Provider Visit Orders- hCG/UA/Strep (Optional):756017}  {SUPERLIST (Optional):410950}  {additonal problems for provider to add (Optional):943894}    {ROS Picklists (Optional):040247}      Objective    BP (!) 146/72   Pulse 80   Temp 97  F (36.1  C) (Temporal)   Resp 16   Ht 1.651 m (5' 5\")   Wt 73.9 kg (163 lb)   SpO2 98%   BMI 27.12 kg/m    Body mass index is 27.12 kg/m .  Physical Exam   {Exam List (Optional):542062}    {Diagnostic Test Results (Optional):684555}        Signed Electronically by: Iron Reinoso MD  {Email feedback regarding this note to [email or web address]   :448918}  "

## 2025-07-16 NOTE — PROGRESS NOTES
Assessment/ Plan  Type 2 diabetes mellitus (H)  A1c is 7.5.  Discussed that this is not severely elevated but above optimal.  He has been very sedentary lately and understands that he needs to be more active (lost his job and it has got him down)  So no change, recheck A1c 3 months.  Intolerant of statin medications, lipid panel is pending.  Microalbumin ordered.  Sees ophthalmology regularly    Thrombocytopenia  Borderline low, check CBC.    Lower extremity edema  When in active, sometimes swells, no symptoms of congestive heart failure, await microalbumin will add UA.  Patient indicates when he walks around, gets more active the swelling promptly goes away and is not particularly bothersome.  Will follow for now.  In the meantime also check BNP though patient does not have any PND, orthopnea or great increase in shortness of breath/BRIONES    Healthcare maintenance  Due for Shingrix.  Discussed.   Subjective  CC:  chief complaint  HPI:  Lower extremity edema, on and off for the last few months.  Present when sedentary.  Improves with activity.  Extensive discussion about symptoms of congestive heart failure.  No PND or orthopnea.  Normal shortness of breath when he exerts himself, nothing accelerated.  PFSH:  Patient Active Problem List   Diagnosis    Snoring    Type 2 diabetes mellitus (H)    Healthcare maintenance    Pain in joint, ankle and foot, right    Elevated blood pressure reading without diagnosis of hypertension    Microalbuminuria    Anemia, unspecified type    Benign paroxysmal positional vertigo    Bilateral low back pain without sciatica    Blunt eye trauma    Category 1 low vision of right eye with category 2 low vision of left eye    Constipation    Disorder of bursae and tendons in shoulder region    Dyslipidemia    Erectile dysfunction, unspecified erectile dysfunction type    Frequent PVCs    Gastritis and gastroduodenitis    Hearing loss    Heartburn    Lateral epicondylitis of left elbow     Mitral valve regurgitation    Thrombocytopenia    Tubular adenoma of colon    Urinary urgency    Pain    Benign prostatic hyperplasia with urinary frequency    Legal blindness    Monocular exotropia    Primary open-angle glaucoma, bilateral, severe stage    Dysuria    Angioedema, subsequent encounter    Night sweats    Bilateral carpal tunnel syndrome    Essential hypertension    Severe carpal tunnel syndrome of both wrists    Carpal tunnel syndrome of left wrist    Lower extremity edema     Current Outpatient Medications   Medication Sig Dispense Refill    acetaminophen (TYLENOL) 325 MG tablet Take 2 tablets (650 mg) by mouth every 4 hours as needed for mild pain. 50 tablet 0    acetaminophen (TYLENOL) 500 MG tablet Take 1-2 tablets (500-1,000 mg) by mouth every 6 hours as needed for mild pain 90 tablet 3    brimonidine-timolol (COMBIGAN) 0.2-0.5 % ophthalmic solution       brinzolamide (AZOPT) 1 % ophthalmic suspension       diclofenac (VOLTAREN) 75 MG EC tablet Take 1 tablet (75 mg) by mouth 2 times daily. 30 tablet 0    dorzolamide (TRUSOPT) 2 % ophthalmic solution       EPINEPHrine (ANY BX GENERIC EQUIV) 0.3 MG/0.3ML injection 2-pack Inject 0.3 mLs (0.3 mg) into the muscle once as needed for anaphylaxis May repeat one time in 5-15 minutes if response to initial dose is inadequate. 2 each 0    glipiZIDE (GLUCOTROL XL) 5 MG 24 hr tablet TAKE 1 TABLET(5 MG) BY MOUTH DAILY 90 tablet 2    ibuprofen (ADVIL/MOTRIN) 600 MG tablet Take 1 tablet (600 mg) by mouth every 6 hours as needed for other (mild and/or inflammatory pain). 30 tablet 0    ibuprofen (ADVIL/MOTRIN) 600 MG tablet Take 1 tablet (600 mg) by mouth every 6 hours as needed for moderate pain 90 tablet 3    metFORMIN (GLUCOPHAGE XR) 500 MG 24 hr tablet TAKE 2 TABLETS(1000 MG) BY MOUTH TWICE DAILY WITH MEALS 360 tablet 0    ondansetron (ZOFRAN ODT) 4 MG ODT tab Take 1 tablet (4 mg) by mouth every 8 hours as needed for nausea. 4 tablet 0    oxyCODONE  "(ROXICODONE) 5 MG tablet Take 1 tablet (5 mg) by mouth every 4 hours as needed for moderate to severe pain. 5 tablet 0    prednisoLONE acetate (PRED FORTE) 1 % ophthalmic suspension       senna-docusate (SENOKOT-S/PERICOLACE) 8.6-50 MG tablet Take 1-2 tablets by mouth 2 times daily. 30 tablet 0     No current facility-administered medications for this visit.        History   Smoking Status    Never   Smokeless Tobacco    Never     Social History     Social History Narrative    Not on file     Patient Care Team:  Iron Reinoso MD as PCP - General (Family Medicine)  Iron Reinoso MD as Assigned PCP  Praful Bhagat MD as Assigned Surgical Provider  Cinda Richey PA-C as Assigned Musculoskeletal Provider        Objective  Physical Exam  Vitals:    07/16/25 1444 07/16/25 1503   BP: (!) 146/72 136/62   Pulse: 80    Resp: 16    Temp: 97  F (36.1  C)    TempSrc: Temporal    SpO2: 98%    Weight: 73.9 kg (163 lb)    Height: 1.651 m (5' 5\")      Body mass index is 27.12 kg/m .  Gen- alert, oriented  No acute distress  HEENT- normal cephalic, atraumatic.   Chest- Normal inspiration and expiration.    Clear to ascultation.    No chest wall deformity or scar.    CV- Heart regular rate and rhythm  normal tones, no murmurs   No gallops or rubs.  Upper abdomen is not tender on palpation  Ext-no cyanosis   No edema  Skin- warm and dry,   no visualized rash    Diagnostics:   Results for orders placed or performed in visit on 07/16/25   Hemoglobin A1c     Status: Abnormal   Result Value Ref Range    Estimated Average Glucose 169 (H) <117 mg/dL    Hemoglobin A1C 7.5 (H) 0.0 - 5.6 %   CBC with platelets     Status: Normal   Result Value Ref Range    WBC Count 5.5 4.0 - 11.0 10e3/uL    RBC Count 4.96 4.40 - 5.90 10e6/uL    Hemoglobin 13.6 13.3 - 17.7 g/dL    Hematocrit 40.3 40.0 - 53.0 %    MCV 81 78 - 100 fL    MCH 27.4 26.5 - 33.0 pg    MCHC 33.7 31.5 - 36.5 g/dL    RDW 12.5 10.0 - 15.0 %    Platelet Count 158 150 " - 450 10e3/uL   UA with Microscopic reflex to Culture - Clinic Collect     Status: Normal    Specimen: Urine, Clean Catch   Result Value Ref Range    Color Urine Yellow Colorless, Straw, Light Yellow, Yellow    Appearance Urine Clear Clear    Glucose Urine Negative Negative mg/dL    Bilirubin Urine Negative Negative    Ketones Urine Negative Negative mg/dL    Specific Gravity Urine 1.025 1.005 - 1.030    Blood Urine Negative Negative    pH Urine 5.5 5.0 - 8.0    Protein Albumin Urine Negative Negative mg/dL    Urobilinogen Urine 0.2 0.2, 1.0 E.U./dL    Nitrite Urine Negative Negative    Leukocyte Esterase Urine Negative Negative   UA Microscopic with Reflex to Culture     Status: Abnormal   Result Value Ref Range    Bacteria Urine Few (A) None Seen /HPF    RBC Urine None Seen 0-2 /HPF /HPF    WBC Urine 0-5 0-5 /HPF /HPF    Squamous Epithelials Urine Few (A) None Seen /LPF    Mucus Urine Present (A) None Seen /LPF    Calcium Oxalate Crystals Urine Few (A) None Seen /HPF    Narrative    Urine Culture not indicated           Please note: Voice recognition software was used in this dictation.  It may therefore contain typographical errors.      Answers submitted by the patient for this visit:  Diabetes Visit (Submitted on 7/16/2025)  Chief Complaint: Chronic problems general questions HPI Form  Frequency of checking blood sugars:: not at all  Diabetic concerns:: none  Paraesthesia present:: blurry vision  General Questionnaire (Submitted on 7/16/2025)  Chief Complaint: Chronic problems general questions HPI Form  How many servings of fruits and vegetables do you eat daily?: 2-3  On average, how many sweetened beverages do you drink each day (Examples: soda, juice, sweet tea, etc.  Do NOT count diet or artificially sweetened beverages)?: 1  How many minutes a day do you exercise enough to make your heart beat faster?: 9 or less  How many days a week do you exercise enough to make your heart beat faster?: 3 or less  How  many days per week do you miss taking your medication?: 0  Questionnaire about: Chronic problems general questions HPI Form (Submitted on 7/16/2025)  Chief Complaint: Chronic problems general questions HPI Form

## 2025-07-16 NOTE — PATIENT INSTRUCTIONS
We recommend that all people 50 and older get vaccinated for shingles with Shingrix.  Shingrix vaccination requires 2 shots,;the first one followed by a second shot 2-6 months later.    We generally recommend that you get this shot at a pharmacy since they can check for insurance reimbursement..  Insurance should cover this before age 50, but there may be a significant co-pay.  Insurance coverage for people who are above 65 is less sure.  The vaccine costs more than $400, if you would have to pay full price.    Shingles is condition where people get a painful rash from an infection on a part of the body.  It comes from the chickenpox virus that is inside you from a childhood infection.  It can be very painful, and sometimes the pain stays after the infection goes away (especially in older people).  The vaccine provides life long protection.  We recommend it even if you have had shingles in the past.

## 2025-07-16 NOTE — ASSESSMENT & PLAN NOTE
When in active, sometimes swells, no symptoms of congestive heart failure, await microalbumin will add UA.  Patient indicates when he walks around, gets more active the swelling promptly goes away and is not particularly bothersome.  Will follow for now.  In the meantime also check BNP though patient does not have any PND, orthopnea or great increase in shortness of breath/BRIONES

## 2025-07-17 LAB
ALT SERPL W P-5'-P-CCNC: 24 U/L (ref 0–70)
ANION GAP SERPL CALCULATED.3IONS-SCNC: 11 MMOL/L (ref 7–15)
BUN SERPL-MCNC: 13.4 MG/DL (ref 6–20)
CALCIUM SERPL-MCNC: 9.7 MG/DL (ref 8.8–10.4)
CHLORIDE SERPL-SCNC: 105 MMOL/L (ref 98–107)
CHOLEST SERPL-MCNC: 157 MG/DL
CREAT SERPL-MCNC: 1.02 MG/DL (ref 0.67–1.17)
CREAT UR-MCNC: 237 MG/DL
EGFRCR SERPLBLD CKD-EPI 2021: 85 ML/MIN/1.73M2
FASTING STATUS PATIENT QL REPORTED: NO
FASTING STATUS PATIENT QL REPORTED: NO
GLUCOSE SERPL-MCNC: 109 MG/DL (ref 70–99)
HCO3 SERPL-SCNC: 25 MMOL/L (ref 22–29)
HDLC SERPL-MCNC: 27 MG/DL
LDLC SERPL CALC-MCNC: 79 MG/DL
MICROALBUMIN UR-MCNC: <12 MG/L
MICROALBUMIN/CREAT UR: NORMAL MG/G{CREAT}
NONHDLC SERPL-MCNC: 130 MG/DL
NT-PROBNP SERPL-MCNC: <36 PG/ML (ref 0–177)
POTASSIUM SERPL-SCNC: 4 MMOL/L (ref 3.4–5.3)
SODIUM SERPL-SCNC: 141 MMOL/L (ref 135–145)
TRIGL SERPL-MCNC: 257 MG/DL

## 2025-07-23 ENCOUNTER — OFFICE VISIT (OUTPATIENT)
Dept: ORTHOPEDICS | Facility: CLINIC | Age: 60
End: 2025-07-23
Payer: COMMERCIAL

## 2025-07-23 DIAGNOSIS — G56.03 BILATERAL CARPAL TUNNEL SYNDROME: Primary | ICD-10-CM

## 2025-07-23 PROCEDURE — 99024 POSTOP FOLLOW-UP VISIT: CPT | Performed by: ORTHOPAEDIC SURGERY

## 2025-07-23 NOTE — LETTER
7/23/2025      Kurtis Senior  [address] [place] [address]      Dear Colleague,    Thank you for referring your patient, Kurtis Senior, to the [facility] [place]. Please see a copy of my visit note below.    ORTHOPEDIC SURGERY CLINIC NOTE     DATE OF SURGERY: 5/21/2025  SURGERY: Left carpal tunnel release     DATE OF VISIT: 07/23/25      HISTORY OF PRESENT ILLNESS:    Kurtis Senior is a 59 year old male approximately 2 months s/p the above mentioned surgery.  He is doing well overall. No numbness or tingling.  Does not waking up at night.  Does have some persistent pain about the incision but overall doing well.  Hoping to return to work.    PHYSICAL EXAMINATION:   Gen: A&O, NAD, answers questions appropriately  Resp: Breathing comfortably on room air  MSK:  Focused examination of the left upper extremity demonstrates healing incision with no signs of infection.  No hypertrophic scar formation.  5 out of 5 strength of APB.  Sensation intact to light gliding touch about median, ulnar, radial nerve distribution.    ASSESSMENT:  59 year old male 2 months s/p the above-stated procedure.  Patient is progressing well..     PLAN:    - Continue activities as tolerated  - Work note provided to return to work without any restrictions  - Follow-up on an as-needed basis    Patient was seen and examined with Dr Bhagat.     Ashley Castillo MD   Orthopaedic surgery, PGY4     Attestation signed by Praful Bhagat MD at 7/23/2025  2:53 PM:  Patient was seen and examined with the resident.  I agree with the assessment and plan of care.      Again, thank you for allowing me to participate in the care of your patient.        Sincerely,        Praful Bhagat MD    Electronically signed

## 2025-07-23 NOTE — LETTER
[facility] [address] [address]  [phone]          July 23, 2025    RE:  Kurtis Senior                                                                                                                                                       [address] [place] [address]            To whom it may concern:    Kurtis Senior is under my professional care for Post op Left open carpal tunnel release.  He  may return to work with the following: The employee is ABLE to return to work at full duty no restrictions as of today.  Patient will follow up with me on an as needed basis.        Sincerely,        Praful Bhagat MD

## 2025-07-23 NOTE — NURSING NOTE
Reason For Visit:   Chief Complaint   Patient presents with    Surgical Followup     Post op Release, Carpal Tunnel - Left. DOS: 5/21/25       Primary MD: Iron Reinoso  Ref. MD: Blaze    Age: 59 year old    ?  No      There were no vitals taken for this visit.      Pain Assessment  Patient Currently in Pain: Yes  Primary Pain Location: Wrist (Left)  Pain Descriptors: Sore, Aching, Intermittent    Hand Dominance Evaluation  Hand Dominance: Right          QuickDASH Assessment       No data to display                   Current Outpatient Medications   Medication Sig Dispense Refill    acetaminophen (TYLENOL) 325 MG tablet Take 2 tablets (650 mg) by mouth every 4 hours as needed for mild pain. 50 tablet 0    acetaminophen (TYLENOL) 500 MG tablet Take 1-2 tablets (500-1,000 mg) by mouth every 6 hours as needed for mild pain 90 tablet 3    brimonidine-timolol (COMBIGAN) 0.2-0.5 % ophthalmic solution       brinzolamide (AZOPT) 1 % ophthalmic suspension       diclofenac (VOLTAREN) 75 MG EC tablet Take 1 tablet (75 mg) by mouth 2 times daily. 30 tablet 0    dorzolamide (TRUSOPT) 2 % ophthalmic solution       EPINEPHrine (ANY BX GENERIC EQUIV) 0.3 MG/0.3ML injection 2-pack Inject 0.3 mLs (0.3 mg) into the muscle once as needed for anaphylaxis May repeat one time in 5-15 minutes if response to initial dose is inadequate. 2 each 0    glipiZIDE (GLUCOTROL XL) 5 MG 24 hr tablet TAKE 1 TABLET(5 MG) BY MOUTH DAILY 90 tablet 2    ibuprofen (ADVIL/MOTRIN) 600 MG tablet Take 1 tablet (600 mg) by mouth every 6 hours as needed for other (mild and/or inflammatory pain). 30 tablet 0    ibuprofen (ADVIL/MOTRIN) 600 MG tablet Take 1 tablet (600 mg) by mouth every 6 hours as needed for moderate pain 90 tablet 3    metFORMIN (GLUCOPHAGE XR) 500 MG 24 hr tablet TAKE 2 TABLETS(1000 MG) BY MOUTH TWICE DAILY WITH MEALS 360 tablet 0    ondansetron (ZOFRAN ODT) 4 MG ODT tab Take 1 tablet (4 mg) by mouth every 8 hours as needed for  nausea. 4 tablet 0    oxyCODONE (ROXICODONE) 5 MG tablet Take 1 tablet (5 mg) by mouth every 4 hours as needed for moderate to severe pain. 5 tablet 0    prednisoLONE acetate (PRED FORTE) 1 % ophthalmic suspension       senna-docusate (SENOKOT-S/PERICOLACE) 8.6-50 MG tablet Take 1-2 tablets by mouth 2 times daily. 30 tablet 0       Allergies   Allergen Reactions    Chloroquine Itching    Invokana [Canagliflozin]     Lisinopril Angioedema     Likely ACEI angioedema    Pyrimethamine Blisters    Sodium Fluoride Other (See Comments)     Contact Dermatitis - Pt states that he is allergic to a Phosphate medication, he got blisters    Statins [Statins] Muscle Pain (Myalgia)     Both atorvastatin and pravastatin    Sulfa Antibiotics Itching    Latex Rash       Kerline Martell, ATC

## 2025-07-23 NOTE — PROGRESS NOTES
ORTHOPEDIC SURGERY CLINIC NOTE     DATE OF SURGERY: 5/21/2025  SURGERY: Left carpal tunnel release     DATE OF VISIT: 07/23/25      HISTORY OF PRESENT ILLNESS:    Kurtis Senior is a 59 year old male approximately 2 months s/p the above mentioned surgery.  He is doing well overall. No numbness or tingling.  Does not waking up at night.  Does have some persistent pain about the incision but overall doing well.  Hoping to return to work.    PHYSICAL EXAMINATION:   Gen: A&O, NAD, answers questions appropriately  Resp: Breathing comfortably on room air  MSK:  Focused examination of the left upper extremity demonstrates healing incision with no signs of infection.  No hypertrophic scar formation.  5 out of 5 strength of APB.  Sensation intact to light gliding touch about median, ulnar, radial nerve distribution.    ASSESSMENT:  59 year old male 2 months s/p the above-stated procedure.  Patient is progressing well..     PLAN:    - Continue activities as tolerated  - Work note provided to return to work without any restrictions  - Follow-up on an as-needed basis    Patient was seen and examined with Dr Bhagat.     Ashley Castillo MD   Orthopaedic surgery, PGY4

## 2025-08-01 DIAGNOSIS — E11.9 TYPE 2 DIABETES MELLITUS WITHOUT COMPLICATION, WITHOUT LONG-TERM CURRENT USE OF INSULIN (H): ICD-10-CM

## 2025-08-02 RX ORDER — METFORMIN HYDROCHLORIDE 500 MG/1
1000 TABLET, EXTENDED RELEASE ORAL 2 TIMES DAILY WITH MEALS
Qty: 360 TABLET | Refills: 0 | Status: SHIPPED | OUTPATIENT
Start: 2025-08-02

## (undated) DEVICE — BNDG KLING 2" 2231

## (undated) DEVICE — SU MONOCRYL 5-0 P-3 18" UND Y493G

## (undated) DEVICE — Device

## (undated) DEVICE — PACK MINOR HAND CUSTOM ASC 37-0097A

## (undated) DEVICE — PREP CHLORAPREP 26ML TINTED HI-LITE ORANGE 930815

## (undated) DEVICE — DRAPE STOCKINETTE 4" 8544

## (undated) DEVICE — SOL NACL 0.9% IRRIG 500ML BOTTLE 2F7123

## (undated) DEVICE — GLOVE BIOGEL PI SZ 8.0 40880

## (undated) DEVICE — LINEN ORTHO PACK 5446

## (undated) DEVICE — TOURNIQUET SGL BLADDER 18"X4" RED 5921-218-135

## (undated) RX ORDER — LIDOCAINE HYDROCHLORIDE AND EPINEPHRINE 10; 10 MG/ML; UG/ML
INJECTION, SOLUTION INFILTRATION; PERINEURAL
Status: DISPENSED
Start: 2024-10-16

## (undated) RX ORDER — LIDOCAINE HYDROCHLORIDE 10 MG/ML
INJECTION, SOLUTION EPIDURAL; INFILTRATION; INTRACAUDAL; PERINEURAL
Status: DISPENSED
Start: 2025-01-29

## (undated) RX ORDER — LIDOCAINE HYDROCHLORIDE AND EPINEPHRINE 10; 10 MG/ML; UG/ML
INJECTION, SOLUTION INFILTRATION; PERINEURAL
Status: DISPENSED
Start: 2025-05-21